# Patient Record
Sex: FEMALE | Race: WHITE | Employment: FULL TIME | URBAN - NONMETROPOLITAN AREA
[De-identification: names, ages, dates, MRNs, and addresses within clinical notes are randomized per-mention and may not be internally consistent; named-entity substitution may affect disease eponyms.]

---

## 2018-04-02 ENCOUNTER — HOSPITAL ENCOUNTER (EMERGENCY)
Age: 73
Discharge: HOME | End: 2018-04-02
Payer: MEDICARE

## 2018-04-02 VITALS
DIASTOLIC BLOOD PRESSURE: 68 MMHG | SYSTOLIC BLOOD PRESSURE: 123 MMHG | HEIGHT: 64 IN | HEART RATE: 97 BPM | WEIGHT: 117 LBS | TEMPERATURE: 98.4 F | OXYGEN SATURATION: 98 % | RESPIRATION RATE: 18 BRPM | BODY MASS INDEX: 19.97 KG/M2

## 2018-04-02 DIAGNOSIS — J01.00 ACUTE NON-RECURRENT MAXILLARY SINUSITIS: Primary | ICD-10-CM

## 2018-04-02 PROCEDURE — 99213 OFFICE O/P EST LOW 20 MIN: CPT | Performed by: NURSE PRACTITIONER

## 2018-04-02 PROCEDURE — 99213 OFFICE O/P EST LOW 20 MIN: CPT

## 2018-04-02 RX ORDER — DOXYCYCLINE HYCLATE 100 MG
100 TABLET ORAL 2 TIMES DAILY
Qty: 20 TABLET | Refills: 0 | Status: SHIPPED | OUTPATIENT
Start: 2018-04-02 | End: 2018-04-12

## 2018-04-02 ASSESSMENT — ENCOUNTER SYMPTOMS
SINUS PAIN: 1
SHORTNESS OF BREATH: 0
SINUS PRESSURE: 1
WHEEZING: 0
COUGH: 1
CONSTIPATION: 0
NAUSEA: 0
SORE THROAT: 0
DIARRHEA: 0
ABDOMINAL PAIN: 0

## 2018-04-04 ENCOUNTER — HOSPITAL ENCOUNTER (OUTPATIENT)
Dept: WOMENS IMAGING | Age: 73
Discharge: HOME | End: 2018-04-04
Payer: MEDICARE

## 2018-04-04 DIAGNOSIS — Z12.31 VISIT FOR SCREENING MAMMOGRAM: ICD-10-CM

## 2018-04-04 PROCEDURE — 3209999900 MAM COMPARISON OF OUTSIDE IMAGES

## 2018-04-04 PROCEDURE — 77063 BREAST TOMOSYNTHESIS BI: CPT

## 2018-10-12 ENCOUNTER — OFFICE VISIT (OUTPATIENT)
Dept: FAMILY MEDICINE CLINIC | Age: 73
End: 2018-10-12
Payer: MEDICARE

## 2018-10-12 VITALS
HEART RATE: 63 BPM | TEMPERATURE: 97.8 F | HEIGHT: 64 IN | RESPIRATION RATE: 12 BRPM | DIASTOLIC BLOOD PRESSURE: 64 MMHG | SYSTOLIC BLOOD PRESSURE: 138 MMHG | OXYGEN SATURATION: 98 % | BODY MASS INDEX: 20.28 KG/M2 | WEIGHT: 118.8 LBS

## 2018-10-12 DIAGNOSIS — Z85.3 HX: BREAST CANCER: ICD-10-CM

## 2018-10-12 DIAGNOSIS — Z11.59 ENCOUNTER FOR HEPATITIS C SCREENING TEST FOR LOW RISK PATIENT: ICD-10-CM

## 2018-10-12 DIAGNOSIS — Z23 NEED FOR PROPHYLACTIC VACCINATION AND INOCULATION AGAINST VARICELLA: ICD-10-CM

## 2018-10-12 DIAGNOSIS — Z78.0 POST-MENOPAUSAL: Primary | ICD-10-CM

## 2018-10-12 DIAGNOSIS — Z23 NEED FOR PROPHYLACTIC VACCINATION AGAINST DIPHTHERIA-TETANUS-PERTUSSIS (DTP): ICD-10-CM

## 2018-10-12 DIAGNOSIS — E78.2 MIXED HYPERLIPIDEMIA: ICD-10-CM

## 2018-10-12 DIAGNOSIS — Z13.29 THYROID DISORDER SCREEN: ICD-10-CM

## 2018-10-12 DIAGNOSIS — E55.9 VITAMIN D DEFICIENCY: ICD-10-CM

## 2018-10-12 PROCEDURE — 1036F TOBACCO NON-USER: CPT | Performed by: NURSE PRACTITIONER

## 2018-10-12 PROCEDURE — 3017F COLORECTAL CA SCREEN DOC REV: CPT | Performed by: NURSE PRACTITIONER

## 2018-10-12 PROCEDURE — 1123F ACP DISCUSS/DSCN MKR DOCD: CPT | Performed by: NURSE PRACTITIONER

## 2018-10-12 PROCEDURE — G8427 DOCREV CUR MEDS BY ELIG CLIN: HCPCS | Performed by: NURSE PRACTITIONER

## 2018-10-12 PROCEDURE — 1101F PT FALLS ASSESS-DOCD LE1/YR: CPT | Performed by: NURSE PRACTITIONER

## 2018-10-12 PROCEDURE — G8400 PT W/DXA NO RESULTS DOC: HCPCS | Performed by: NURSE PRACTITIONER

## 2018-10-12 PROCEDURE — G8420 CALC BMI NORM PARAMETERS: HCPCS | Performed by: NURSE PRACTITIONER

## 2018-10-12 PROCEDURE — G8482 FLU IMMUNIZE ORDER/ADMIN: HCPCS | Performed by: NURSE PRACTITIONER

## 2018-10-12 PROCEDURE — 99204 OFFICE O/P NEW MOD 45 MIN: CPT | Performed by: NURSE PRACTITIONER

## 2018-10-12 PROCEDURE — 4040F PNEUMOC VAC/ADMIN/RCVD: CPT | Performed by: NURSE PRACTITIONER

## 2018-10-12 PROCEDURE — 1090F PRES/ABSN URINE INCON ASSESS: CPT | Performed by: NURSE PRACTITIONER

## 2018-10-12 RX ORDER — VITAMIN B COMPLEX
1 CAPSULE ORAL DAILY
COMMUNITY
End: 2019-08-26

## 2018-10-12 RX ORDER — LANOLIN ALCOHOL/MO/W.PET/CERES
5 CREAM (GRAM) TOPICAL NIGHTLY PRN
COMMUNITY

## 2018-10-12 RX ORDER — DIMENHYDRINATE 50 MG
1000 TABLET ORAL 2 TIMES DAILY
COMMUNITY
End: 2019-08-26 | Stop reason: ALTCHOICE

## 2018-10-12 RX ORDER — ANASTROZOLE 1 MG/1
1 TABLET ORAL DAILY
COMMUNITY
End: 2019-05-07 | Stop reason: ALTCHOICE

## 2018-10-12 RX ORDER — OLIVE LEAF EXTRACT 250 MG
1 CAPSULE ORAL 2 TIMES DAILY
COMMUNITY

## 2018-10-12 RX ORDER — LANOLIN ALCOHOL/MO/W.PET/CERES
50 CREAM (GRAM) TOPICAL DAILY
COMMUNITY

## 2018-10-12 RX ORDER — ASCORBIC ACID 1000 MG
1 TABLET ORAL DAILY
COMMUNITY

## 2018-10-12 RX ORDER — VITAMIN E 268 MG
400 CAPSULE ORAL DAILY
COMMUNITY

## 2018-10-12 RX ORDER — AMOXICILLIN 500 MG
3 CAPSULE ORAL
COMMUNITY

## 2018-10-12 RX ORDER — LANOLIN ALCOHOL/MO/W.PET/CERES
1 CREAM (GRAM) TOPICAL 2 TIMES DAILY
COMMUNITY

## 2018-10-12 RX ORDER — ACETAMINOPHEN 160 MG
1 TABLET,DISINTEGRATING ORAL DAILY
COMMUNITY
End: 2019-05-21 | Stop reason: DRUGHIGH

## 2018-10-12 RX ORDER — FOLIC ACID 0.8 MG
1 TABLET ORAL 3 TIMES DAILY
COMMUNITY
End: 2019-11-26 | Stop reason: ALTCHOICE

## 2018-10-12 RX ORDER — AMPICILLIN TRIHYDRATE 250 MG
500 CAPSULE ORAL 3 TIMES DAILY
COMMUNITY

## 2018-10-12 RX ORDER — BIOTIN 10 MG
2 TABLET ORAL DAILY
COMMUNITY

## 2018-10-12 RX ORDER — BIOTIN 2500 MCG
1 CAPSULE ORAL EVERY OTHER DAY
COMMUNITY

## 2018-10-12 ASSESSMENT — ENCOUNTER SYMPTOMS
ANAL BLEEDING: 0
DIARRHEA: 0
ABDOMINAL DISTENTION: 0
SHORTNESS OF BREATH: 0
ABDOMINAL PAIN: 0
SORE THROAT: 0
EYE REDNESS: 0
CONSTIPATION: 0
EYE DISCHARGE: 0
RHINORRHEA: 0
COLOR CHANGE: 0
BLOOD IN STOOL: 0
NAUSEA: 0
COUGH: 0

## 2018-10-12 ASSESSMENT — PATIENT HEALTH QUESTIONNAIRE - PHQ9
1. LITTLE INTEREST OR PLEASURE IN DOING THINGS: 0
2. FEELING DOWN, DEPRESSED OR HOPELESS: 0
SUM OF ALL RESPONSES TO PHQ9 QUESTIONS 1 & 2: 0
SUM OF ALL RESPONSES TO PHQ QUESTIONS 1-9: 0
SUM OF ALL RESPONSES TO PHQ QUESTIONS 1-9: 0

## 2018-10-12 NOTE — PROGRESS NOTES
signs are normal. She appears well-developed and well-nourished. She does not appear ill. No distress. HENT:   Head: Normocephalic and atraumatic. Right Ear: Hearing, tympanic membrane, external ear and ear canal normal. No drainage, swelling or tenderness. No decreased hearing is noted. Left Ear: Hearing, tympanic membrane, external ear and ear canal normal. No drainage, swelling or tenderness. No decreased hearing is noted. Nose: Nose normal. No mucosal edema. Mouth/Throat: Uvula is midline, oropharynx is clear and moist and mucous membranes are normal.   Eyes: Pupils are equal, round, and reactive to light. Conjunctivae and EOM are normal. Right eye exhibits no discharge. Left eye exhibits no discharge. No scleral icterus. Neck: Normal range of motion. No thyromegaly present. Cardiovascular: Normal rate, regular rhythm and normal heart sounds. No murmur heard. Pulmonary/Chest: Effort normal and breath sounds normal. No accessory muscle usage. No respiratory distress. She has no decreased breath sounds. She has no wheezes. She has no rhonchi. She has no rales. Abdominal: Soft. Bowel sounds are normal. She exhibits no distension. There is no hepatosplenomegaly. There is no tenderness. There is no CVA tenderness. Musculoskeletal: She exhibits no edema. Lymphadenopathy:        Head (right side): No submental, no submandibular, no tonsillar, no preauricular and no posterior auricular adenopathy present. Head (left side): No submental, no submandibular, no tonsillar, no preauricular and no posterior auricular adenopathy present. She has no cervical adenopathy. Right: No supraclavicular adenopathy present. Left: No supraclavicular adenopathy present. Neurological: She is alert and oriented to person, place, and time. No cranial nerve deficit. Skin: Skin is warm and dry. No rash noted. She is not diaphoretic. No erythema.    Psychiatric: She has a normal mood and affect. Her behavior is normal. Judgment and thought content normal. Cognition and memory are normal.   Nursing note and vitals reviewed. Lab Results   Component Value Date    WBC 4.1 (L) 02/02/2015    HGB 12.6 02/02/2015    HCT 38.9 02/02/2015     02/02/2015    AST 21 04/08/2016     04/08/2016    K 4.4 04/08/2016    CL 99 04/08/2016    CREATININE 0.7 04/08/2016    BUN 24 (H) 04/08/2016    CO2 24 04/08/2016    TSH 2.450 02/02/2015    LABGLOM 83 (A) 04/08/2016    CALCIUM 10.0 04/08/2016       Assessment:       Diagnosis Orders   1. Post-menopausal  CBC Auto Differential    Hepatic Function Panel    Lipid Panel    Magnesium    T4    TSH with Reflex    Vitamin D 25 Hydroxy    Hepatitis C Antibody    Comprehensive Metabolic Panel   2. Mixed hyperlipidemia  CBC Auto Differential    Hepatic Function Panel    Lipid Panel    Magnesium    T4    TSH with Reflex    Vitamin D 25 Hydroxy    Hepatitis C Antibody    Comprehensive Metabolic Panel   3. HX: breast cancer  CBC Auto Differential    Hepatic Function Panel    Lipid Panel    Magnesium    T4    TSH with Reflex    Vitamin D 25 Hydroxy    Hepatitis C Antibody    Comprehensive Metabolic Panel   4. Need for prophylactic vaccination against diphtheria-tetanus-pertussis (DTP)  Tdap (ADACEL) 5-2-15.5 LF-MCG/0.5 injection   5. Need for prophylactic vaccination and inoculation against varicella  zoster recombinant adjuvanted vaccine (SHINGRIX) 50 MCG SUSR injection   6. Encounter for hepatitis C screening test for low risk patient  Hepatitis C Antibody   7. Vitamin D deficiency  CBC Auto Differential    Hepatic Function Panel    Lipid Panel    Magnesium    T4    TSH with Reflex    Vitamin D 25 Hydroxy    Hepatitis C Antibody    Comprehensive Metabolic Panel   8.  Thyroid disorder screen  CBC Auto Differential    Hepatic Function Panel    Lipid Panel    Magnesium    T4    TSH with Reflex    Vitamin D 25 Hydroxy    Hepatitis C Antibody    Comprehensive Metabolic

## 2018-11-01 LAB
ABSOLUTE BASO #: 0 K/UL (ref 0–0.1)
ABSOLUTE EOS #: 0.1 K/UL (ref 0.1–0.4)
ABSOLUTE LYMPH #: 1.5 K/UL (ref 0.8–5.2)
ABSOLUTE MONO #: 0.4 K/UL (ref 0.1–0.9)
ABSOLUTE NEUT #: 2.5 K/UL (ref 1.3–9.1)
ALBUMIN SERPL-MCNC: 4.5 G/DL (ref 3.5–5.2)
ALK PHOSPHATASE: 82 U/L (ref 30–146)
ALT SERPL-CCNC: 16 U/L (ref 5–40)
ANION GAP SERPL CALCULATED.3IONS-SCNC: 15 MEQ/L (ref 10–19)
AST SERPL-CCNC: 22 U/L (ref 9–40)
BASOPHILS RELATIVE PERCENT: 0.9 %
BILIRUB SERPL-MCNC: 0.7 MG/DL
BILIRUBIN DIRECT: 0.2 MG/DL
BUN BLDV-MCNC: 17 MG/DL (ref 8–23)
CALCIUM SERPL-MCNC: 9.1 MG/DL (ref 8.5–10.5)
CHLORIDE BLD-SCNC: 98 MEQ/L (ref 95–107)
CHOLESTEROL/HDL RATIO: 3.2
CHOLESTEROL: 311 MG/DL
CO2: 25 MEQ/L (ref 19–31)
CREAT SERPL-MCNC: 0.9 MG/DL (ref 0.6–1.3)
EGFR AFRICAN AMERICAN: 74 ML/MIN/1.73 M2
EGFR IF NONAFRICAN AMERICAN: 63.9 ML/MIN/1.73 M2
EOSINOPHILS RELATIVE PERCENT: 1.4 %
GLUCOSE: 95 MG/DL (ref 70–99)
HCT VFR BLD CALC: 41.1 % (ref 36–48)
HDLC SERPL-MCNC: 96.5 MG/DL
HEMOGLOBIN: 13.6 G/DL (ref 12–16)
HEPATITIS C ANTIBODY: NEGATIVE
LDL CHOLESTEROL CALCULATED: 200 MG/DL
LDL/HDL RATIO: 2.1
LYMPHOCYTE %: 32.7 %
MAGNESIUM: 2.2 MG/DL (ref 1.6–2.6)
MCH RBC QN AUTO: 29.5 PG (ref 27–34)
MCHC RBC AUTO-ENTMCNC: 33.1 G/DL (ref 31–36)
MCV RBC AUTO: 89.2 FL (ref 80–100)
MONOCYTES # BLD: 8.6 %
NEUTROPHILS RELATIVE PERCENT: 56.2 %
PDW BLD-RTO: 13.1 % (ref 10.8–14.8)
PLATELETS: 292 K/UL (ref 150–450)
POTASSIUM SERPL-SCNC: 4.5 MEQ/L (ref 3.5–5.4)
RBC: 4.61 M/UL (ref 4–5.5)
SODIUM BLD-SCNC: 138 MEQ/L (ref 135–146)
T4 TOTAL: 6.4 UG/DL (ref 4.5–12)
TOTAL PROTEIN: 6.8 G/DL (ref 6.1–8.3)
TRIGL SERPL-MCNC: 73 MG/DL
TSH SERPL DL<=0.05 MIU/L-ACNC: 1.99 UIU/ML (ref 0.4–4.1)
VLDLC SERPL CALC-MCNC: 15 MG/DL
WBC: 4.4 K/UL (ref 3.7–10.8)

## 2018-11-02 ENCOUNTER — TELEPHONE (OUTPATIENT)
Dept: FAMILY MEDICINE CLINIC | Age: 73
End: 2018-11-02

## 2018-11-02 LAB — VITAMIN D 25-HYDROXY: 45 NG/ML

## 2018-11-05 ENCOUNTER — OFFICE VISIT (OUTPATIENT)
Dept: FAMILY MEDICINE CLINIC | Age: 73
End: 2018-11-05
Payer: MEDICARE

## 2018-11-05 VITALS
DIASTOLIC BLOOD PRESSURE: 60 MMHG | OXYGEN SATURATION: 99 % | WEIGHT: 116.6 LBS | BODY MASS INDEX: 19.91 KG/M2 | HEIGHT: 64 IN | SYSTOLIC BLOOD PRESSURE: 126 MMHG | TEMPERATURE: 98.3 F | RESPIRATION RATE: 12 BRPM | HEART RATE: 78 BPM

## 2018-11-05 DIAGNOSIS — Z78.0 POST-MENOPAUSAL: ICD-10-CM

## 2018-11-05 DIAGNOSIS — Z23 NEED FOR PROPHYLACTIC VACCINATION AGAINST STREPTOCOCCUS PNEUMONIAE (PNEUMOCOCCUS): ICD-10-CM

## 2018-11-05 PROCEDURE — 4040F PNEUMOC VAC/ADMIN/RCVD: CPT | Performed by: FAMILY MEDICINE

## 2018-11-05 PROCEDURE — G8400 PT W/DXA NO RESULTS DOC: HCPCS | Performed by: FAMILY MEDICINE

## 2018-11-05 PROCEDURE — 1101F PT FALLS ASSESS-DOCD LE1/YR: CPT | Performed by: FAMILY MEDICINE

## 2018-11-05 PROCEDURE — 3017F COLORECTAL CA SCREEN DOC REV: CPT | Performed by: FAMILY MEDICINE

## 2018-11-05 PROCEDURE — 99214 OFFICE O/P EST MOD 30 MIN: CPT | Performed by: FAMILY MEDICINE

## 2018-11-05 PROCEDURE — 1090F PRES/ABSN URINE INCON ASSESS: CPT | Performed by: FAMILY MEDICINE

## 2018-11-05 PROCEDURE — 1123F ACP DISCUSS/DSCN MKR DOCD: CPT | Performed by: FAMILY MEDICINE

## 2018-11-05 PROCEDURE — G8427 DOCREV CUR MEDS BY ELIG CLIN: HCPCS | Performed by: FAMILY MEDICINE

## 2018-11-05 PROCEDURE — G8482 FLU IMMUNIZE ORDER/ADMIN: HCPCS | Performed by: FAMILY MEDICINE

## 2018-11-05 PROCEDURE — G8420 CALC BMI NORM PARAMETERS: HCPCS | Performed by: FAMILY MEDICINE

## 2018-11-05 PROCEDURE — G0009 ADMIN PNEUMOCOCCAL VACCINE: HCPCS | Performed by: FAMILY MEDICINE

## 2018-11-05 PROCEDURE — 1036F TOBACCO NON-USER: CPT | Performed by: FAMILY MEDICINE

## 2018-11-05 PROCEDURE — 90670 PCV13 VACCINE IM: CPT | Performed by: FAMILY MEDICINE

## 2018-11-05 ASSESSMENT — ENCOUNTER SYMPTOMS
SHORTNESS OF BREATH: 0
ABDOMINAL PAIN: 0
NAUSEA: 0
VOMITING: 0
BLOOD IN STOOL: 0
COUGH: 0
EYE PAIN: 0
TROUBLE SWALLOWING: 0
CONSTIPATION: 0
DIARRHEA: 0

## 2018-11-05 NOTE — PATIENT INSTRUCTIONS
example, DTaP), should not get PCV13. Anyone with a severe allergy to any component of PCV13 should not get the vaccine. Tell your doctor if the person being vaccinated has any severe allergies. If the person scheduled for vaccination is not feeling well, your healthcare provider might decide to reschedule the shot on another day. Risks of a vaccine reaction  With any medicine, including vaccines, there is a chance of reactions. These are usually mild and go away on their own, but serious reactions are also possible. Problems reported following PCV13 varied by age and dose in the series. The most common problems reported among children were:  · About half became drowsy after the shot, had a temporary loss of appetite, or had redness or tenderness where the shot was given. · About 1 out of 3 had swelling where the shot was given. · About 1 out of 3 had a mild fever, and about 1 in 20 had a fever over 102.2°F.  · Up to about 8 out of 10 became fussy or irritable. Adults have reported pain, redness, and swelling where the shot was given; also mild fever, fatigue, headache, chills, or muscle pain. Kiki Martinez children who get PCV13 along with inactivated flu vaccine at the same time may be at increased risk for seizures caused by fever. Ask your doctor for more information. Problems that could happen after any vaccine:  · People sometimes faint after a medical procedure, including vaccination. Sitting or lying down for about 15 minutes can help prevent fainting and the injuries caused by a fall. Tell your doctor if you feel dizzy or have vision changes or ringing in the ears. · Some older children and adults get severe pain in the shoulder and have difficulty moving the arm where a shot was given. This happens very rarely. · Any medication can cause a severe allergic reaction.  Such reactions from a vaccine are very rare, estimated at about 1 in a million doses, and would happen within a few minutes to a few hours

## 2018-11-05 NOTE — PROGRESS NOTES
Take 1 capsule by mouth daily      Garlic 791 MG CAPS Take 1 capsule by mouth 2 times daily      Calcium Carbonate-Vit D-Min (CALCIUM 1200 PO) Take 0.5 tablets by mouth 2 times daily      Omega-3 Fatty Acids (FISH OIL) 1200 MG CAPS Take 3,200 mg by mouth 2 times daily       vitamin E 400 UNIT capsule Take 400 Units by mouth daily      b complex vitamins capsule Take 1 capsule by mouth daily      Cyanocobalamin (VITAMIN B-12) 3000 MCG SUBL Place 2 tablets under the tongue daily       Flaxseed, Linseed, (FLAX SEED OIL) 1000 MG CAPS Take 1,000 mg by mouth 2 times daily      Biotin 2500 MCG CAPS Take 1 tablet by mouth daily      NONFORMULARY Take 1 capsule by mouth daily as needed Orega Resp      NONFORMULARY Take 1 capsule by mouth 2 times daily Respiration Blend SP-3      Olive Leaf Extract 250 MG CAPS Take 1 capsule by mouth 2 times daily      anastrozole (ARIMIDEX) 1 MG tablet Take 1 mg by mouth daily      UNKNOWN TO PATIENT       Cetirizine HCl (ZYRTEC ALLERGY) 10 MG CAPS Take  by mouth.  Loratadine (CLARITIN) 10 MG CAPS Take  by mouth.  zoster recombinant adjuvanted vaccine (SHINGRIX) 50 MCG SUSR injection 50 MCG IM then repeat 2-6 months. 0.5 mL 1    vitamin B-6 (PYRIDOXINE) 50 MG tablet Take 50 mg by mouth 2 times daily       No current facility-administered medications for this visit. Allergies   Allergen Reactions    Gluten Meal Other (See Comments)     Gluten,fruit, and sugar causes blisters of mouth    Orange Oil Other (See Comments)     Any acidic fruit    Pcn [Penicillins] Other (See Comments)     Unknown  Childhood reaction.        Health Maintenance   Topic Date Due    DTaP/Tdap/Td vaccine (1 - Tdap) 02/05/1965    Shingles Vaccine (1 of 2 - 2 Dose Series) 02/05/1996    Colon cancer screen colonoscopy  02/05/1996    DEXA (modify frequency per FRAX score)  02/05/2011    Pneumococcal low/med risk (1 of 2 - PCV13) 02/05/2011    Breast cancer screen  04/04/2019    Lipid

## 2018-11-07 ENCOUNTER — TELEPHONE (OUTPATIENT)
Dept: FAMILY MEDICINE CLINIC | Age: 73
End: 2018-11-07

## 2018-11-08 DIAGNOSIS — Z78.0 POST-MENOPAUSAL: ICD-10-CM

## 2019-02-26 ENCOUNTER — TELEPHONE (OUTPATIENT)
Dept: FAMILY MEDICINE CLINIC | Age: 74
End: 2019-02-26

## 2019-05-02 NOTE — PROGRESS NOTES
Health Maintenance Due   Topic Date Due    DTaP/Tdap/Td vaccine (1 - Tdap) pended    Shingles Vaccine (1 of 2) pended    Colon cancer screen colonoscopy  Has order    Breast cancer screen  pended

## 2019-05-07 ENCOUNTER — OFFICE VISIT (OUTPATIENT)
Dept: FAMILY MEDICINE CLINIC | Age: 74
End: 2019-05-07
Payer: MEDICARE

## 2019-05-07 VITALS
HEART RATE: 81 BPM | BODY MASS INDEX: 19.99 KG/M2 | SYSTOLIC BLOOD PRESSURE: 112 MMHG | DIASTOLIC BLOOD PRESSURE: 65 MMHG | WEIGHT: 120 LBS | OXYGEN SATURATION: 96 % | TEMPERATURE: 97.5 F | RESPIRATION RATE: 12 BRPM | HEIGHT: 65 IN

## 2019-05-07 DIAGNOSIS — E78.2 MIXED HYPERLIPIDEMIA: ICD-10-CM

## 2019-05-07 DIAGNOSIS — Z23 NEED FOR PROPHYLACTIC VACCINATION AGAINST DIPHTHERIA-TETANUS-PERTUSSIS (DTP): ICD-10-CM

## 2019-05-07 DIAGNOSIS — Z12.31 ENCOUNTER FOR SCREENING MAMMOGRAM FOR BREAST CANCER: ICD-10-CM

## 2019-05-07 DIAGNOSIS — Z23 NEED FOR PROPHYLACTIC VACCINATION AND INOCULATION AGAINST VARICELLA: ICD-10-CM

## 2019-05-07 DIAGNOSIS — R31.9 HEMATURIA, UNSPECIFIED TYPE: Primary | ICD-10-CM

## 2019-05-07 DIAGNOSIS — E55.9 VITAMIN D DEFICIENCY: ICD-10-CM

## 2019-05-07 LAB
BILIRUBIN URINE: NEGATIVE
BLOOD URINE, POC: ABNORMAL
CHARACTER, URINE: ABNORMAL
COLOR, URINE: YELLOW
GLUCOSE URINE: NEGATIVE MG/DL
KETONES, URINE: NEGATIVE
LEUKOCYTE CLUMPS, URINE: ABNORMAL
NITRITE, URINE: NEGATIVE
PH, URINE: 5.5 (ref 5–9)
PROTEIN, URINE: NEGATIVE MG/DL
SPECIFIC GRAVITY, URINE: 1.02 (ref 1–1.03)
UROBILINOGEN, URINE: 0.2 EU/DL (ref 0–1)

## 2019-05-07 PROCEDURE — G8420 CALC BMI NORM PARAMETERS: HCPCS | Performed by: NURSE PRACTITIONER

## 2019-05-07 PROCEDURE — G8400 PT W/DXA NO RESULTS DOC: HCPCS | Performed by: NURSE PRACTITIONER

## 2019-05-07 PROCEDURE — 3017F COLORECTAL CA SCREEN DOC REV: CPT | Performed by: NURSE PRACTITIONER

## 2019-05-07 PROCEDURE — 1123F ACP DISCUSS/DSCN MKR DOCD: CPT | Performed by: NURSE PRACTITIONER

## 2019-05-07 PROCEDURE — 1036F TOBACCO NON-USER: CPT | Performed by: NURSE PRACTITIONER

## 2019-05-07 PROCEDURE — 99214 OFFICE O/P EST MOD 30 MIN: CPT | Performed by: NURSE PRACTITIONER

## 2019-05-07 PROCEDURE — G8427 DOCREV CUR MEDS BY ELIG CLIN: HCPCS | Performed by: NURSE PRACTITIONER

## 2019-05-07 PROCEDURE — 4040F PNEUMOC VAC/ADMIN/RCVD: CPT | Performed by: NURSE PRACTITIONER

## 2019-05-07 PROCEDURE — 1090F PRES/ABSN URINE INCON ASSESS: CPT | Performed by: NURSE PRACTITIONER

## 2019-05-07 ASSESSMENT — ENCOUNTER SYMPTOMS
SORE THROAT: 0
EYE DISCHARGE: 0
COLOR CHANGE: 0
EYE REDNESS: 0
ABDOMINAL PAIN: 0
ABDOMINAL DISTENTION: 0
NAUSEA: 0
DIARRHEA: 0
SHORTNESS OF BREATH: 0
ANAL BLEEDING: 0
BLOOD IN STOOL: 0
COUGH: 0
RHINORRHEA: 0
CONSTIPATION: 0

## 2019-05-07 ASSESSMENT — PATIENT HEALTH QUESTIONNAIRE - PHQ9
2. FEELING DOWN, DEPRESSED OR HOPELESS: 0
SUM OF ALL RESPONSES TO PHQ QUESTIONS 1-9: 0
SUM OF ALL RESPONSES TO PHQ QUESTIONS 1-9: 0
1. LITTLE INTEREST OR PLEASURE IN DOING THINGS: 0
SUM OF ALL RESPONSES TO PHQ9 QUESTIONS 1 & 2: 0

## 2019-05-07 NOTE — PATIENT INSTRUCTIONS
Thank you   1. Thank you for trusting us with your healthcare needs. You may receive a survey regarding today's visit. It would help us out if you would take a few moments to provide your feedback. We value your input. 2. Please bring in ALL medications BOTTLES, including inhalers, herbal supplements, over the counter, prescribed & non-prescribed medicine. The office would like actual medication bottles and a list.   3. Please note our OFFICE POLICIES:   a. Prior to getting your labs drawn, please check with your insurance company for benefits and eligibility of lab services. Often, insurance companies cover certain tests for preventative visits only. It is patient's responsibility to see what is covered. b. We are unable to change a diagnosis after the test has been performed. c. Lab orders will not be re-printed. Please hold onto your original lab orders and take them to your lab to be completed. d. If you no show your scheduled appointment three times, you will be dismissed from this practice. e. Temo Wendy must be completed 24 hours prior to your schedule appointment. 4. If the list below has been completed, PLEASE FAX RECORDS TO OUR OFFICE @ [phone].  Once the records have been received we will update your records at our office:  Health Maintenance Due   Topic Date Due    DTaP/Tdap/Td vaccine (1 - Tdap) 02/05/1965    Shingles Vaccine (1 of 2) 02/05/1996    Colon cancer screen colonoscopy  02/05/1996    Breast cancer screen  04/04/2019       Will culture urine and if you need an Antibiotic we will send it in  Drink plenty of water - half of your bodyweight in ounces daily (100 pound person would drink 50 oz)  Avoid caffeine - tea, soda, chocolate  Do not hold your bladder  Wipe from front to back after unination  Urinate after intercourse  Will send the urine for culture    Will get some repeat blood work     Will have you come back in 2 weeks to give a urine sample to retest blood in the cage. This is called flank pain. ? You have a fever, chills, or body aches. ? It hurts to urinate. ? You have groin or belly pain.     · You have more blood in your urine.    Watch closely for changes in your health, and be sure to contact your doctor if:    · You have new urination problems.     · You do not get better as expected. Where can you learn more? Go to https://"[employer]peDestieb.Homeschooling Through the Ages. org and sign in to your [employer] account. Enter A564 in the [employer] box to learn more about \"Blood in the Urine: Care Instructions. \"     If you do not have an account, please click on the \"Sign Up Now\" link. Current as of: March 20, 2018  Content Version: 11.9  © 4572-2722 [employer]. Care instructions adapted under license by [facility] ([facility]). If you have questions about a medical condition or this instruction, always ask your healthcare professional. [place] [address] any warranty or liability for your use of this information. Patient Education        Painful Urination (Dysuria): Care Instructions  Your Care Instructions  Burning pain with urination (dysuria) is a common symptom of a urinary tract infection or other urinary problems. The bladder may become inflamed. This can cause pain when the bladder fills and empties. You may also feel pain if the tube that carries urine from the bladder to the outside of the body (urethra) gets irritated or infected. Sexually transmitted infections (STIs) also may cause pain when you urinate. Sometimes the pain can be caused by things other than an infection. The urethra can be irritated by soaps, perfumes, or foreign objects in the urethra. Kidney stones can cause pain when they pass through the urethra. The cause may be hard to find. You may need tests. Treatment for painful urination depends on the cause. Follow-up care is a key part of your treatment and safety.  Be sure to make and go to all appointments, and call your doctor if you are having problems. It's also a good idea to know your test results and keep a list of the medicines you take. How can you care for yourself at home? · Drink extra water for the next day or two. This will help make the urine less concentrated. (If you have kidney, heart, or liver disease and have to limit fluids, talk with your doctor before you increase the amount of fluids you drink.)  · Avoid drinks that are carbonated or have caffeine. They can irritate the bladder. · Urinate often. Try to empty your bladder each time. For women:  · Urinate right after you have sex. · After going to the bathroom, wipe from front to back. · Avoid douches, bubble baths, and feminine hygiene sprays. And avoid other feminine hygiene products that have deodorants. When should you call for help? Call your doctor now or seek immediate medical care if:    · You have new symptoms, such as fever, nausea, or vomiting.     · You have new or worse symptoms of a urinary problem. For example:  ? You have blood or pus in your urine. ? You have chills or body aches. ? It hurts worse to urinate. ? You have groin or belly pain. ? You have pain in your back just below your rib cage (the flank area).    Watch closely for changes in your health, and be sure to contact your doctor if you have any problems. Where can you learn more? Go to https://[employer]iAps.Roobiq. org and sign in to your [employer] account. Enter U130 in the Reputation.com box to learn more about \"Painful Urination (Dysuria): Care Instructions. \"     If you do not have an account, please click on the \"Sign Up Now\" link. Current as of: March 20, 2018  Content Version: 11.9  © 4848-8011 [employer]. Care instructions adapted under license by [facility] ([facility]).  If you have questions about a medical condition or this instruction, always ask your healthcare professional. Peyton Franklin disclaims any warranty or liability for your use of this information.

## 2019-05-07 NOTE — PROGRESS NOTES
[address] [facility]. [address] [place]  [address]  Dept: [phone]  Dept Fax: [phone]  Loc: [phone]    Visit Date: 5/7/2019    Jose Gimenez is a 69 y.o. female who presents today for:  Chief Complaint   Patient presents with    6 Month Follow-Up    Dysuria     HPI:     Noticed a change in her urine color - it was darker than usual - urine is normally clear. Denies pain or urgency, has not had fevers, n/v/d, abdominal pain, or any other concerns. She usually uses a special soap to cleanse her vaginal area but she was out so she used regular soap - noticed the change in urine color    Has a history of blood in her urine - was sent to a specialist and he told her she is \"prone\" to that - this was years ago. She thinks she had scopes in the past - when she was younger she would always get UTI - pus and blood in the urine - 30 years later is when she saw the specialist (unsure who it was). For her cholesterol she is taking Garlic, taking fish oil BID, eating oatmeal, and vitamin D.      Went to her cancer doctor at the end of the year - thinks she had mammogram the end of last year    Has papers for the colonoscopy - needs to call and schedule    HPI  Health Maintenance   Topic Date Due    DTaP/Tdap/Td vaccine (1 - Tdap) 02/05/1965    Shingles Vaccine (1 of 2) 02/05/1996    Colon cancer screen colonoscopy  02/05/1996    Breast cancer screen  04/04/2019    Pneumococcal 65+ years Vaccine (2 of 2 - PPSV23) 11/05/2019    Lipid screen  10/31/2023    DEXA (modify frequency per FRAX score)  Completed    Flu vaccine  Completed    Hepatitis C screen  Completed       Past Medical History:   Diagnosis Date    Cancer [facility])       Past Surgical History:   Procedure Laterality Date    BREAST SURGERY      FINGER TRIGGER RELEASE Right 2017     Family History   Problem Relation Age of Onset    Diabetes Mother     Cancer Mother colon    High Blood Pressure Mother     No Known Problems Father     Breast Cancer Sister     High Blood Pressure Brother     No Known Problems Brother     No Known Problems Brother      Social History     Tobacco Use    Smoking status: Never Smoker    Smokeless tobacco: Never Used   Substance Use Topics    Alcohol use: No      Current Outpatient Medications   Medication Sig Dispense Refill    Tdap (ADACEL) 5-2-15.5 LF-MCG/0.5 injection Inject 0.5 mLs into the muscle once for 1 dose 0.5 mL 0    zoster recombinant adjuvanted vaccine (SHINGRIX) 50 MCG/0.5ML SUSR injection Inject 0.5 mLs into the muscle once for 1 dose 50 MCG IM then repeat 2-6 months.  1 each 1    Cinnamon 500 MG CAPS Take 1,500 mg by mouth 2 times daily      Magnesium 500 MG CAPS Take 1 capsule by mouth daily      Ginger, Zingiber officinalis, (GINGER ROOT) 500 MG CAPS Take 1 capsule by mouth daily      Turmeric Curcumin 500 MG CAPS Take 1 capsule by mouth daily      melatonin 3 MG TABS tablet Take 5 mg by mouth nightly as needed      NONFORMULARY Take 1 tablet by mouth 2 times daily Cholestrol, artichoke, mga strength beta sitosterol      Cholecalciferol (VITAMIN D3) 2000 units CAPS Take 1 capsule by mouth daily      Garlic 087 MG CAPS Take 1 capsule by mouth 2 times daily      Calcium Carbonate-Vit D-Min (CALCIUM 1200 PO) Take 0.5 tablets by mouth 2 times daily      Omega-3 Fatty Acids (FISH OIL) 1200 MG CAPS Take 3,200 mg by mouth 2 times daily       vitamin E 400 UNIT capsule Take 400 Units by mouth daily      b complex vitamins capsule Take 1 capsule by mouth daily      vitamin B-6 (PYRIDOXINE) 50 MG tablet Take 50 mg by mouth 2 times daily      Cyanocobalamin (VITAMIN B-12) 3000 MCG SUBL Place 2 tablets under the tongue daily       Flaxseed, Linseed, (FLAX SEED OIL) 1000 MG CAPS Take 1,000 mg by mouth 2 times daily      Biotin 2500 MCG CAPS Take 1 tablet by mouth daily      NONFORMULARY Take 1 capsule by mouth well-nourished. She does not appear ill. No distress. HENT:   Head: Normocephalic and atraumatic. Right Ear: Hearing and external ear normal. No decreased hearing is noted. Left Ear: Hearing and external ear normal. No decreased hearing is noted. Nose: Nose normal. No nasal deformity. Eyes: Conjunctivae are normal. Right eye exhibits no discharge. Left eye exhibits no discharge. Neck: Normal range of motion. Neck supple. Pulmonary/Chest: Effort normal. No respiratory distress. Abdominal: She exhibits no distension. There is no guarding. Musculoskeletal: Normal range of motion. She exhibits no tenderness or deformity. Neurological: She is alert. Gait normal.   Skin: No rash (On exposed areas) noted. She is not diaphoretic. No erythema. No pallor. Psychiatric: She has a normal mood and affect. Her speech is normal and behavior is normal. Judgment and thought content normal. Cognition and memory are normal.       Lab Results   Component Value Date    WBC 4.4 10/31/2018    HGB 13.6 10/31/2018    HCT 41.1 10/31/2018     10/31/2018    CHOL 311 (H) 10/31/2018    TRIG 73 10/31/2018    HDL 96.5 10/31/2018    LDLCALC 200 (H) 10/31/2018    AST 22 10/31/2018     10/31/2018    K 4.5 10/31/2018    CL 98 10/31/2018    CREATININE 0.9 10/31/2018    BUN 17 10/31/2018    CO2 25 10/31/2018    TSH 1.99 10/31/2018    LABGLOM 83 (A) 04/08/2016    MG 2.2 10/31/2018    CALCIUM 9.1 10/31/2018    VITD25 45 10/31/2018       Assessment:       Diagnosis Orders   1. Hematuria, unspecified type  CBC Auto Differential    Hepatic Function Panel    Lipid Panel    Magnesium    T4    TSH with Reflex    Vitamin D 25 Hydroxy    Comprehensive Metabolic Panel    Urine Culture   2. Encounter for screening mammogram for breast cancer     3. Need for prophylactic vaccination against diphtheria-tetanus-pertussis (DTP)  Tdap (ADACEL) 5-2-15.5 LF-MCG/0.5 injection   4.  Need for prophylactic vaccination and inoculation against varicella  zoster recombinant adjuvanted vaccine [facility]) 50 MCG/0.5ML SUSR injection   5. Mixed hyperlipidemia  CBC Auto Differential    Hepatic Function Panel    Lipid Panel    Magnesium    T4    TSH with Reflex    Vitamin D 25 Hydroxy    Comprehensive Metabolic Panel   6. Vitamin D deficiency  Vitamin D 25 Hydroxy       Plan: Will culture urine and if you need an Antibiotic we will send it in  Drink plenty of water - half of your bodyweight in ounces daily (100 pound person would drink 50 oz)  Avoid caffeine - tea, soda, chocolate  Do not hold your bladder  Wipe from front to back after unination  Urinate after intercourse  Will send the urine for culture    Will get some repeat blood work     Will have you come back in 2 weeks to give a urine sample to retest blood in the urine - if still positive may refer to Urology. Return in about 6 months (around 11/7/2019), or if symptoms worsen or fail to improve, for 2 weeks for nurse visit for urine sample. Orders Placed:  Orders Placed This Encounter   Procedures    Urine Culture    CBC Auto Differential    Hepatic Function Panel    Lipid Panel    Magnesium    T4    TSH with Reflex    Vitamin D 25 Hydroxy    Comprehensive Metabolic Panel    POCT Urinalysis No Micro (Auto)     Medications Prescribed:  Orders Placed This Encounter   Medications    Tdap (ADACEL) 5-2-15.5 LF-MCG/0.5 injection     Sig: Inject 0.5 mLs into the muscle once for 1 dose     Dispense:  0.5 mL     Refill:  0    zoster recombinant adjuvanted vaccine (SHINGRIX) 50 MCG/0.5ML SUSR injection     Sig: Inject 0.5 mLs into the muscle once for 1 dose 50 MCG IM then repeat 2-6 months.      Dispense:  1 each     Refill:  1       Future Appointments   Date Time Provider Nuzhat Butcher   5/21/2019  8:00 AM SCHEDULE[facility] SRPX  [facility] [place]   11/5/2019  8:00 AM Louisa Padilla, APRN - CNP [facility] [place]        Patient given educational materials - see patient instructions. Discussed use, benefit, and side effects of prescribedmedications. All patient questions answered. Pt voiced understanding. Reviewed health maintenance. Instructed to continue current medications, diet and exercise. Patient agreed with treatment plan. Follow up as directed.     Electronically signed by MATTHEW Berger CNP on 5/7/2019 at 5:11 PM

## 2019-05-08 LAB — URINE CULTURE, ROUTINE: NORMAL

## 2019-05-09 ENCOUNTER — TELEPHONE (OUTPATIENT)
Dept: FAMILY MEDICINE CLINIC | Age: 74
End: 2019-05-09

## 2019-05-09 NOTE — TELEPHONE ENCOUNTER
----- Message from MATTHEW Saldana CNP sent at 5/8/2019  5:12 PM EDT -----  Urine shows mixed growth

## 2019-05-16 LAB
ABSOLUTE BASO #: 0.1 K/UL (ref 0–0.1)
ABSOLUTE EOS #: 0.1 K/UL (ref 0.1–0.4)
ABSOLUTE LYMPH #: 1.4 K/UL (ref 0.8–5.2)
ABSOLUTE MONO #: 0.5 K/UL (ref 0.1–0.9)
ABSOLUTE NEUT #: 2.8 K/UL (ref 1.3–9.1)
ALBUMIN SERPL-MCNC: 4.2 G/DL (ref 3.2–5.3)
ALK PHOSPHATASE: 71 U/L (ref 39–130)
ALT SERPL-CCNC: 15 U/L (ref 0–31)
ANION GAP SERPL CALCULATED.3IONS-SCNC: 12 MMOL/L (ref 4–12)
AST SERPL-CCNC: 21 U/L (ref 0–41)
BASOPHILS RELATIVE PERCENT: 1 %
BILIRUB SERPL-MCNC: 0.5 MG/DL (ref 0.3–1.2)
BILIRUBIN DIRECT: 0.1 MG/DL (ref 0–0.4)
BUN BLDV-MCNC: 18 MG/DL (ref 5–27)
CALCIUM SERPL-MCNC: 9.4 MG/DL (ref 8.5–10.5)
CHLORIDE BLD-SCNC: 101 MMOL/L (ref 98–109)
CHOLESTEROL/HDL RATIO: 3.2 (ref 1–5)
CHOLESTEROL: 279 MG/DL (ref 150–200)
CO2: 28 MMOL/L (ref 22–32)
CREAT SERPL-MCNC: 0.92 MG/DL (ref 0.4–1)
EGFR AFRICAN AMERICAN: >60 ML/MIN/1.73SQ.M
EGFR IF NONAFRICAN AMERICAN: 60 ML/MIN/1.73SQ.M
EOSINOPHILS RELATIVE PERCENT: 2.9 %
GLUCOSE: 76 MG/DL (ref 65–99)
HCT VFR BLD CALC: 40 % (ref 36–48)
HDLC SERPL-MCNC: 87 MG/DL
HEMOGLOBIN: 13.8 G/DL (ref 12–16)
LDL CHOLESTEROL CALCULATED: 176 MG/DL
LDL/HDL RATIO: 2
LYMPHOCYTE %: 29.2 %
MAGNESIUM: 2.2 MG/DL (ref 1.8–2.6)
MCH RBC QN AUTO: 31.2 PG (ref 27–34)
MCHC RBC AUTO-ENTMCNC: 34.5 G/DL (ref 31–36)
MCV RBC AUTO: 90.3 FL (ref 80–100)
MONOCYTES # BLD: 9.5 %
NEUTROPHILS RELATIVE PERCENT: 57.2 %
PDW BLD-RTO: 13.2 % (ref 10.8–14.8)
PLATELETS: 286 K/UL (ref 150–450)
POTASSIUM SERPL-SCNC: 4.3 MMOL/L (ref 3.5–5)
RBC: 4.43 M/UL (ref 4–5.5)
SODIUM BLD-SCNC: 141 MMOL/L (ref 134–146)
T4 TOTAL: 6.6 UG/DL (ref 6.1–12.2)
TOTAL PROTEIN: 6.6 G/DL (ref 6–8)
TRIGL SERPL-MCNC: 81 MG/DL (ref 27–150)
TSH SERPL DL<=0.05 MIU/L-ACNC: 1.32 UIU/ML (ref 0.49–4.67)
VLDLC SERPL CALC-MCNC: 16 MG/DL (ref 0–30)
WBC: 4.9 K/UL (ref 3.7–10.8)

## 2019-05-18 LAB — VITAMIN D 25-HYDROXY: 106 NG/ML

## 2019-05-20 ENCOUNTER — TELEPHONE (OUTPATIENT)
Dept: FAMILY MEDICINE CLINIC | Age: 74
End: 2019-05-20

## 2019-05-20 NOTE — TELEPHONE ENCOUNTER
CALLED Phone: [phone] (OTHER PHONE), SPOKE WITH RICKY, she is taking 5,000 UNITS, DAILY. YAEL STATED she can start taking vitamin d every other day. Patient states no future questions or concerns at this time. Patient states no future questions or concerns at this time.

## 2019-05-20 NOTE — TELEPHONE ENCOUNTER
----- Message from MATTHEW Kern CNP sent at 5/20/2019  2:14 PM EDT -----  How much vitamin D is she taking? Level is a bit high so we will need to change dose  Cholesterol is still high but better than 6 months ago! I know she does not want to start cholesterol medication so please have her continue to take her fish oil and be very strict with her diet.

## 2019-05-21 ENCOUNTER — NURSE ONLY (OUTPATIENT)
Dept: FAMILY MEDICINE CLINIC | Age: 74
End: 2019-05-21
Payer: MEDICARE

## 2019-05-21 VITALS
BODY MASS INDEX: 20.1 KG/M2 | DIASTOLIC BLOOD PRESSURE: 65 MMHG | WEIGHT: 119.2 LBS | OXYGEN SATURATION: 98 % | SYSTOLIC BLOOD PRESSURE: 119 MMHG | HEART RATE: 72 BPM | TEMPERATURE: 97.9 F

## 2019-05-21 DIAGNOSIS — E78.2 MIXED HYPERLIPIDEMIA: ICD-10-CM

## 2019-05-21 DIAGNOSIS — J06.9 VIRAL URI WITH COUGH: ICD-10-CM

## 2019-05-21 DIAGNOSIS — R31.9 HEMATURIA, UNSPECIFIED TYPE: ICD-10-CM

## 2019-05-21 DIAGNOSIS — Z12.31 ENCOUNTER FOR SCREENING MAMMOGRAM FOR BREAST CANCER: ICD-10-CM

## 2019-05-21 DIAGNOSIS — E55.9 VITAMIN D DEFICIENCY: ICD-10-CM

## 2019-05-21 DIAGNOSIS — R82.71 ASYMPTOMATIC BACTERIURIA: Primary | ICD-10-CM

## 2019-05-21 LAB
BILIRUBIN URINE: NEGATIVE
BLOOD URINE, POC: ABNORMAL
CHARACTER, URINE: CLEAR
COLOR, URINE: YELLOW
GLUCOSE URINE: NEGATIVE MG/DL
KETONES, URINE: NEGATIVE
LEUKOCYTE CLUMPS, URINE: ABNORMAL
NITRITE, URINE: NEGATIVE
PH, URINE: 7.5 (ref 5–9)
PROTEIN, URINE: NEGATIVE MG/DL
SPECIFIC GRAVITY, URINE: 1.01 (ref 1–1.03)
UROBILINOGEN, URINE: 0.2 EU/DL (ref 0–1)

## 2019-05-21 PROCEDURE — 99214 OFFICE O/P EST MOD 30 MIN: CPT | Performed by: NURSE PRACTITIONER

## 2019-05-21 ASSESSMENT — ENCOUNTER SYMPTOMS
RHINORRHEA: 1
CONSTIPATION: 0
VOMITING: 0
EYE DISCHARGE: 0
SORE THROAT: 0
EYE REDNESS: 0
SHORTNESS OF BREATH: 0
TROUBLE SWALLOWING: 0
BACK PAIN: 0
CHEST TIGHTNESS: 0
COUGH: 1
DIARRHEA: 0
WHEEZING: 0
ABDOMINAL PAIN: 0
SINUS PRESSURE: 0
NAUSEA: 0
SINUS PAIN: 0

## 2019-05-21 NOTE — PATIENT INSTRUCTIONS
Patient Education     Continue current medications. Continue current diet. Switch Vitamin D3 5,000 units to every other day. Increase water, 64 oz/day. Recheck labs in 3 months. Follow up as scheduled, sooner if needed. Cholesterol: Choosing a Heart-Healthy Life (01:57)  Your health professional recommends that you watch this short online health video. Learn about making healthy changes that can help lower your risk for heart attack and stroke. How to watch the video    Scan the QR code   OR Visit the website    [email or web address] [email or web address]   Current as of: July 22, 2018  Content Version: 12.0  © 1222-1098 [employer]orporated. Care instructions adapted under license by [facility] ([facility]). If you have questions about a medical condition or this instruction, always ask your healthcare professional. [address] any warranty or liability for your use of this information.

## 2019-05-21 NOTE — PROGRESS NOTES
[address] [facility] [address] [facility]  Dept: [phone]  Dept Fax: [phone]  Loc: [address] [facility]       Chief Complaint   Patient presents with    Discuss Labs    Urinary Tract Infection       Nurses Notes reviewed and I agree except as noted in the HPI. HISTORY OF PRESENT ILLNESS   Jose Gimenez is a 69 y.o. female who presents for f/u UTI and to discuss labs. Repeat UA - Small leukocytes/blood. No UTI s/s. Hyperlipidemia - Trying to focus on a low fat/gluten free diet. Avoiding sugar \"except for honey. \"  Eating 1/2 avacado, oatmeal, and sugar free dark chocolate piece every day. She is also taking Fish Oil as directed. Vitamin D deficiency - Taking 5,000 units daily. Recent level -, advised to take 5,000 units every other day. URI s/s onset 2 weeks ago with cough and congestion. No fever, wheezing, or SOB. Doing well with homeopathic medication. No addititional complaints. REVIEW OF SYSTEMS     Review of Systems   Constitutional: Negative for chills, diaphoresis, fatigue and fever. HENT: Positive for congestion, postnasal drip and rhinorrhea. Negative for ear pain, sinus pressure, sinus pain, sore throat and trouble swallowing. Eyes: Negative for discharge and redness. Respiratory: Positive for cough. Negative for chest tightness, shortness of breath and wheezing. Cardiovascular: Negative for chest pain. Gastrointestinal: Negative for abdominal pain, constipation, diarrhea, nausea and vomiting. Genitourinary: Negative for decreased urine volume, difficulty urinating, dysuria, flank pain, frequency, genital sores, hematuria, menstrual problem, pelvic pain, urgency, vaginal bleeding, vaginal discharge and vaginal pain. Musculoskeletal: Negative for back pain, neck pain and neck stiffness. Skin: Negative for rash.    Neurological: Negative for dizziness, light-headedness and headaches. Hematological: Negative for adenopathy. Psychiatric/Behavioral: Negative for sleep disturbance. PAST MEDICAL HISTORY         Diagnosis Date    Cancer [facility])        SURGICAL HISTORY     Patient  has a past surgical history that includes Breast surgery and Finger trigger release (Right, 2017).     CURRENT MEDICATIONS       Outpatient Medications Prior to Visit   Medication Sig Dispense Refill    vitamin D (CHOLECALCIFEROL) 5000 units CAPS capsule Take 5,000 Units by mouth every other day      Cinnamon 500 MG CAPS Take 1,500 mg by mouth 2 times daily      Magnesium 500 MG CAPS Take 1 capsule by mouth daily      Ginger, Zingiber officinalis, (GINGER ROOT) 500 MG CAPS Take 1 capsule by mouth daily      Turmeric Curcumin 500 MG CAPS Take 1 capsule by mouth daily      melatonin 3 MG TABS tablet Take 5 mg by mouth nightly as needed      NONFORMULARY Take 1 tablet by mouth 2 times daily Cholestrol, artichoke, mga strength beta sitosterol      Garlic 551 MG CAPS Take 1 capsule by mouth 2 times daily      Calcium Carbonate-Vit D-Min (CALCIUM 1200 PO) Take 0.5 tablets by mouth 2 times daily      Omega-3 Fatty Acids (FISH OIL) 1200 MG CAPS Take 3,200 mg by mouth 2 times daily       vitamin E 400 UNIT capsule Take 400 Units by mouth daily      b complex vitamins capsule Take 1 capsule by mouth daily      vitamin B-6 (PYRIDOXINE) 50 MG tablet Take 50 mg by mouth 2 times daily      Cyanocobalamin (VITAMIN B-12) 3000 MCG SUBL Place 2 tablets under the tongue daily       Flaxseed, Linseed, (FLAX SEED OIL) 1000 MG CAPS Take 1,000 mg by mouth 2 times daily      Biotin 2500 MCG CAPS Take 1 tablet by mouth daily      NONFORMULARY Take 1 capsule by mouth daily as needed [place] Resp      NONFORMULARY Take 1 capsule by mouth 2 times daily Respiration Blend SP-3      Olive Leaf Extract 250 MG CAPS Take 1 capsule by mouth 2 times daily      Cholecalciferol (VITAMIN D3) 2000 units CAPS Take 1 capsule by mouth daily      Cetirizine HCl (ZYRTEC ALLERGY) 10 MG CAPS Take  by mouth.  Loratadine (CLARITIN) 10 MG CAPS Take  by mouth. No facility-administered medications prior to visit. ALLERGIES     Patient is is allergic to gluten meal; orange oil; and pcn [penicillins]. FAMILY HISTORY     Patient's family history includes Breast Cancer in her sister; Cancer in her mother; Diabetes in her mother; High Blood Pressure in her brother and mother; No Known Problems in her brother, brother, and father. SOCIAL HISTORY     Patient  reports that she has never smoked. She has never used smokeless tobacco. She reports that she does not drink alcohol or use drugs. PHYSICAL EXAM     VITALS  BP: 119/65, Temp: 97.9 °F (36.6 °C), Pulse: 72,  , SpO2: 98 %  Physical Exam   Constitutional: She is oriented to person, place, and time. Vital signs are normal. She appears well-developed and well-nourished. She is cooperative. Non-toxic appearance. She does not have a sickly appearance. She does not appear ill. No distress. HENT:   Head: Normocephalic and atraumatic. Right Ear: Hearing, tympanic membrane, external ear and ear canal normal.   Left Ear: Hearing, tympanic membrane, external ear and ear canal normal.   Nose: Nose normal. No mucosal edema or rhinorrhea. Right sinus exhibits no maxillary sinus tenderness and no frontal sinus tenderness. Left sinus exhibits no maxillary sinus tenderness and no frontal sinus tenderness. Mouth/Throat: Uvula is midline, oropharynx is clear and moist and mucous membranes are normal. No trismus in the jaw. No uvula swelling. No oropharyngeal exudate, posterior oropharyngeal edema, posterior oropharyngeal erythema or tonsillar abscesses. Eyes: Pupils are equal, round, and reactive to light. Conjunctivae, EOM and lids are normal. Right conjunctiva is not injected. Right conjunctiva has no hemorrhage. Left conjunctiva is not injected.  Left conjunctiva has no hemorrhage. No scleral icterus. Neck: Trachea normal and normal range of motion. Neck supple. No thyromegaly present. Cardiovascular: Normal rate, regular rhythm and normal heart sounds. No extrasystoles are present. PMI is not displaced. Exam reveals no gallop and no friction rub. No murmur heard. Pulses:       Radial pulses are 2+ on the right side, and 2+ on the left side. Pulmonary/Chest: Effort normal and breath sounds normal. No respiratory distress. Abdominal: There is no CVA tenderness. Musculoskeletal:        Right lower leg: She exhibits no swelling and no edema. Left lower leg: She exhibits no swelling and no edema. Lymphadenopathy:        Head (right side): No submental, no submandibular, no tonsillar, no preauricular, no posterior auricular and no occipital adenopathy present. Head (left side): No submental, no submandibular, no tonsillar, no preauricular, no posterior auricular and no occipital adenopathy present. She has no cervical adenopathy. Right: No supraclavicular adenopathy present. Left: No supraclavicular adenopathy present. Neurological: She is alert and oriented to person, place, and time. She is not disoriented. No cranial nerve deficit or sensory deficit. Coordination and gait normal. GCS eye subscore is 4. GCS verbal subscore is 5. GCS motor subscore is 6. CN II-XII grossly intact   Skin: Skin is warm, dry and intact. Capillary refill takes less than 2 seconds. No rash noted. She is not diaphoretic. No pallor. Skin warm and dry to touch, no rashes noted on exposed surfaces. Psychiatric: She has a normal mood and affect. Her speech is normal and behavior is normal. Judgment and thought content normal. Cognition and memory are normal.   Nursing note and vitals reviewed.       DIAGNOSTIC RESULTS   Labs:  Results for orders placed or performed in visit on 05/21/19   POCT Urinalysis No Micro (Auto)   Result Value Ref Range    Glucose, Ur Negative NEGATIVE mg/dl    Bilirubin Urine Negative     Ketones, Urine Negative NEGATIVE    Specific Gravity, Urine 1.015 1.002 - 1.03    Blood, UA POC Small (A) NEGATIVE    pH, Urine 7.50 5.0 - 9.0    Protein, Urine Negative NEGATIVE mg/dl    Urobilinogen, Urine 0.20 0.0 - 1.0 eu/dl    Nitrite, Urine Negative NEGATIVE    Leukocyte Clumps, Urine Small (A) NEGATIVE    Color, Urine Yellow YELLOW-STR    Character, Urine Clear [id number]       IMAGING:  ADRIAN Digital Screen Bilateral [[id number]]    (Results Pending)       No images are attached to the encounter or orders placed in the encounter. CLINICAL COURSE COURSE:     Vitals:    05/21/19 0803   BP: 119/65   Site: Right Upper Arm   Position: Sitting   Cuff Size: Medium Adult   Pulse: 72   Temp: 97.9 °F (36.6 °C)   TempSrc: Oral   SpO2: 98%   Weight: 119 lb 3.2 oz (54.1 kg)         PROCEDURES:  None  FINAL IMPRESSION      1. Asymptomatic bacteriuria    2. Encounter for screening mammogram for breast cancer    3. Hematuria, unspecified type    4. Viral URI with cough    5. Vitamin D deficiency    6. Mixed hyperlipidemia         DISPOSITION/PLAN     Vitamin D Deficiency - Listed at 2,000 units on Profile. Patient was taking 5,000 units daily. She was advised by Josey Arreguin to take every other day. Recheck in 3 months. Mixed hyperlipidemia - Continue current diet and fish oil. Repeat labs ordered by Josey Arreguin. Viral URI w/ cough - No adventitious lung sounds. Continue current treatment. Hematuria/Asymptomatic bacteriuria - Notes PMH of similar symptoms. States she was told that she is \"just one of those people. \"  No UTI s/s. Repeat testing prn. PATIENT REFERRED TO:    Follow up with Josey Arreguin as scheduled, sooner if needed.     DISCHARGE MEDICATIONS:  New Prescriptions    No medications on file         Electronically signed by MATTHEW Correa CNP on 5/21/2019 at 9:02 AM

## 2019-05-21 NOTE — PROGRESS NOTES
Health Maintenance Due   Topic Date Due    DTaP/Tdap/Td vaccine (1 - Tdap) 02/05/1965  Expense     Shingles Vaccine (1 of 2) 02/05/1996  Expense     Colon cancer screen colonoscopy  02/05/1996  referred     Breast cancer screen  04/04/2019  Signed

## 2019-05-22 ENCOUNTER — TELEPHONE (OUTPATIENT)
Dept: FAMILY MEDICINE CLINIC | Age: 74
End: 2019-05-22

## 2019-05-22 LAB
ORGANISM: ABNORMAL
URINE CULTURE, ROUTINE: ABNORMAL

## 2019-05-22 NOTE — TELEPHONE ENCOUNTER
----- Message from MATTHEW Ackerman CNP sent at 5/22/2019 10:39 AM EDT -----  Urine culture with growth of contaminants. Follow up as needed for UTI symptoms.

## 2019-06-14 ENCOUNTER — TELEPHONE (OUTPATIENT)
Dept: FAMILY MEDICINE CLINIC | Age: 74
End: 2019-06-14

## 2019-06-18 ENCOUNTER — OFFICE VISIT (OUTPATIENT)
Dept: FAMILY MEDICINE CLINIC | Age: 74
End: 2019-06-18
Payer: MEDICARE

## 2019-06-18 VITALS
HEIGHT: 65 IN | TEMPERATURE: 98.3 F | WEIGHT: 120 LBS | BODY MASS INDEX: 19.99 KG/M2 | RESPIRATION RATE: 12 BRPM | DIASTOLIC BLOOD PRESSURE: 73 MMHG | OXYGEN SATURATION: 100 % | HEART RATE: 84 BPM | SYSTOLIC BLOOD PRESSURE: 131 MMHG

## 2019-06-18 DIAGNOSIS — L98.9 SKIN LESION: Primary | ICD-10-CM

## 2019-06-18 DIAGNOSIS — R53.83 OTHER FATIGUE: ICD-10-CM

## 2019-06-18 DIAGNOSIS — R23.2 HOT FLASHES: ICD-10-CM

## 2019-06-18 PROCEDURE — G8400 PT W/DXA NO RESULTS DOC: HCPCS | Performed by: NURSE PRACTITIONER

## 2019-06-18 PROCEDURE — 3017F COLORECTAL CA SCREEN DOC REV: CPT | Performed by: NURSE PRACTITIONER

## 2019-06-18 PROCEDURE — 1090F PRES/ABSN URINE INCON ASSESS: CPT | Performed by: NURSE PRACTITIONER

## 2019-06-18 PROCEDURE — 1123F ACP DISCUSS/DSCN MKR DOCD: CPT | Performed by: NURSE PRACTITIONER

## 2019-06-18 PROCEDURE — G8427 DOCREV CUR MEDS BY ELIG CLIN: HCPCS | Performed by: NURSE PRACTITIONER

## 2019-06-18 PROCEDURE — 99213 OFFICE O/P EST LOW 20 MIN: CPT | Performed by: NURSE PRACTITIONER

## 2019-06-18 PROCEDURE — 4040F PNEUMOC VAC/ADMIN/RCVD: CPT | Performed by: NURSE PRACTITIONER

## 2019-06-18 PROCEDURE — 1036F TOBACCO NON-USER: CPT | Performed by: NURSE PRACTITIONER

## 2019-06-18 PROCEDURE — G8420 CALC BMI NORM PARAMETERS: HCPCS | Performed by: NURSE PRACTITIONER

## 2019-06-18 ASSESSMENT — ENCOUNTER SYMPTOMS
CONSTIPATION: 0
SORE THROAT: 0
ABDOMINAL DISTENTION: 0
ABDOMINAL PAIN: 0
NAUSEA: 0
COLOR CHANGE: 0
SHORTNESS OF BREATH: 0
EYE DISCHARGE: 0
DIARRHEA: 0
ANAL BLEEDING: 0
RHINORRHEA: 0
BLOOD IN STOOL: 0
COUGH: 0
EYE REDNESS: 0

## 2019-06-18 NOTE — PROGRESS NOTES
[address] [facility]  [phone] (phone)  [phone] (fax)    Visit Date: 6/18/2019    Malgorzata Snowden is a 69 y.o. female who presents today for:  Chief Complaint   Patient presents with    Herpes Zoster     possible      HPI:     Had a red spot on her left upper arm x 2 weeks - c/o itching denies blistering or pain - has not spread - no known exposure or injury    Having hot flashes at night - was wondering about something for this that is not hormonal as she is being treated for hx breast cancer - Her Oncologist is Dr. Tegan Medina - has her on a \"cancer pill\" - Anastrozole 1 mg      HPI  Health Maintenance   Topic Date Due    DTaP/Tdap/Td vaccine (1 - Tdap) 02/05/1965    Shingles Vaccine (1 of 2) 02/05/1996    Colon cancer screen colonoscopy  02/05/1996    Annual Wellness Visit (AWV)  02/05/2009    Pneumococcal 65+ years Vaccine (2 of 2 - PPSV23) 11/05/2019    Breast cancer screen  06/03/2020    Lipid screen  05/16/2024    DEXA (modify frequency per FRAX score)  Completed    Flu vaccine  Completed    Hepatitis C screen  Completed       Past Medical History:   Diagnosis Date    Cancer [facility])       Past Surgical History:   Procedure Laterality Date    BREAST SURGERY      FINGER TRIGGER RELEASE Right 2017     Family History   Problem Relation Age of Onset    Diabetes Mother     Cancer Mother         colon    High Blood Pressure Mother     No Known Problems Father     Breast Cancer Sister     High Blood Pressure Brother     No Known Problems Brother     No Known Problems Brother      Social History     Tobacco Use    Smoking status: Never Smoker    Smokeless tobacco: Never Used   Substance Use Topics    Alcohol use: No      Current Outpatient Medications   Medication Sig Dispense Refill    triamcinolone (KENALOG) 0.1 % ointment Apply topically 2 times daily for 7 days 1 Tube 1    vitamin D (CHOLECALCIFEROL) 5000 units CAPS capsule Take 5,000 Units by mouth every other day congestion, ear pain, postnasal drip, rhinorrhea and sore throat. Eyes: Negative for discharge and redness. Respiratory: Negative for cough and shortness of breath. Cardiovascular: Negative for chest pain and leg swelling. Gastrointestinal: Negative for abdominal distention, abdominal pain, anal bleeding, blood in stool, constipation, diarrhea and nausea. Skin: Positive for rash (left upper arm). Negative for color change. Neurological: Negative for facial asymmetry, speech difficulty and weakness. Hematological: Does not bruise/bleed easily. Psychiatric/Behavioral: Negative for agitation and confusion. Objective:     Vitals:    06/18/19 1603   BP: 131/73   Site: Right Upper Arm   Position: Sitting   Cuff Size: Medium Adult   Pulse: 84   Resp: 12   Temp: 98.3 °F (36.8 °C)   TempSrc: Oral   SpO2: 100%   Weight: 120 lb (54.4 kg)   Height: 5' 4.57\" (1.64 m)       Body mass index is 20.24 kg/m². Wt Readings from Last 3 Encounters:   06/18/19 120 lb (54.4 kg)   05/21/19 119 lb 3.2 oz (54.1 kg)   05/07/19 120 lb (54.4 kg)     BP Readings from Last 3 Encounters:   06/18/19 131/73   05/21/19 119/65   05/07/19 112/65       Physical Exam   Constitutional: Vital signs are normal. She appears well-developed and well-nourished. She does not appear ill. No distress. HENT:   Head: Normocephalic and atraumatic. Right Ear: Hearing and external ear normal. No decreased hearing is noted. Left Ear: Hearing and external ear normal. No decreased hearing is noted. Nose: Nose normal. No nasal deformity. Eyes: Conjunctivae are normal. Right eye exhibits no discharge. Left eye exhibits no discharge. Neck: Normal range of motion. Neck supple. Pulmonary/Chest: Effort normal. No respiratory distress. Abdominal: She exhibits no distension. There is no guarding. Musculoskeletal: Normal range of motion. She exhibits no tenderness or deformity. Neurological: She is alert.  Gait normal.   Skin: Rash (left upper arm - red spot - see photo) noted. She is not diaphoretic. No erythema. No pallor. Psychiatric: She has a normal mood and affect. Her speech is normal and behavior is normal. Judgment and thought content normal. Cognition and memory are normal.         Lab Results   Component Value Date    WBC 4.9 05/16/2019    HGB 13.8 05/16/2019    HCT 40.0 05/16/2019     05/16/2019    CHOL 279 (H) 05/16/2019    TRIG 81 05/16/2019    HDL 87 05/16/2019    LDLCALC 176 (H) 05/16/2019    AST 21 05/16/2019     05/16/2019    K 4.3 05/16/2019     05/16/2019    CREATININE 0.92 05/16/2019    BUN 18 05/16/2019    CO2 28 05/16/2019    TSH 1.32 05/16/2019    LABGLOM 83 (A) 04/08/2016    MG 2.2 05/16/2019    CALCIUM 9.4 05/16/2019    VITD25 106 (H) 05/16/2019       Assessment:       Diagnosis Orders   1. Skin lesion  triamcinolone (KENALOG) 0.1 % ointment   2. Hot flashes  DHEA    DHEA-Sulfate   3. Other fatigue  Vitamin B12 & Folate       Plan: Will check the DHEA and B-12 levels  Will send in the ointment for the arm  Let us know if the area is not better or worse in about a week and we will biopsy    Return in about 3 months (around 9/18/2019), or if symptoms worsen or fail to improve. Orders Placed:  Orders Placed This Encounter   Procedures    DHEA    DHEA-Sulfate    Vitamin B12 & Folate     Medications Prescribed:  Orders Placed This Encounter   Medications    triamcinolone (KENALOG) 0.1 % ointment     Sig: Apply topically 2 times daily for 7 days     Dispense:  1 Tube     Refill:  1       Future Appointments   Date Time Provider Nuzhat Butcher   6/25/2019  2:20 PM [facility]   11/5/2019  8:00 AM MATTHEW Gomez - CNP [facility]O [facility] [address]        Patient given educational materials - see patient instructions. Discussed use, benefit, and side effects of prescribedmedications. All patient questions answered. Pt voiced understanding.  Reviewed health maintenance. Instructed to continue current medications, diet and exercise. Patient agreed with treatment plan. Follow up as directed.     Electronically signed by MATTHEW Lackey CNP on 6/18/2019 at 6:11 PM

## 2019-06-18 NOTE — PATIENT INSTRUCTIONS
Will check the DHEA and B-12 levels  Will send in the ointment for the arm  Let us know if the area is not better or worse in about a week and we will biopsy      Patient Education        Fatigue: Care Instructions  Your Care Instructions    Fatigue is a feeling of tiredness, exhaustion, or lack of energy. You may feel fatigue because of too much or not enough activity. It can also come from stress, lack of sleep, boredom, and poor diet. Many medical problems, such as viral infections, can cause fatigue. Emotional problems, especially depression, are often the cause of fatigue. Fatigue is most often a symptom of another problem. Treatment for fatigue depends on the cause. For example, if you have fatigue because you have a certain health problem, treating this problem also treats your fatigue. If depression or anxiety is the cause, treatment may help. Follow-up care is a key part of your treatment and safety. Be sure to make and go to all appointments, and call your doctor if you are having problems. It's also a good idea to know your test results and keep a list of the medicines you take. How can you care for yourself at home? · Get regular exercise. But don't overdo it. Go back and forth between rest and exercise. · Get plenty of rest.  · Eat a healthy diet. Do not skip meals, especially breakfast.  · Reduce your use of caffeine, tobacco, and alcohol. Caffeine is most often found in coffee, tea, cola drinks, and chocolate. · Limit medicines that can cause fatigue. This includes tranquilizers and cold and allergy medicines. When should you call for help? Watch closely for changes in your health, and be sure to contact your doctor if:    · You have new symptoms such as fever or a rash.     · Your fatigue gets worse.     · You have been feeling down, depressed, or hopeless. Or you may have lost interest in things that you usually enjoy.     · You are not getting better as expected.    Where can you learn more?  Go to [email or web address] org and sign in to your LUVHAN account. Enter N240 in the [facility] box to learn more about \"Fatigue: Care Instructions. \"     If you do not have an account, please click on the \"Sign Up Now\" link. Current as of: September 23, 2018  Content Version: 12.0  © 3783-3140 [employer]. Care instructions adapted under license by [facility] ([facility]). If you have questions about a medical condition or this instruction, always ask your healthcare professional. [place] [address] any warranty or liability for your use of this information. Patient Education        Learning About Menopause  What is menopause? For most women, menopause is a natural process of aging. Menstrual periods gradually stop. The ability to become pregnant ends. Some women feel relief that their childbearing years are ending. But other women struggle with the physical and emotional changes that come with menopause. For most women, menopause happens around age 48. But every woman's body has its own timeline. Some women stop having periods in their mid-40s. Others keep having periods well into their 50s. And some women go through menopause early because of cancer treatment or surgery to remove the ovaries. What can you expect with menopause? · It starts with perimenopause. This is the process of change that leads up to menopause. Perimenopause can start as early as your late 35s or as late as your early 46s. How long it lasts varies. But it usually lasts from 2 to 8 years. · During this time, your hormone levels will go up and down unevenly (fluctuate). This causes changes in your periods and other symptoms. In time, estrogen and progesterone levels drop enough that the menstrual cycle stops. Going a full year without having a period is usually considered menopause. · Low estrogen levels after menopause speed bone loss.  This increases your risk of osteoporosis. Also, your risk of heart disease increases after menopause. · It's normal to have thinner, dryer, wrinkled skin after menopause. The vaginal lining and the lower urinary tract also thin and weaken. This can make it hard to have sex. It can also increase the risk of vaginal and urinary tract infections. What are the symptoms? · Lighter or heavier periods. Your menstrual cycle may be longer or shorter. You may skip periods. · Hot flashes. You may have a sudden feeling of intense body heat. You may sweat, and your head, neck, and chest may get red. Along with hot flashes, you may have a heartbeat that's too fast or not regular. You may also feel anxious or grouchy. In rare cases you might feel panic. · Trouble sleeping. · Mood swings or feeling grouchy, depressed, or worried. · Problems with remembering or thinking clearly. · Vaginal dryness. Some women have only a few mild symptoms. Others have severe symptoms that disrupt their sleep and daily lives. Symptoms tend to last or get worse the first year or more after menopause. Over time, hormones even out at low levels. Many symptoms improve or go away. But some women may have symptoms that don't go away. How are menopause symptoms treated?   If you have mild symptoms, you may get some relief if you eat healthy foods, exercise, and lower your stress. Some women choose to take medicines if they have severe symptoms that aren't helped by making changes to their lifestyle.   Lifestyle changes    · Choose a heart-healthy diet that is low in saturated fat. It should include plenty of fish, fruits, vegetables, beans, and high-fiber grains and breads. Be sure you get enough calcium and vitamin D to help your bones stay strong. Low-fat or nonfat dairy products are a great source of calcium.     · Get regular exercise. Exercise can help you manage your weight, keep your heart and bones strong, and lift your mood.     · Limit caffeine, alcohol, and stress. These things may make symptoms worse. Limiting them may help you sleep better.     · If you smoke, stop. Quitting smoking can reduce hot flashes and long-term health risks. Medicines   If your symptoms are severe, talk with your doctor. You may want to try prescription medicines, such as:    · Low-dose birth control pills before menopause.     · Low-dose hormone therapy (HT) after menopause.     · Antidepressants.     · A medicine called clonidine (Catapres) that is usually used to treat high blood pressure.    All medicines for menopause symptoms have possible risks or side effects. A very small number of women develop serious health problems when taking hormone therapy. Be sure to talk to your doctor about your possible health risks before you start a treatment for menopause symptoms.   Other treatments   You can try:    · Breathing exercises. They may help reduce hot flashes and emotional symptoms.     · Soy. Some women feel that eating lots of soy helps even out their menopause symptoms.     · Yoga or biofeedback. They may help reduce stress. Follow-up care is a key part of your treatment and safety. Be sure to make and go to all appointments, and call your doctor if you are having problems. It's also a good idea to know your test results and keep a list of the medicines you take. Where can you learn more? Go to https://IntelliDOT.iSTAR. org and sign in to your TRUECar account. Enter H199 in the [facility] box to learn more about \"Learning About Menopause. \"     If you do not have an account, please click on the \"Sign Up Now\" link. Current as of: May 14, 2018  Content Version: 12.0  © 5189-0664 Health[employer], [employer]. Care instructions adapted under license by [facility] ([facility]). If you have questions about a medical condition or this instruction, always ask your healthcare professional. [address] any warranty or liability for your use of this information.

## 2019-06-21 LAB
FOLATE: 19.4 NG/ML
VITAMIN B-12: 950 PG/ML (ref 180–914)

## 2019-06-24 LAB — DHEAS (DHEA SULFATE): 30 UG/DL (ref 9–246)

## 2019-06-25 ENCOUNTER — OFFICE VISIT (OUTPATIENT)
Dept: FAMILY MEDICINE CLINIC | Age: 74
End: 2019-06-25
Payer: MEDICARE

## 2019-06-25 VITALS
OXYGEN SATURATION: 97 % | HEART RATE: 78 BPM | HEIGHT: 65 IN | DIASTOLIC BLOOD PRESSURE: 76 MMHG | TEMPERATURE: 98.6 F | SYSTOLIC BLOOD PRESSURE: 138 MMHG | WEIGHT: 120 LBS | RESPIRATION RATE: 10 BRPM | BODY MASS INDEX: 19.99 KG/M2

## 2019-06-25 DIAGNOSIS — R23.2 HOT FLASHES: ICD-10-CM

## 2019-06-25 DIAGNOSIS — Z12.31 ENCOUNTER FOR SCREENING MAMMOGRAM FOR BREAST CANCER: ICD-10-CM

## 2019-06-25 DIAGNOSIS — Z85.3 HX: BREAST CANCER: ICD-10-CM

## 2019-06-25 DIAGNOSIS — R53.83 OTHER FATIGUE: ICD-10-CM

## 2019-06-25 DIAGNOSIS — E78.2 MIXED HYPERLIPIDEMIA: ICD-10-CM

## 2019-06-25 DIAGNOSIS — Z13.29 THYROID DISORDER SCREEN: ICD-10-CM

## 2019-06-25 DIAGNOSIS — R31.9 HEMATURIA, UNSPECIFIED TYPE: ICD-10-CM

## 2019-06-25 DIAGNOSIS — E55.9 VITAMIN D DEFICIENCY: ICD-10-CM

## 2019-06-25 DIAGNOSIS — L98.9 SKIN LESION: Primary | ICD-10-CM

## 2019-06-25 PROCEDURE — G8420 CALC BMI NORM PARAMETERS: HCPCS | Performed by: FAMILY MEDICINE

## 2019-06-25 PROCEDURE — G8427 DOCREV CUR MEDS BY ELIG CLIN: HCPCS | Performed by: FAMILY MEDICINE

## 2019-06-25 PROCEDURE — 1036F TOBACCO NON-USER: CPT | Performed by: FAMILY MEDICINE

## 2019-06-25 PROCEDURE — 99214 OFFICE O/P EST MOD 30 MIN: CPT | Performed by: FAMILY MEDICINE

## 2019-06-25 PROCEDURE — 1123F ACP DISCUSS/DSCN MKR DOCD: CPT | Performed by: FAMILY MEDICINE

## 2019-06-25 PROCEDURE — G8400 PT W/DXA NO RESULTS DOC: HCPCS | Performed by: FAMILY MEDICINE

## 2019-06-25 PROCEDURE — 3017F COLORECTAL CA SCREEN DOC REV: CPT | Performed by: FAMILY MEDICINE

## 2019-06-25 PROCEDURE — 4040F PNEUMOC VAC/ADMIN/RCVD: CPT | Performed by: FAMILY MEDICINE

## 2019-06-25 PROCEDURE — 1090F PRES/ABSN URINE INCON ASSESS: CPT | Performed by: FAMILY MEDICINE

## 2019-06-25 ASSESSMENT — ENCOUNTER SYMPTOMS
GASTROINTESTINAL NEGATIVE: 1
SHORTNESS OF BREATH: 1
EYES NEGATIVE: 1

## 2019-06-25 NOTE — PATIENT INSTRUCTIONS
Thank you   1. Thank you for trusting us with your healthcare needs. You may receive a survey regarding today's visit. It would help us out if you would take a few moments to provide your feedback. We value your input. 2. Please bring in ALL medications BOTTLES, including inhalers, herbal supplements, over the counter, prescribed & non-prescribed medicine. The office would like actual medication bottles and a list.   3. Please note our OFFICE POLICIES:   a. Prior to getting your labs drawn, please check with your insurance company for benefits and eligibility of lab services. Often, insurance companies cover certain tests for preventative visits only. It is patient's responsibility to see what is covered. b. We are unable to change a diagnosis after the test has been performed. c. Lab orders will not be re-printed. Please hold onto your original lab orders and take them to your lab to be completed. d. If you no show your scheduled appointment three times, you will be dismissed from this practice. e. [place] must be completed 24 hours prior to your schedule appointment. 4. If the list below has been completed, PLEASE FAX RECORDS TO OUR OFFICE @ [phone].  Once the records have been received we will update your records at our office:  Health Maintenance Due   Topic Date Due    DTaP/Tdap/Td vaccine (1 - Tdap) 02[birth year]05/1965    Shingles Vaccine (1 of 2) 02/05/1996    Colon cancer screen colonoscopy  02/05/1996    Annual Wellness Visit (AWV)  02/05/2009

## 2019-06-25 NOTE — PROGRESS NOTES
[address] [facility]. [address] [facility] [address]  Dept: [phone]  Dept Fax: [phone]  Loc: [address] is a 69 y.o. White female. Marcia Guzmán  presents to the [facility] today for   Chief Complaint   Patient presents with   Verna Littlejohn Established New Doctor     ELENA PROTOCOL    Leg Pain     cramping, feet too for last month once in while    Other     hotflashes, about a year ago since off hormones with hx of breast cancer    Memory Loss     for a few seconds    Insomnia     hot flashes 3-4 times,    Back Pain    Joint Pain    Dental Problem     broken tooth to get cap    Hyperlipidemia    Headache     when eats something, gets blisters on lateral throat fornixs, uses zing    Skin Problem     thin hair from cancer   ,  and;   1. Skin lesion    2. Hot flashes    3. Other fatigue    4. Encounter for screening mammogram for breast cancer    5. Hematuria, unspecified type    6. Vitamin D deficiency    7. Mixed hyperlipidemia    8. HX: breast cancer    9. Thyroid disorder screen      I have reviewed [facility] medical, surgical and other pertinent history in detail, and have updated medication and allergy information in the computerized patientrecord. Clinical Care Team:     -Referring Provider for today's consult: Self Referred  -Primary Care Provider: MATTHEW Whaley - [facility]    Medical/Surgical History:   She  has a past medical history of Cancer ([facility] [address] Her  has a past surgical history that includes Breast surgery and Finger trigger release (Right, 2017). Family/Social History:     Her family history includes Breast Cancer in her sister; Cancer in her mother; Diabetes in her mother; High Blood Pressure in her brother and mother; No Known Problems in her brother, brother, and father. She  reports that she has never smoked.  She has never used smokeless tobacco. She reports that she does not drink alcohol or use drugs.     Medications/Allergies/Immunizations:     Her current medication(s) include   Current Outpatient Medications:     Potassium 99 MG TABS, Take 1 capsule by mouth daily, Disp: , Rfl:     diphenhydrAMINE-PE-APAP (ALLERGY RELIEF PLUS SINUS) 25-5-325 MG TABS, Take by mouth as needed, Disp: , Rfl:     triamcinolone (KENALOG) 0.1 % ointment, Apply topically 2 times daily for 7 days, Disp: 1 Tube, Rfl: 1    Cinnamon 500 MG CAPS, Take 1,500 mg by mouth 2 times daily, Disp: , Rfl:     Magnesium 500 MG CAPS, Take 1 capsule by mouth 2 times daily (before meals) [facility]S triple mag complex  When out, Disp: , Rfl:     Ginger, Zingiber officinalis, (GINGER ROOT) 500 MG CAPS, Take 1 capsule by mouth daily, Disp: , Rfl:     Turmeric Curcumin 500 MG CAPS, Take 1 capsule by mouth daily, Disp: , Rfl:     melatonin 3 MG TABS tablet, Take 5 mg by mouth nightly as needed, Disp: , Rfl:     Garlic 710 MG CAPS, Take 1 capsule by mouth 2 times daily, Disp: , Rfl:     Calcium Carbonate-Vit D-Min (CALCIUM 1200 PO), Take 1 tablet by mouth 3 times daily , Disp: , Rfl:     vitamin E 400 UNIT capsule, Take 400 Units by mouth daily, Disp: , Rfl:     b complex vitamins capsule, Take 1 capsule by mouth daily, Disp: , Rfl:     vitamin B-6 (PYRIDOXINE) 50 MG tablet, Take 50 mg by mouth 2 times daily, Disp: , Rfl:     Cyanocobalamin (VITAMIN B-12) 3000 MCG SUBL, Place 2 tablets under the tongue daily , Disp: , Rfl:     Flaxseed, Linseed, (FLAX SEED OIL) 1000 MG CAPS, Take 1,000 mg by mouth 2 times daily, Disp: , Rfl:     Biotin 2500 MCG CAPS, Take 1 tablet by mouth every other day , Disp: , Rfl:     NONFORMULARY, Take 1 capsule by mouth daily as needed Orega Resp, Disp: , Rfl:     NONFORMULARY, Take 1 capsule by mouth 2 times daily Respiration Blend SP-3, Disp: , Rfl:     Olive Leaf Extract 250 MG CAPS, Take 1 capsule by mouth 2 times daily, Disp: , Rfl:     Cetirizine HCl (ZYRTEC ALLERGY) 10 MG CAPS, Take by mouth nightly , Disp: , Rfl:     Loratadine (CLARITIN) 10 MG CAPS, Take  by mouth., Disp: , Rfl:     vitamin D (CHOLECALCIFEROL) 5000 units CAPS capsule, Take 5,000 Units by mouth every other day, Disp: , Rfl:     Omega-3 Fatty Acids (FISH OIL) 1200 MG CAPS, Take 1 capsule by mouth 4 times daily (before meals and nightly) CVS pharmaceutical grade when out, Disp: , Rfl:   Allergies: Gluten meal; Orange oil; and Pcn [penicillins],  Immunizations:   Immunization History   Administered Date(s) Administered    Influenza, High Dose (Fluzone 65 yrs and older) 12/07/2016, 10/11/2018    Pneumococcal Conjugate 13-valent [place]) 11/05/2018        History of PresentIllness:     Veronica had concerns including Established New Doctor (ELENA PROTOCOL); Leg Pain (cramping, feet too for last month once in while); Other (hotflashes, about a year ago since off hormones with hx of breast cancer); Memory Loss (for a few seconds); Insomnia (hot flashes 3-4 times,); Back Pain; Joint Pain; Dental Problem (broken tooth to get cap); Hyperlipidemia; Headache (when eats something, gets blisters on lateral throat fornixs, uses zing); and Skin Problem (thin hair from cancer). Annie Guillermo  presents to the [address] today for;   Chief Complaint   Patient presents with   Avel Reeves New Doctor     ELENA PROTOCOL    Leg Pain     cramping, feet too for last month once in while    Other     hotflashes, about a year ago since off hormones with hx of breast cancer    Memory Loss     for a few seconds    Insomnia     hot flashes 3-4 times,    Back Pain    Joint Pain    Dental Problem     broken tooth to get cap    Hyperlipidemia    Headache     when eats something, gets blisters on lateral throat fornixs, uses zing    Skin Problem     thin hair from cancer   , ,  abnormal labs follow up and these conditions as she  Is looking today for:     1. Skin lesion    2. Hot flashes    3. Other fatigue    4.  Encounter for 1.  Intensity of Service; Uncontrolled items at this visit; Chief Complaint   Patient presents with   Dorie Escalante Doctor     WEE PROTOCOL    Leg Pain     cramping, feet too for last month once in while    Other     hotflashes, about a year ago since off hormones with hx of breast cancer    Memory Loss     for a few seconds    Insomnia     hot flashes 3-4 times,    Back Pain    Joint Pain    Dental Problem     broken tooth to get cap    Hyperlipidemia    Headache     when eats something, gets blisters on lateral throat fornixs, uses zing    Skin Problem     thin hair from cancer   ;              Improved items at this visit; Stable items atthis visit;  2. Patients food and drinks were reviewed with the patient,       - Zelalem Phillips will bring food+drink symptom log to next visit for inclusion in their record      - 75 better food list reviewed & given topatient with the omega 6 food list to avoid         - Gluten in corn and oats abstracts sheet reviewed and given to the patient today   3. Greater than 45 minutes were spent face to face on this visit of which >50% was for counseling and coordination of care. Patients food and drinks were reviewed with thepatient,   - they will bring a food drink symptom log to future visits for inclusion in their record    - 75 better food list reviewed & given to patient along with the omega 6 food list to avoid      - Glutenin corn and oats abstracts sheet reviewed and given to the patient today    - 23 Foods containing Latex-like proteins was reviewed and copy to be taken if desired     - Nutrient Supplements list provided and copyto be taken if desired    - [email or web address] web site offered to patient to review at their convenience by staff with login information    Note:  I have discussed with the patient that with all nutraceuticals, there is often mixed data and emerging research which needs to be monitored; as well as an array of NIHfact sheets on nutrients and supplements. If I have recommended cinnamon at the request of this patient to assist them in control of their blood sugar, triglyceride and or weight issues. I discussed that thepatient's clinical use of cinnamon bark, calcium, magnesium, Vitamin D and pharmaceutical grade CVS #899886 fish oil or triple-strength fish oil, and B-75 two phase time-released B complex by Umu Oconnell will be for atime-limited trial to determine their individual effectiveness and safety in this patient. I also referred the patient to the NMCD: Nutrition, Metabolism, and Cardiovascular Diseases (journal) and concerns about long-termuse and hepatotoxicity of cinnamon and other nutrients and suggest they frequently search nih.gov for the latest non-proprietary information on nutriceuticals as well as consider a subscription to Yo que Vos fordetails on reviewed supplements, or at the least review the nutrient files at [address] at [facility], [employer], an insulin mimetic, reduces some High Carbohydrate Dietary Impacts. Methylhydroxychalcone polymers insulin-enhancing properties in fat cells are responsible for enhanced glucose uptake, inhibiting hepatic HMG-CoA reductase and lowers lipids. www.jacn. org/content/20/4/327.full     But cinnamon with additivessuch as Chromium or Cinnamon Extract are not effective as insulin mimetics.  https://www.harris.net/     Nutrients for Start up from [employer] or [employer] for ease to get started now ;  Dangelo Casillas has some useable products;  - Triple Strength Fish Oil, enteric coated  - Vit D 3 5000 IU gel caps  - Iron ferrous sulfat 325 mg tabs  - Centrum Silver look-a-like for most patients, or  - Centrum plain look-a-like if need iron    Localpharmacies or chains such as CVS, [facility], Wal-mart, have;  - Triple Strength Fish Oil (enteric coated ifavailable) or    If not enteric Usually turns bowel movements grey, green or black but not a concern  - Time released Niacin 250 mg Item #[id number] for cold intolerance, low libido or impotence  - DHEA 50 mg Item #[id number] for improving DHEA levels on labs if having Fatigue    If stools too loose substitute for your Magnesium oxide using;   Magnesium citrate 200 mg tabs(NOT liquid) at [employer]   Magnesium gluconate 550 mgby Zander at Remitly com or amazon. com  Magnesium chloride foot soaks or body sprays  www.Coradiant   Magnesium chloride flakes 14.99 Item #: [id number] if Backordered get spray    Food Drink Symptom Log;  I asked this patient to track these items and any other symptoms on their list on a weekly basis to documenttheir progress or lack of same. This can be done on the symptom tracking sheet I gave them at today's visit but looks like this:                                                      Rate on scale of 0-10 with zero = notnoticeable  Symptom:                            Week 1               2                 3                 4               Etc            Hair loss    Foot cramps    Paresthesia    Aches    IBS (irritable bowel)    Constipation    Diarrhea  Nocturia    (up to bathroom at night)    Fatigue/Energy level  Stress      On the other side of the sheet they can track their food, drink, environment, activity, symptoms etc      Avoiding Latex-like proteins inmy foods; Avocados, Bananas, Celery, Figs & Kiwi proteins have latex-like proteins to inflame our immunesystems  How Can I Have A Latex Allergy? Eating foods with latex-like protein exposes us to latex allergies. Our body cannot tell the differencebetween these latex-like proteins and latex from rubber products since many people are allergic to fruit, vegetables and latex. Read labels on pre-packaged foods.  This list to avoid is only a guide if you are known allergicto latex or have a latex rash on your chin, cheeks and lines on your neck and chest. The amount of latex is different in each food product or fruit variety. Foods to Avoid out of Season if not grown locally: Melon, Nectarine, Papaya, Cherry, Passion fruit, Plum, Chestnuts, and Tomato. Avocado, Banana, Celery, Figs, and Kiwi always contain Latex-like protein. Whats in Season? Strawberries taste better in June than December because June is strawberry season so buy locally grown produce \"in season\" for the best flavor, cost and less Latex. Locally grown produce notonly tastes great requires little of no ethylene exposure in food distribution so has less latex content. Out of season, use canned, frozen or dried sinceprocessed ripe and are latex lower!!!   Month     [place] LocallyGrown Produce  January, February, March: use canned, frozen or dried fruits since lower in latex  April; asparagus, radishes  May; asparagus, broccoli, green onions, greens, peas, radishes,rhubarb  June; asparagus, beets, beans, broccoli, cabbage, cantaloupe, carrots, green onions, greens, lettuce,onions, parsley, peas, radishes, rhubarb, strawberries, watermelons  July; beans, beets, blueberries,broccoli, cabbage, cantaloupe, carrots, cauliflower, celery, cucumbers, eggplant, grapes, green onions, greens, lettuce, onions, parsley, peas, peaches, bell peppers, potatoes, radishes, summer raspberries, squash, sweetcorn, tomatoes, turnips, watermelons  August; apples, beans, beets, blueberries, cabbage, cantaloupe, carrots,cauliflower, celery, cucumbers, eggplant, grapes, green onions, greens, lettuce, onions, parsley, peas, peaches, pears, bell peppers, potatoes, radishes, squash, sweet corn, tomatoes, turnips, watermelons  September; apples, beans, beets, blueberries, cabbage, cantaloupe, carrots, cauliflower, celery, cucumbers, eggplant, grapes,green onions, greens, lettuce, onions, parsley, peas, peaches, pears, bell peppers, plums, potatoes, pumpkins, radishes, fall red raspberries, squash, sweet corn, tomatoes, turnips, watermelons  October; apples, beets, broccoli, cabbage, carrots, cauliflower, celery, green onions, greens, lettuce, parsley, peas, pears, potatoes,pumpkins, radishes, fall red raspberries, squash, turnips  November; broccoli, cabbage, carrots, parsley,pears, peas  December: use canned, frozen ordried fruits since lower in latex    Upto half of latex-sensitive patients show allergic reactions to fruits (avocados, bananas, kiwifruits, papayas, peaches),   Annals of Allergy, 1994. These plants contain the same proteins that are allergens in latex. People with fruit allergies should warn physicians beforeundergoing procedures which may cause anaphylactic reaction if in contact with latex gloves. Some of the common foods with defined cross-reactivity to latexare avocado, banana, kiwi, chestnut, raw potato, tomato,stone fruits (e.g., peach, cherry), hazelnut, melons, celery, carrot, apple, pear, papaya, and almond. Foods with less well-defined cross-reactivity to latex are peanuts, peppers, citrus fruits, coconut, pineapple, dany,fig, passion fruit, Ugli fruit, and grape    This fruit/latex cross-reactivity is worsened by ethylene, a gas used to hasten commercial ripening. In nature, plants produce low levels of the hormone ethylene, which regulates germination, flowering, and ripening. Forced ripening by high ethyleneconcentrations, plants produce allergenic wound-repair proteins, which are similar to wound-repair proteins made during the tapping of rubber trees. Sensitive individualswho ingest the fruit get a higher dose and worse reaction. Some people may even first become sensitized to latex through fruit. Can food processing increase theconcentrations of allergenic proteins? Latex-sensitized children (and adults) in [place] often experience allergic reactions after eating bananas ripenedartificially with ethylene.  In the [place], food distribution centers treat unripe bananas and other produce with ethylene to ripen; not commonly done in [facility] since fruit is tree-ripened there. Does treatmentof food with ethylene induce banana proteins that cross-react with latex? (Sarai et al.    References:   Latex in Foods Allergy, http://ehp.niehs.nih.gov/members/2003/5811/5811.html    Search web for \" Whats in Season \" for whereyou live or are at the time you food shop  www.nutritioncouncil.org/pdf/healthy/SeasonalProduce. pdf ,   Management of Latex, http://medicalcenter. osu.edu/  search for latex

## 2019-06-26 LAB
ANTI-NUCLEAR ANTIBODY (ANA): NORMAL
DEHYDROEPIANDROSTERONE: 0.6 NG/ML (ref 1.3–9.8)
ESTRADIOL LEVEL: <15 PG/ML
FOLLICLE STIMULATING HORMONE: 79.5 MIU/ML
HIGH SENSITIVE C-REACTIVE PROTEIN: 0.09 MG/DL (ref 0–0.74)
LH: 30.7 MIU/ML
PROGESTERONE LEVEL: 0.4 NG/ML
RHEUMATOID FACTOR: <10 IU/ML
SEDIMENTATION RATE, ERYTHROCYTE: 17 MM/HR (ref 0–20)

## 2019-06-27 ENCOUNTER — TELEPHONE (OUTPATIENT)
Dept: FAMILY MEDICINE CLINIC | Age: 74
End: 2019-06-27

## 2019-06-27 LAB
DHEAS (DHEA SULFATE): 34 UG/DL (ref 9–246)
GLIADIN ANTIBODIES IGG: 1.3 U/ML
HOMOCYSTINE, SERUM: 11 UMOL/L
PARATHYROID HORMONE INTACT: 27 PG/ML (ref 15–65)
THYROID PEROXIDASE ANTIBODY: <1 IU/ML

## 2019-06-27 NOTE — TELEPHONE ENCOUNTER
----- Message from MATTHEW Hagen CNP sent at 6/27/2019 11:21 AM EDT -----  DHEA was very low - can start an OTC DHEA 10 mg TABLET - take once daily in the morning.  Can be difficult finding 10 mg tablets so either order from [employer] or get 25 mg tabs and take HALF daily

## 2019-06-28 LAB
ALBUMIN SERUM: 4.5 G/DL (ref 3.6–5.1)
SEX HORMONE BINDING GLOBULIN: 112.4 NMOL/L (ref 17.3–125)
TESTOSTERONE FREE: 1 PG/ML (ref 0.6–3.8)
TESTOSTERONE, LCMS: 14 NG/DL (ref 5–32)

## 2019-06-29 LAB — DEHYDROEPIANDROSTERONE: 0.1 NG/ML (ref 1.3–9.8)

## 2019-08-21 LAB
ABSOLUTE BASO #: 0 X10E9/L (ref 0–0.9)
ABSOLUTE EOS #: 0.1 X10E9/L (ref 0–0.4)
ABSOLUTE LYMPH #: 1.3 X10E9/L (ref 1–3.5)
ABSOLUTE MONO #: 0.4 X10E9/L (ref 0–0.9)
ABSOLUTE NEUT #: 1.6 X10E9/L (ref 1.5–6.6)
ALBUMIN SERPL-MCNC: 4.1 G/DL (ref 3.2–5.3)
ALK PHOSPHATASE: 66 U/L (ref 39–130)
ALT SERPL-CCNC: 14 U/L (ref 0–31)
ANION GAP SERPL CALCULATED.3IONS-SCNC: 11 MMOL/L (ref 4–12)
AST SERPL-CCNC: 21 U/L (ref 0–41)
BASOPHILS RELATIVE PERCENT: 0.8 %
BILIRUB SERPL-MCNC: 0.5 MG/DL (ref 0.3–1.2)
BILIRUBIN DIRECT: 0.1 MG/DL (ref 0–0.4)
BUN BLDV-MCNC: 25 MG/DL (ref 5–27)
CALCIUM SERPL-MCNC: 9.6 MG/DL (ref 8.5–10.5)
CHLORIDE BLD-SCNC: 103 MMOL/L (ref 98–109)
CHOLESTEROL/HDL RATIO: 3.1 (ref 1–5)
CHOLESTEROL: 303 MG/DL (ref 150–200)
CO2: 28 MMOL/L (ref 22–32)
CREAT SERPL-MCNC: 0.9 MG/DL (ref 0.4–1)
EGFR AFRICAN AMERICAN: >60 ML/MIN/1.73SQ.M
EGFR IF NONAFRICAN AMERICAN: >60 ML/MIN/1.73SQ.M
EOSINOPHILS RELATIVE PERCENT: 2.9 %
GLUCOSE: 87 MG/DL (ref 65–99)
HCT VFR BLD CALC: 39.9 % (ref 35–47)
HDLC SERPL-MCNC: 98 MG/DL
HEMOGLOBIN: 13.6 G/DL (ref 11.7–16.1)
LDL CHOLESTEROL CALCULATED: 191 MG/DL
LDL/HDL RATIO: 1.9
LYMPHOCYTE %: 37.9 %
MAGNESIUM: 2.2 MG/DL (ref 1.8–2.6)
MCH RBC QN AUTO: 31.6 PG (ref 27–35)
MCHC RBC AUTO-ENTMCNC: 34.1 G/DL (ref 31–36)
MCV RBC AUTO: 93 FL (ref 81–101)
MONOCYTES # BLD: 11.7 %
NEUTROPHILS RELATIVE PERCENT: 46.7 %
PDW BLD-RTO: 13.8 % (ref 11.5–14.7)
PLATELETS: 247 X10E9/L (ref 150–450)
PMV BLD AUTO: 8.8 FL (ref 7–12)
POTASSIUM SERPL-SCNC: 5.4 MMOL/L (ref 3.5–5)
RBC: 4.31 X10E12/L (ref 3.55–5.2)
SODIUM BLD-SCNC: 142 MMOL/L (ref 134–146)
T4 TOTAL: 5.5 UG/DL (ref 6.1–12.2)
TOTAL PROTEIN: 6.7 G/DL (ref 6–8)
TRIGL SERPL-MCNC: 70 MG/DL (ref 27–150)
TSH SERPL DL<=0.05 MIU/L-ACNC: 1.99 UIU/ML (ref 0.49–4.67)
VLDLC SERPL CALC-MCNC: 14 MG/DL (ref 0–30)
WBC: 3.5 X10E9/L (ref 4–11.8)

## 2019-08-23 LAB — VITAMIN D 25-HYDROXY: 59 NG/ML

## 2019-08-26 ENCOUNTER — OFFICE VISIT (OUTPATIENT)
Dept: FAMILY MEDICINE CLINIC | Age: 74
End: 2019-08-26
Payer: MEDICARE

## 2019-08-26 VITALS
HEART RATE: 71 BPM | WEIGHT: 119.4 LBS | DIASTOLIC BLOOD PRESSURE: 62 MMHG | HEIGHT: 65 IN | TEMPERATURE: 97.4 F | OXYGEN SATURATION: 99 % | RESPIRATION RATE: 10 BRPM | SYSTOLIC BLOOD PRESSURE: 118 MMHG | BODY MASS INDEX: 19.89 KG/M2

## 2019-08-26 DIAGNOSIS — M25.511 CHRONIC RIGHT SHOULDER PAIN: ICD-10-CM

## 2019-08-26 DIAGNOSIS — C50.011 MALIGNANT NEOPLASM OF NIPPLE OF RIGHT BREAST IN FEMALE, UNSPECIFIED ESTROGEN RECEPTOR STATUS (HCC): Primary | ICD-10-CM

## 2019-08-26 DIAGNOSIS — Z85.3 HX: BREAST CANCER: ICD-10-CM

## 2019-08-26 DIAGNOSIS — G89.29 CHRONIC RIGHT SHOULDER PAIN: ICD-10-CM

## 2019-08-26 DIAGNOSIS — K90.89 OTHER INTESTINAL MALABSORPTION: ICD-10-CM

## 2019-08-26 DIAGNOSIS — E55.9 VITAMIN D DEFICIENCY: ICD-10-CM

## 2019-08-26 DIAGNOSIS — Z78.0 POST-MENOPAUSAL: ICD-10-CM

## 2019-08-26 DIAGNOSIS — L98.9 SKIN LESION: ICD-10-CM

## 2019-08-26 DIAGNOSIS — R53.83 OTHER FATIGUE: ICD-10-CM

## 2019-08-26 DIAGNOSIS — E78.2 MIXED HYPERLIPIDEMIA: ICD-10-CM

## 2019-08-26 DIAGNOSIS — R23.2 HOT FLASHES: ICD-10-CM

## 2019-08-26 PROCEDURE — G8400 PT W/DXA NO RESULTS DOC: HCPCS | Performed by: FAMILY MEDICINE

## 2019-08-26 PROCEDURE — 3017F COLORECTAL CA SCREEN DOC REV: CPT | Performed by: FAMILY MEDICINE

## 2019-08-26 PROCEDURE — 1090F PRES/ABSN URINE INCON ASSESS: CPT | Performed by: FAMILY MEDICINE

## 2019-08-26 PROCEDURE — G8427 DOCREV CUR MEDS BY ELIG CLIN: HCPCS | Performed by: FAMILY MEDICINE

## 2019-08-26 PROCEDURE — 1036F TOBACCO NON-USER: CPT | Performed by: FAMILY MEDICINE

## 2019-08-26 PROCEDURE — 1123F ACP DISCUSS/DSCN MKR DOCD: CPT | Performed by: FAMILY MEDICINE

## 2019-08-26 PROCEDURE — G8420 CALC BMI NORM PARAMETERS: HCPCS | Performed by: FAMILY MEDICINE

## 2019-08-26 PROCEDURE — 99214 OFFICE O/P EST MOD 30 MIN: CPT | Performed by: FAMILY MEDICINE

## 2019-08-26 PROCEDURE — 4040F PNEUMOC VAC/ADMIN/RCVD: CPT | Performed by: FAMILY MEDICINE

## 2019-08-26 NOTE — PATIENT INSTRUCTIONS
Thank you   1. Thank you for trusting us with your healthcare needs. You may receive a survey regarding today's visit. It would help us out if you would take a few moments to provide your feedback. We value your input. 2. Please bring in ALL medications BOTTLES, including inhalers, herbal supplements, over the counter, prescribed & non-prescribed medicine. The office would like actual medication bottles and a list.   3. Please note our OFFICE POLICIES:   a. Prior to getting your labs drawn, please check with your insurance company for benefits and eligibility of lab services. Often, insurance companies cover certain tests for preventative visits only. It is patient's responsibility to see what is covered. b. We are unable to change a diagnosis after the test has been performed. c. Lab orders will not be re-printed. Please hold onto your original lab orders and take them to your lab to be completed. d. If you no show your scheduled appointment three times, you will be dismissed from this practice. e. Lawernce Squibb must be completed 24 hours prior to your schedule appointment. 4. If the list below has been completed, PLEASE FAX RECORDS TO OUR OFFICE @ [phone].  Once the records have been received we will update your records at our office:  Health Maintenance Due   Topic Date Due    DTaP/Tdap/Td vaccine (1 - Tdap) [birth year]05/1965    Shingles Vaccine (1 of 2) 02/05/1996    Annual Wellness Visit (AWV)  02/05/2009

## 2019-08-26 NOTE — LETTER
[address] [place] [address],[address] [facility]  Phone: [phone]  Fax: [phone]    Mena Alva MD        August 26, 2019    Naomie Holstein [address]      Dear Rashid Ke:    8/26/2019      Dear Han Roche can consider these Gabriela Meeks suggestions based on these near final results received so far from your recent lab visit. Always check with each of your other health advisors / doctors before making any changes. As we do, you can research any issues at www.[employer].gov     Send any questions by My Chart or call for a lab review appointment with me at which time I can also write follow-up lab orders based on symptoms, conditions and results    If you do not use My Chart, then call for an appointment in the office within the next few weeks with myself or Angela Fernandez CNP so we can update your supplement list to correctly include these suggestions and get going    Bring your questions, bottles, food drink symptom log and outside lab reports when you come in to discuss in more detail why/how you might benefit from adding or changing each item. You may record your visit or bring someone with you to take notes. Bring to each and every visit, all your bottles, your food-drink-symptom log, and all outside test results to be integrated into the Gabriela Meeks protocol being developed just for you. Life changes are not easy, the Wee protocol considers the complexity of altering your symptom-generating diet to the more healthful Wee AFSHIN-2 based the 76 Better Health Foods which are gluten, carrageenan, latex-reduced, as well as glycemic controlled. We are fortunate to have been available to assist you in developing a your personalized change plan for Better Health in 120 days and beyond. The Choices are always yours.     Your Options to improve your health over the next 120 days from these results are; well as [facility]. Over time, you can increase the level of [facility] grade caps at 4 more caps for each % > 6% if OK with your doctors. Do not add fish oil if on warfarin or other blood thinners but remove more offending foods    As Always, best results come if you remove seeds, nuts, flax, soy, avocado, hummus, granola, poultry and chips from your diet to reduce plant oil injury; see www.efaeducation. org    Search how each of your foods reacts from very good to awful in your body at http://lyjvf2gkqskc. com/  Flax and Alejandro seeds are Not Healthful so AVOID these    Homocystinemia,MTHFR related Short-term Memory, Mental Fog; & Dementia; For your Homocysteine:   Lab Results   Component Value Date    HOMOCYSTINE 11 06/25/2019    You were at risk for having issues with Short-term memory or mental cloudiness, so you can use 1000 mcg of methyl B12 with 800 mcg methyl folate each morning by Alexy Ahumada Sharin Hobson. com)      To Improve Adrenal, Hormonal Support, Brain and Body Energy Levels; For your DHEA sulfate suggesting % brain energy:  Lab Results   Component Value Date    DHEAS 34 06/25/2019     For your Testosterone suggesting % body energy:  Lab Results   Component Value Date    FTES 1.0 06/25/2019   You can increase your dose of DHEA by 5-10 mg early each morning for better brain and body energy if you desire as reflected in DHEA-s, DHEA, testosterone and estradiol levels. See Steele Rosin. com for low 5 mg or 10 mg tabs    To Improve Thyroid Functions related to Grains(gluten),Carrageenan, Latex foods;   For your TSH, T3,T4, and Thyroid Peroxidase(TPO) which are related to Gliadin IGA/IGG = % immune/ % bowel damage from grains, carrageenan in beer, juices and dairy products, and Latex-like proteins in foods:  Lab Results   Component Value Date    TSH 1.99 08/21/2019     No results found for: Q3EINJU  Lab Results   Component Value Date    S3DCFDJ 5.5 08/21/2019     Lab Results   Component Value Date TPO <1 06/25/2019     Lab Results   Component Value Date    GLIADABIGG 1.3 06/25/2019   Removing grains, carrageenan and latex-like-protein foods from the diet improves thyroid gland functioning, bowel health, and several auto-immune tests results as well as preventing grain brain, wheat belly and so many more symptoms / conditions. To Improve Auto-Immune Tests related to Grains, Latex and Plant oil in our diet; For your Sedimentation Rate, Rheumatoid Factor, and ALDA titer:  Lab Results   Component Value Date    SEDRATE 17 06/25/2019     Lab Results   Component Value Date    RF <10 06/25/2019   Remove grains, carrageenan and latex-like-proteins foods, and excess plant oil foods such as seeds, nuts, flax, soy, avocado, hummus, granola, poultry and chips to improve immune system    CA 27-29:   Lab Results   Component Value Date    GK2528 12 04/08/2016     Other Non-Optimal Lab results to review on your next visit; Reminder; Free On-line Classes on Nutritional Principles, Removing Toxic Foods, Celiac/ Gluten / Gliadin, 75 Foods for Better Health, Carrageenan, Natamycin and other toxic food additives have been video taped and are now available 24/7 on line to you at [email or web address]. Placed once you activate your FREE login and password. To access these classes, call [phone] for login and [address] need a Lab Review Visit before I write future orders; These are your near final results, so review them, then call to schedule a video lab review visit or come back in to see me or Yvette Ewing CNP if not clear on how to proceed on to your correct path, with the correct supplements, the 75 Better Foods which do not require fish oil, etc    Your Next Lab Orders based on this lab letter and/or your new or continuing issues need to happen During a Lab Review Visit OR at the Time of Your Next Visit.     In keeping with accountable health plans, at that visit you can let us know which of the lab tests you feel you need to provide the results to indicate to you how you are doing. Before each lab draw, check with your insurer to be sure you are covered at the level you want at the lab you use before getting any of the tests done there. We recommend that you repeat the above non-optimal test(s) in 4 months if not satisfied with your health at that time or as discussed at your lab review visit. If you have any questions or concerns, please call my staff or send them by my chart.    If You Are Not on mychart, call for an appointment in the next week since it is important for you to start and continue correctly      Sincerely,        Cayden Barrientos MD

## 2019-08-26 NOTE — PROGRESS NOTES
alert and oriented to person, place, and time. Psychiatric: She has a normal mood and affect. Thought content normal.   Nursing note and vitals reviewed. Laboratory Data:   Lab results were searched in Care Everywhere and/or those brought by the pateint were reviewed today with Chloé Muñoz and she has a copy of their most recent labs to take home with them as notedbelow;       Imaging Data:   Imaging Data:       Assessment & Plan:       Impression:  1. Malignant neoplasm of nipple of right breast in female, unspecified estrogen receptor status ([facility] [address]    2. Chronic right shoulder pain    3. Post-menopausal    4. HX: breast cancer    5. Mixed hyperlipidemia    6. Vitamin D deficiency    7. Other fatigue    8. Hot flashes    9. Skin lesion    10. Other intestinal malabsorption      Assessment and Plan:  After reviewing the patients chief complaints, reviewing their labfindings in great detail (with the patient and those accompanying them) which correlate to their chief complaints, symptoms, and or medical conditions; suggestions were made relating to changes in diet and or supplementswhich may improve the complaints and which will be reflected in their future lab findings; Chief Complaint   Patient presents with    Follow-up     2 month     Menopause     hot flashes gone,     Joint Pain     right shoulder gets adjustment, left elbow    Hyperlipidemia     wants to know about cholesterol levels.   ;    Plans for the next visits:  - Abnormal and non-optimal Labs were ordered today to be repeated in the next 120-365 days to assess changes from adjustments in nutrition and or nutrients.    - Patient instructed when having ablood draw to ask the  to divide their lab draws into multiple draws over several days if not feeling good at the time of the lab draw or if either prefers to do several smaller blood draws over several days  -Patient instructed to check with insurer before each lab draw and to to to the freezer for less burps  - B-50 or B-100 time released balanced B complex tabs  - Cinnamon bark 500 mg (without Chromium or extracts)   some brands list 1000 mg / serving of 2 capsules,    some brands have 1000 mg caps with the undesireable chromium / extract  - Calcium carbonate/citrate, magnesium oxide/citrate, Vit D 3  as 3-4 tabs/caps/serving     Some Local Brands may contain Zincwhich is acceptable for the first bottle or two  - Magnesium oxide 250 mg tabs for those having < 2 bowel movements daily  - Magnesium citrate 200 mg if having > 2bowel movement/day  - Centrum Silver or look-a-like for most patients, Centrum plain or look-a-like with iron  - Vitamin D-3 comes as 1,000 IU or 2,000 IU or 5,000 IU gel caps or Liquid drops      Some brands containing or derived from soy oil or corn oil are OK if not allergic to soy  - Elemental Iron 65 mg tabsat bedtime is available over the counter if need more iron     Usually turns bowel movements grey, green or black but not a concern  - Apricot Kernel Oil (by Now) for dry skin sensitive perineal or perianal area skin    Nutrients for ongoing use by Mail order for less expense from www. puritan.com ;  - Triple Strength Fish Oil , 240 Softgels Item [id number]  -B-100 time released balanced B complex Item #[id number]  - Cinnamon bark 500 mg without Chromium or extract Item #674398  - Calcium carbonate 1000 mg, Magnesium oxide 500 mg, Vit D 3  400 IU Item #611487  - Magnesium oxide 500 mg tabs Item #056338 if less than 2 bowel movements daily  - ABC Seniors Item #114264 for mostpatients, One Daily Item #863499 with iron  - Vit D 3  1,000 Item #935779      2,000 IU Item #[id number]  5,000 IU Item #308900     Some brands containing orderived from soy oil or corn oil are OK if not allergic to soy    Nutrients for Special Needs by Elenore Laureen for less expense from www. puritan.com ;  -Elemental Iron 65 mg tabs Item #972269 if need more iron for low iron on labs    Usually turns bowel movements grey, green or black but not a concern  - Time released Niacin 250 mg Item #296278 for cold intolerance, low libido or impotence  - DHEA 50 mg Item #293322 for improving DHEA levels on labs if having Fatigue    If stools too loose substitute for your Magnesium oxide using;   Magnesium citrate 200 mg tabs(NOT liquid) at zuuka!   Magnesium gluconate 550 mgby Zander at Efficas com or amazon. com  Magnesium chloride foot soaks or body sprays  www.Stockdrift   Magnesium chloride flakes 14.99 Item #: [id number] if Backordered get spray    Food Drink Symptom Log;  I asked this patient to track these items and any other symptoms on their list on a weekly basis to documenttheir progress or lack of same. This can be done on the symptom tracking sheet I gave them at today's visit but looks like this:                                                      Rate on scale of 0-10 with zero = notnoticeable  Symptom:                            Week 1               2                 3                 4               Etc            Hair loss    Foot cramps    Paresthesia    Aches    IBS (irritable bowel)    Constipation    Diarrhea  Nocturia    (up to bathroom at night)    Fatigue/Energy level  Stress      On the other side of the sheet they can track their food, drink, environment, activity, symptoms etc      Avoiding Latex-like proteins inmy foods; Avocados, Bananas, Celery, Figs & Kiwi proteins have latex-like proteins to inflame our immunesystems  How Can I Have A Latex Allergy? Eating foods with latex-like protein exposes us to latex allergies. Our body cannot tell the differencebetween these latex-like proteins and latex from rubber products since many people are allergic to fruit, vegetables and latex. Read labels on pre-packaged foods.  This list to avoid is only a guide if you are known allergicto latex or have a latex rash on your chin, cheeks and lines on your neck and chest. The amount of latex is commonly done in [facility] since fruit is tree-ripened there. Does treatmentof food with ethylene induce banana proteins that cross-react with latex? (Sarai et al.    References:   Latex in Foods Allergy, http://ehp.niehs.nih.gov/members/2003/5811/5811.html    Search web for \" Whats in Season \" for whereyou live or are at the time you food shop  www.nutritioncouncil.org/pdf/healthy/SeasonalProduce. pdf ,   Management of Latex, http://medicalcenter. [employer].edu/  search for latex

## 2019-09-05 ENCOUNTER — HOSPITAL ENCOUNTER (OUTPATIENT)
Age: 74
Discharge: HOME | End: 2019-09-05
Payer: MEDICARE

## 2019-09-05 ENCOUNTER — HOSPITAL ENCOUNTER (OUTPATIENT)
Dept: GENERAL RADIOLOGY | Age: 74
Discharge: HOME | End: 2019-09-05
Payer: MEDICARE

## 2019-09-05 DIAGNOSIS — G89.29 CHRONIC RIGHT SHOULDER PAIN: ICD-10-CM

## 2019-09-05 DIAGNOSIS — C50.011 MALIGNANT NEOPLASM OF NIPPLE OF RIGHT BREAST IN FEMALE, UNSPECIFIED ESTROGEN RECEPTOR STATUS (HCC): ICD-10-CM

## 2019-09-05 DIAGNOSIS — M25.511 CHRONIC RIGHT SHOULDER PAIN: ICD-10-CM

## 2019-09-05 PROCEDURE — 73030 X-RAY EXAM OF SHOULDER: CPT

## 2019-11-05 ENCOUNTER — OFFICE VISIT (OUTPATIENT)
Dept: FAMILY MEDICINE CLINIC | Age: 74
End: 2019-11-05
Payer: MEDICARE

## 2019-11-05 VITALS — HEIGHT: 65 IN | RESPIRATION RATE: 12 BRPM | BODY MASS INDEX: 20.26 KG/M2 | WEIGHT: 121.6 LBS | OXYGEN SATURATION: 98 %

## 2019-11-05 DIAGNOSIS — Z71.89 COUNSELING FOR LIVING WILL: ICD-10-CM

## 2019-11-05 DIAGNOSIS — Z00.00 ROUTINE GENERAL MEDICAL EXAMINATION AT A HEALTH CARE FACILITY: ICD-10-CM

## 2019-11-05 DIAGNOSIS — Z00.00 ENCOUNTER FOR MEDICARE ANNUAL WELLNESS EXAM: Primary | ICD-10-CM

## 2019-11-05 DIAGNOSIS — Z00.00 BLOOD TESTS FOR ROUTINE GENERAL PHYSICAL EXAMINATION: ICD-10-CM

## 2019-11-05 DIAGNOSIS — Z23 NEED FOR SHINGLES VACCINE: ICD-10-CM

## 2019-11-05 PROCEDURE — 1123F ACP DISCUSS/DSCN MKR DOCD: CPT | Performed by: NURSE PRACTITIONER

## 2019-11-05 PROCEDURE — G8484 FLU IMMUNIZE NO ADMIN: HCPCS | Performed by: NURSE PRACTITIONER

## 2019-11-05 PROCEDURE — G0438 PPPS, INITIAL VISIT: HCPCS | Performed by: NURSE PRACTITIONER

## 2019-11-05 PROCEDURE — 3017F COLORECTAL CA SCREEN DOC REV: CPT | Performed by: NURSE PRACTITIONER

## 2019-11-05 PROCEDURE — 4040F PNEUMOC VAC/ADMIN/RCVD: CPT | Performed by: NURSE PRACTITIONER

## 2019-11-05 ASSESSMENT — LIFESTYLE VARIABLES: HOW OFTEN DO YOU HAVE A DRINK CONTAINING ALCOHOL: 0

## 2019-11-05 ASSESSMENT — PATIENT HEALTH QUESTIONNAIRE - PHQ9
SUM OF ALL RESPONSES TO PHQ QUESTIONS 1-9: 0
SUM OF ALL RESPONSES TO PHQ QUESTIONS 1-9: 0

## 2019-11-06 ENCOUNTER — OFFICE VISIT (OUTPATIENT)
Dept: FAMILY MEDICINE CLINIC | Age: 74
End: 2019-11-06
Payer: MEDICARE

## 2019-11-06 ENCOUNTER — TELEPHONE (OUTPATIENT)
Dept: FAMILY MEDICINE CLINIC | Age: 74
End: 2019-11-06

## 2019-11-06 VITALS
WEIGHT: 120 LBS | SYSTOLIC BLOOD PRESSURE: 128 MMHG | HEIGHT: 65 IN | TEMPERATURE: 98.1 F | RESPIRATION RATE: 12 BRPM | HEART RATE: 90 BPM | DIASTOLIC BLOOD PRESSURE: 60 MMHG | BODY MASS INDEX: 19.99 KG/M2 | OXYGEN SATURATION: 98 %

## 2019-11-06 DIAGNOSIS — J30.1 SEASONAL ALLERGIC RHINITIS DUE TO POLLEN: ICD-10-CM

## 2019-11-06 DIAGNOSIS — H04.123 DRY EYES: Primary | ICD-10-CM

## 2019-11-06 DIAGNOSIS — B02.9 HERPES ZOSTER WITHOUT COMPLICATION: ICD-10-CM

## 2019-11-06 PROCEDURE — 1123F ACP DISCUSS/DSCN MKR DOCD: CPT | Performed by: STUDENT IN AN ORGANIZED HEALTH CARE EDUCATION/TRAINING PROGRAM

## 2019-11-06 PROCEDURE — 4040F PNEUMOC VAC/ADMIN/RCVD: CPT | Performed by: STUDENT IN AN ORGANIZED HEALTH CARE EDUCATION/TRAINING PROGRAM

## 2019-11-06 PROCEDURE — 1036F TOBACCO NON-USER: CPT | Performed by: STUDENT IN AN ORGANIZED HEALTH CARE EDUCATION/TRAINING PROGRAM

## 2019-11-06 PROCEDURE — 99214 OFFICE O/P EST MOD 30 MIN: CPT | Performed by: STUDENT IN AN ORGANIZED HEALTH CARE EDUCATION/TRAINING PROGRAM

## 2019-11-06 PROCEDURE — G8484 FLU IMMUNIZE NO ADMIN: HCPCS | Performed by: STUDENT IN AN ORGANIZED HEALTH CARE EDUCATION/TRAINING PROGRAM

## 2019-11-06 PROCEDURE — G8420 CALC BMI NORM PARAMETERS: HCPCS | Performed by: STUDENT IN AN ORGANIZED HEALTH CARE EDUCATION/TRAINING PROGRAM

## 2019-11-06 PROCEDURE — 1090F PRES/ABSN URINE INCON ASSESS: CPT | Performed by: STUDENT IN AN ORGANIZED HEALTH CARE EDUCATION/TRAINING PROGRAM

## 2019-11-06 PROCEDURE — G8400 PT W/DXA NO RESULTS DOC: HCPCS | Performed by: STUDENT IN AN ORGANIZED HEALTH CARE EDUCATION/TRAINING PROGRAM

## 2019-11-06 PROCEDURE — G8428 CUR MEDS NOT DOCUMENT: HCPCS | Performed by: STUDENT IN AN ORGANIZED HEALTH CARE EDUCATION/TRAINING PROGRAM

## 2019-11-06 PROCEDURE — 3017F COLORECTAL CA SCREEN DOC REV: CPT | Performed by: STUDENT IN AN ORGANIZED HEALTH CARE EDUCATION/TRAINING PROGRAM

## 2019-11-06 RX ORDER — VALACYCLOVIR HYDROCHLORIDE 1 G/1
1000 TABLET, FILM COATED ORAL 3 TIMES DAILY
Qty: 15 TABLET | Refills: 0 | Status: SHIPPED | OUTPATIENT
Start: 2019-11-06 | End: 2019-11-11

## 2019-11-06 RX ORDER — OLOPATADINE HYDROCHLORIDE 1 MG/ML
1 SOLUTION/ DROPS OPHTHALMIC 2 TIMES DAILY
Qty: 1 BOTTLE | Refills: 0 | Status: SHIPPED | OUTPATIENT
Start: 2019-11-06 | End: 2019-12-06

## 2019-11-06 ASSESSMENT — ENCOUNTER SYMPTOMS
VOMITING: 0
TROUBLE SWALLOWING: 0
ABDOMINAL PAIN: 0
EYE DISCHARGE: 0
SORE THROAT: 0
PHOTOPHOBIA: 0
CONSTIPATION: 0
EYE PAIN: 0
NAUSEA: 0
VOICE CHANGE: 1
SHORTNESS OF BREATH: 0
COUGH: 0
EYE ITCHING: 1
RHINORRHEA: 1

## 2019-11-07 ENCOUNTER — TELEPHONE (OUTPATIENT)
Dept: SPIRITUAL SERVICES | Facility: CLINIC | Age: 74
End: 2019-11-07

## 2019-11-14 LAB
ABSOLUTE BASO #: 0 X10E9/L (ref 0–0.9)
ABSOLUTE EOS #: 0.1 X10E9/L (ref 0–0.4)
ABSOLUTE LYMPH #: 1.2 X10E9/L (ref 1–3.5)
ABSOLUTE MONO #: 0.2 X10E9/L (ref 0–0.9)
ABSOLUTE NEUT #: 3.6 X10E9/L (ref 1.5–6.6)
ANTI-NUCLEAR ANTIBODY (ANA): NEGATIVE
BASOPHILS RELATIVE PERCENT: 1 %
CALCIUM SERPL-MCNC: 9.4 MG/DL (ref 8.5–10.5)
CHOLESTEROL/HDL RATIO: 3.3 (ref 1–5)
CHOLESTEROL: 314 MG/DL (ref 150–200)
EOSINOPHILS RELATIVE PERCENT: 1 %
ESTRADIOL LEVEL: <15 PG/ML
HCT VFR BLD CALC: 40.5 % (ref 35–47)
HDLC SERPL-MCNC: 94 MG/DL
HEMOGLOBIN: 13.8 G/DL (ref 11.7–16.1)
HIGH SENSITIVE C-REACTIVE PROTEIN: 0.1 MG/DL (ref 0–0.74)
LDL CHOLESTEROL CALCULATED: 193 MG/DL
LDL/HDL RATIO: 2.1
LH: 28.4 MIU/ML
LYMPHOCYTE %: 23.9 %
MAGNESIUM: 1.9 MG/DL (ref 1.8–2.6)
MCH RBC QN AUTO: 31 PG (ref 27–35)
MCHC RBC AUTO-ENTMCNC: 34 G/DL (ref 31–36)
MCV RBC AUTO: 91 FL (ref 81–101)
MONOCYTES # BLD: 4.4 %
NEUTROPHILS RELATIVE PERCENT: 69.7 %
PDW BLD-RTO: 13.6 % (ref 11.5–14.7)
PLATELETS: 249 X10E9/L (ref 150–450)
PMV BLD AUTO: 9.1 FL (ref 7–12)
PROGESTERONE LEVEL: 0.3 NG/ML
RBC: 4.45 X10E12/L (ref 3.55–5.2)
RHEUMATOID FACTOR: <10 IU/ML
SEDIMENTATION RATE, ERYTHROCYTE: 23 MM/H (ref 0–30)
T3 TOTAL: 110 NG/DL (ref 87–178)
T4 TOTAL: 7.2 UG/DL (ref 6.1–12.2)
TRIGL SERPL-MCNC: 136 MG/DL (ref 27–150)
TSH SERPL DL<=0.05 MIU/L-ACNC: 1.08 UIU/ML (ref 0.49–4.67)
VLDLC SERPL CALC-MCNC: 27 MG/DL (ref 0–30)
WBC: 5.2 X10E9/L (ref 4–11.8)

## 2019-11-15 LAB
DHEAS (DHEA SULFATE): 231 UG/DL (ref 9–246)
GLIADIN ANTIBODIES IGA: 1.6 U/ML
GLIADIN ANTIBODIES IGG: 1.2 U/ML
HOMOCYSTINE, SERUM: 9 UMOL/L
PARATHYROID HORMONE INTACT: 18 PG/ML (ref 15–65)
THYROID PEROXIDASE ANTIBODY: <1 IU/ML
VITAMIN D 25-HYDROXY: 53 NG/ML

## 2019-11-16 LAB
ALBUMIN SERUM: 4.4 G/DL (ref 3.6–5.1)
SEX HORMONE BINDING GLOBULIN: 110.4 NMOL/L (ref 17.3–125)
TESTOSTERONE FREE: 4.4 PG/ML (ref 0.6–3.8)
TESTOSTERONE, LCMS: 58 NG/DL (ref 5–32)

## 2019-11-18 LAB — DEHYDROEPIANDROSTERONE: 2.2 NG/ML (ref 1.3–9.8)

## 2019-11-20 ENCOUNTER — CLINICAL DOCUMENTATION (OUTPATIENT)
Dept: SPIRITUAL SERVICES | Facility: CLINIC | Age: 74
End: 2019-11-20

## 2019-11-21 ENCOUNTER — TELEPHONE (OUTPATIENT)
Dept: SPIRITUAL SERVICES | Facility: CLINIC | Age: 74
End: 2019-11-21

## 2019-11-26 ENCOUNTER — OFFICE VISIT (OUTPATIENT)
Dept: FAMILY MEDICINE CLINIC | Age: 74
End: 2019-11-26
Payer: MEDICARE

## 2019-11-26 VITALS
SYSTOLIC BLOOD PRESSURE: 130 MMHG | TEMPERATURE: 97.7 F | OXYGEN SATURATION: 98 % | RESPIRATION RATE: 10 BRPM | HEIGHT: 65 IN | BODY MASS INDEX: 20.53 KG/M2 | HEART RATE: 73 BPM | DIASTOLIC BLOOD PRESSURE: 72 MMHG | WEIGHT: 123.2 LBS

## 2019-11-26 DIAGNOSIS — E78.2 MIXED HYPERLIPIDEMIA: ICD-10-CM

## 2019-11-26 DIAGNOSIS — R73.09 LOW GLUCOSE LEVEL: ICD-10-CM

## 2019-11-26 DIAGNOSIS — G89.29 CHRONIC RIGHT SHOULDER PAIN: ICD-10-CM

## 2019-11-26 DIAGNOSIS — R53.83 OTHER FATIGUE: ICD-10-CM

## 2019-11-26 DIAGNOSIS — E55.9 VITAMIN D DEFICIENCY: ICD-10-CM

## 2019-11-26 DIAGNOSIS — J30.1 SEASONAL ALLERGIC RHINITIS DUE TO POLLEN: ICD-10-CM

## 2019-11-26 DIAGNOSIS — H04.123 DRY EYES: Primary | ICD-10-CM

## 2019-11-26 DIAGNOSIS — L98.9 SKIN LESION: ICD-10-CM

## 2019-11-26 DIAGNOSIS — K90.89 OTHER INTESTINAL MALABSORPTION: ICD-10-CM

## 2019-11-26 DIAGNOSIS — M25.511 CHRONIC RIGHT SHOULDER PAIN: ICD-10-CM

## 2019-11-26 DIAGNOSIS — Z13.29 THYROID DISORDER SCREEN: ICD-10-CM

## 2019-11-26 DIAGNOSIS — C50.011 MALIGNANT NEOPLASM OF NIPPLE OF RIGHT BREAST IN FEMALE, UNSPECIFIED ESTROGEN RECEPTOR STATUS (HCC): ICD-10-CM

## 2019-11-26 PROCEDURE — G8482 FLU IMMUNIZE ORDER/ADMIN: HCPCS | Performed by: FAMILY MEDICINE

## 2019-11-26 PROCEDURE — G8427 DOCREV CUR MEDS BY ELIG CLIN: HCPCS | Performed by: FAMILY MEDICINE

## 2019-11-26 PROCEDURE — G8420 CALC BMI NORM PARAMETERS: HCPCS | Performed by: FAMILY MEDICINE

## 2019-11-26 PROCEDURE — 1123F ACP DISCUSS/DSCN MKR DOCD: CPT | Performed by: FAMILY MEDICINE

## 2019-11-26 PROCEDURE — 3017F COLORECTAL CA SCREEN DOC REV: CPT | Performed by: FAMILY MEDICINE

## 2019-11-26 PROCEDURE — 1090F PRES/ABSN URINE INCON ASSESS: CPT | Performed by: FAMILY MEDICINE

## 2019-11-26 PROCEDURE — G8400 PT W/DXA NO RESULTS DOC: HCPCS | Performed by: FAMILY MEDICINE

## 2019-11-26 PROCEDURE — 4040F PNEUMOC VAC/ADMIN/RCVD: CPT | Performed by: FAMILY MEDICINE

## 2019-11-26 PROCEDURE — 1036F TOBACCO NON-USER: CPT | Performed by: FAMILY MEDICINE

## 2019-11-26 PROCEDURE — 99214 OFFICE O/P EST MOD 30 MIN: CPT | Performed by: FAMILY MEDICINE

## 2019-11-26 RX ORDER — LETROZOLE 2.5 MG/1
1 TABLET, FILM COATED ORAL DAILY
Refills: 3 | COMMUNITY
Start: 2019-09-27

## 2019-12-04 ENCOUNTER — CLINICAL DOCUMENTATION (OUTPATIENT)
Dept: SPIRITUAL SERVICES | Facility: CLINIC | Age: 74
End: 2019-12-04

## 2019-12-18 ENCOUNTER — TELEPHONE (OUTPATIENT)
Dept: FAMILY MEDICINE CLINIC | Age: 74
End: 2019-12-18

## 2020-02-04 ENCOUNTER — OFFICE VISIT (OUTPATIENT)
Dept: FAMILY MEDICINE CLINIC | Age: 75
End: 2020-02-04
Payer: MEDICARE

## 2020-02-04 VITALS
OXYGEN SATURATION: 97 % | DIASTOLIC BLOOD PRESSURE: 70 MMHG | TEMPERATURE: 97.8 F | HEIGHT: 65 IN | HEART RATE: 80 BPM | WEIGHT: 119 LBS | SYSTOLIC BLOOD PRESSURE: 136 MMHG | RESPIRATION RATE: 16 BRPM | BODY MASS INDEX: 19.83 KG/M2

## 2020-02-04 LAB
BILIRUBIN URINE: NEGATIVE
BLOOD URINE, POC: ABNORMAL
CHARACTER, URINE: CLEAR
COLOR, URINE: YELLOW
GLUCOSE URINE: NEGATIVE MG/DL
KETONES, URINE: NEGATIVE
LEUKOCYTE CLUMPS, URINE: ABNORMAL
NITRITE, URINE: NEGATIVE
PH, URINE: 5.5 (ref 5–9)
PROTEIN, URINE: NEGATIVE MG/DL
SPECIFIC GRAVITY, URINE: 1.01 (ref 1–1.03)
UROBILINOGEN, URINE: 0.2 EU/DL (ref 0–1)

## 2020-02-04 PROCEDURE — 1123F ACP DISCUSS/DSCN MKR DOCD: CPT | Performed by: NURSE PRACTITIONER

## 2020-02-04 PROCEDURE — 3017F COLORECTAL CA SCREEN DOC REV: CPT | Performed by: NURSE PRACTITIONER

## 2020-02-04 PROCEDURE — 1090F PRES/ABSN URINE INCON ASSESS: CPT | Performed by: NURSE PRACTITIONER

## 2020-02-04 PROCEDURE — G8482 FLU IMMUNIZE ORDER/ADMIN: HCPCS | Performed by: NURSE PRACTITIONER

## 2020-02-04 PROCEDURE — G8510 SCR DEP NEG, NO PLAN REQD: HCPCS | Performed by: NURSE PRACTITIONER

## 2020-02-04 PROCEDURE — 4040F PNEUMOC VAC/ADMIN/RCVD: CPT | Performed by: NURSE PRACTITIONER

## 2020-02-04 PROCEDURE — G8427 DOCREV CUR MEDS BY ELIG CLIN: HCPCS | Performed by: NURSE PRACTITIONER

## 2020-02-04 PROCEDURE — G8420 CALC BMI NORM PARAMETERS: HCPCS | Performed by: NURSE PRACTITIONER

## 2020-02-04 PROCEDURE — G8400 PT W/DXA NO RESULTS DOC: HCPCS | Performed by: NURSE PRACTITIONER

## 2020-02-04 PROCEDURE — 1036F TOBACCO NON-USER: CPT | Performed by: NURSE PRACTITIONER

## 2020-02-04 PROCEDURE — 99214 OFFICE O/P EST MOD 30 MIN: CPT | Performed by: NURSE PRACTITIONER

## 2020-02-04 ASSESSMENT — ENCOUNTER SYMPTOMS
ABDOMINAL DISTENTION: 0
ANAL BLEEDING: 0
RHINORRHEA: 0
CONSTIPATION: 0
COLOR CHANGE: 0
NAUSEA: 0
SORE THROAT: 0
ABDOMINAL PAIN: 0
DIARRHEA: 0
EYE DISCHARGE: 0
BLOOD IN STOOL: 0
EYE REDNESS: 0
COUGH: 0
SHORTNESS OF BREATH: 0

## 2020-02-04 ASSESSMENT — PATIENT HEALTH QUESTIONNAIRE - PHQ9
2. FEELING DOWN, DEPRESSED OR HOPELESS: 0
SUM OF ALL RESPONSES TO PHQ QUESTIONS 1-9: 0
1. LITTLE INTEREST OR PLEASURE IN DOING THINGS: 0
SUM OF ALL RESPONSES TO PHQ9 QUESTIONS 1 & 2: 0
SUM OF ALL RESPONSES TO PHQ QUESTIONS 1-9: 0

## 2020-02-04 NOTE — PROGRESS NOTES
[address] [facility]  [phone] (phone)  [phone] (fax)    Visit Date: 2/4/2020    Daniel Pedro is a 69 y.o. female who presents today for:  Chief Complaint   Patient presents with    3 Month Follow-Up    Urinary Frequency     comes and goes started about 6 months ago    Shoulder Pain     bilateral shoulder pain started about 4-5 months ago     HPI:     Urinary frequency off and on for the past 6 months - does not wake her up at night. She drinks green tea and water - she will have frequency and if she increases her water intake the frequency improves - drinking more during the day now - no irritation or pain. No blood in the urine. Has a history of hematuria - went to a specialist in the past years ago and was told she was fine. Bilateral shoulder pain - thinks it is arthritis - back to lifting light weights again - goes to chiropractor twice monthly. 6th and 7th vertebra are \"compressed\"    Had xray of right shoulder in September:  Narrative   PROCEDURE: XR SHOULDER RIGHT (MIN 2 VIEWS)       CLINICAL INFORMATION: Chronic right shoulder pain; history of breast cancer.       COMPARISON: No prior study.       TECHNIQUE: AP, Grashey, axillary and transscapular Y views performed.           FINDINGS:    POSTOPERATIVE CHANGES: None.       ALIGNMENT: Anatomic.       MINERALIZATION:    1. The bony structures are osteopenic.       FRACTURE: None.       ACROMIOCLAVICULAR ARTICULATION:    1. Mild hypertrophic degenerative changes along the acromial margin of the acromioclavicular articulation and a 0.3 cm chronic ossicle within the soft tissues along the anterior aspect of the acromioclavicular articulation.       GLENOHUMERAL ARTICULATION:    1.  Mild degenerative changes with small marginal osteophytes along the inferior aspect of the humeral head and glenoid.       ACROMIOHUMERAL INTERVAL: Unremarkable.       RIBS:  No rib abnormality is seen within the imaged portions of the chest.       OTHER: 1. Subchondral sclerosis and cystic change at the greater tuberosity which may be degenerative or related to impingement. 2. There is a stable small nodule at the right lung apex which is unchanged compared to a chest radiograph dated 5/20/2015.               Impression   1. There is no acute process. 2. Chronic findings as described in the body the report.          HPI  Health Maintenance   Topic Date Due    DTaP/Tdap/Td vaccine (1 - Tdap) 02/05/1956    Shingles Vaccine (1 of 2) 02/05/1996    Pneumococcal 65+ years Vaccine (2 of 2 - PPSV23) 11/05/2019    Breast cancer screen  06/03/2020    Annual Wellness Visit (AWV)  11/04/2020    Lipid screen  11/13/2024    Colon cancer screen colonoscopy  07/19/2029    DEXA (modify frequency per FRAX score)  Completed    Flu vaccine  Completed    Hepatitis C screen  Completed     Past Medical History:   Diagnosis Date    Cancer [facility])       Past Surgical History:   Procedure Laterality Date    BREAST SURGERY      FINGER TRIGGER RELEASE Right 2017     Family History   Problem Relation Age of Onset    Diabetes Mother     Cancer Mother         colon    High Blood Pressure Mother     No Known Problems Father     Breast Cancer Sister     High Blood Pressure Brother     No Known Problems Brother     No Known Problems Brother      Social History     Tobacco Use    Smoking status: Never Smoker    Smokeless tobacco: Never Used   Substance Use Topics    Alcohol use: No      Current Outpatient Medications   Medication Sig Dispense Refill    letrozole (FEMARA) 2.5 MG tablet Take 1 tablet by mouth daily  3    NONFORMULARY Take 2 capsules by mouth every evening Magtein 1 at supper and 1 at bedtime      B Complex-Folic Acid (K-638 BALANCED TR PO) Take 1 tablet by mouth 3 times daily (before meals) [address] [facility] [employer] Potassium 99 MG TABS Take 1 capsule by mouth every other day       diphenhydrAMINE-PE-APAP (ALLERGY RELIEF PLUS SINUS) 25-5-325 MG TABS Take by mouth as needed      vitamin D (CHOLECALCIFEROL) 5000 units CAPS capsule Take 1,000 Units by mouth every other day       Cinnamon 500 MG CAPS Take 500 mg by mouth 3 times daily       Ginger, Zingiber officinalis, (GINGER ROOT) 500 MG CAPS Take 1 capsule by mouth daily      Turmeric Curcumin 500 MG CAPS Take 1 capsule by mouth daily      melatonin 3 MG TABS tablet Take 5 mg by mouth nightly as needed      Garlic 150 MG CAPS Take 1 capsule by mouth 2 times daily      Calcium Carbonate-Vit D-Min (CALCIUM 1200 PO) Take 1 tablet by mouth 2 times daily (before meals)       Omega-3 Fatty Acids (FISH OIL) 1200 MG CAPS Take 2 capsules by mouth 3 times daily (before meals) [facility] grade when out      vitamin E 400 UNIT capsule Take 400 Units by mouth daily      Cyanocobalamin (VITAMIN B-12) 3000 MCG SUBL Place 2 tablets under the tongue daily       Biotin 2500 MCG CAPS Take 1 tablet by mouth every other day       NONFORMULARY Take 1 capsule by mouth daily as needed Orega Resp usually used in October      NONFORMULARY Take 1 capsule by mouth 2 times daily as needed Respiration Blend SP-3 usually used in October      Olive Leaf Extract 250 MG CAPS Take 1 capsule by mouth 2 times daily      Cetirizine HCl (ZYRTEC ALLERGY) 10 MG CAPS Take by mouth nightly       Loratadine (CLARITIN) 10 MG CAPS Take 10 mg by mouth as needed       vitamin B-6 (PYRIDOXINE) 50 MG tablet Take 50 mg by mouth daily        No current facility-administered medications for this visit. Allergies   Allergen Reactions    Gluten Meal Other (See Comments)     Gluten,fruit, and sugar causes blisters of mouth    Orange Oil Other (See Comments)     Any acidic fruit    Pcn [Penicillins] Other (See Comments)     Unknown  Childhood reaction. Subjective:    Review of Systems   Constitutional: Negative for chills, fatigue and fever. HENT: Negative for congestion, ear pain, postnasal drip, rhinorrhea and sore throat. Musculoskeletal:         General: No tenderness or deformity. Right shoulder: She exhibits decreased range of motion and pain. Left shoulder: She exhibits decreased range of motion and pain. Skin:     Coloration: Skin is not pale. Findings: No erythema or rash (On exposed areas). Neurological:      Mental Status: She is alert. Gait: Gait normal.   Psychiatric:         Speech: Speech normal.         Behavior: Behavior normal.         Thought Content: Thought content normal.         Judgment: Judgment normal.       Lab Results   Component Value Date    WBC 5.2 11/13/2019    HGB 13.8 11/13/2019    HCT 40.5 11/13/2019     11/13/2019    CHOL 314 (H) 11/13/2019    TRIG 136 11/13/2019    HDL 94 11/13/2019    LDLCALC 193 (H) 11/13/2019    AST 21 08/21/2019     08/21/2019    K 5.4 (H) 08/21/2019     08/21/2019    CREATININE 0.90 08/21/2019    BUN 25 08/21/2019    CO2 28 08/21/2019    TSH 1.08 11/13/2019    LABGLOM 83 (A) 04/08/2016    MG 1.9 11/13/2019    CALCIUM 9.4 11/13/2019    VITD25 53 11/13/2019     Assessment:       Diagnosis Orders   1. Chronic left shoulder pain  XR SHOULDER LEFT (MIN 2 VIEWS)    [facility]   2. Arthritis pain of shoulder  [facility]   3. Chronic right shoulder pain  [facility]   4. Urinary frequency  Urine Culture       Plan: Will order the xray of the left shoulder  Will refer to therapy for the shoulders  Will culture the urine - may send antibiotic if it grows anything  Back in 3 months - sooner as needed    Return in about 6 months (around 8/4/2020), or if symptoms worsen or fail to improve.     Orders Placed:  Orders Placed This Encounter   Procedures    Urine Culture    XR SHOULDER LEFT (MIN 2 VIEWS)    [facility]    POCT Urinalysis No Micro (Auto)     Medications Prescribed:  No orders of the defined types were placed in this

## 2020-02-04 NOTE — PATIENT INSTRUCTIONS
Plan:   Will order the xray of the left shoulder  Will refer to therapy for the shoulders  Will culture the urine - may send antibiotic if it grows anything  Back in 3 months - sooner as needed      Patient Education        Arthritis: Care Instructions  Your Care Instructions  Arthritis, also called osteoarthritis, is a breakdown of the cartilage that cushions your joints. When the cartilage wears down, your bones rub against each other. This causes pain and stiffness. Many people have some arthritis as they age. Arthritis most often affects the joints of the spine, hands, hips, knees, or feet. You can take simple measures to protect your joints, ease your pain, and help you stay active. Follow-up care is a key part of your treatment and safety. Be sure to make and go to all appointments, and call your doctor if you are having problems. It's also a good idea to know your test results and keep a list of the medicines you take. How can you care for yourself at home? · Stay at a healthy weight. Being overweight puts extra strain on your joints. · Talk to your doctor or physical therapist about exercises that will help ease joint pain. ? Stretch. You may enjoy gentle forms of yoga to help keep your joints and muscles flexible. ? Walk instead of jog. Other types of exercise that are less stressful on the joints include riding a bicycle, swimming, ronnie chi, or water exercise. ? Lift weights. Strong muscles help reduce stress on your joints. Stronger thigh muscles, for example, take some of the stress off of the knees and hips. Learn the right way to lift weights so you do not make joint pain worse. · Take your medicines exactly as prescribed. Call your doctor if you think you are having a problem with your medicine. · Take pain medicines exactly as directed. ? If the doctor gave you a prescription medicine for pain, take it as prescribed.   ? If you are not taking a prescription pain medicine, ask your doctor if you can take an over-the-counter medicine. · Use a cane, crutch, walker, or another device if you need help to get around. These can help rest your joints. You also can use other things to make life easier, such as a higher toilet seat and padded handles on kitchen utensils. · Do not sit in low chairs, which can make it hard to get up. · Put heat or cold on your sore joints as needed. Use whichever helps you most. You also can take turns with hot and cold packs. ? Apply heat 2 or 3 times a day for 20 to 30 minutes--using a heating pad, hot shower, or hot pack--to relieve pain and stiffness. ? Put ice or a cold pack on your sore joint for 10 to 20 minutes at a time. Put a thin cloth between the ice and your skin. When should you call for help? Call your doctor now or seek immediate medical care if:    · You have sudden swelling, warmth, or pain in any joint.     · You have joint pain and a fever or rash.     · You have such bad pain that you cannot use a joint.    Watch closely for changes in your health, and be sure to contact your doctor if:    · You have mild joint symptoms that continue even with more than 6 weeks of care at home.     · You have stomach pain or other problems with your medicine. Where can you learn more? Go to https://Interactive Mobile Advertising.2CRisk. org and sign in to your [employer] account. Enter N664 in the CoverHound box to learn more about \"Arthritis: Care Instructions. \"     If you do not have an account, please click on the \"Sign Up Now\" link. Current as of: April 1, 2019  Content Version: 12.3  © 2717-5211 Info. Care instructions adapted under license by [facility] ([facility]). If you have questions about a medical condition or this instruction, always ask your healthcare professional. [address] any warranty or liability for your use of this information.          Patient Education        Chronic Pain: Care Instructions  Your Care can make you tired and drain your energy. Talk with your doctor if you have trouble sleeping because of pain. · Think positive. Your thoughts can affect your pain level. Do things that you enjoy to distract yourself when you have pain instead of focusing on the pain. See a movie, read a book, listen to music, or spend time with a friend. · If you think you are depressed, talk to your doctor about treatment. · Keep a daily pain diary. Record how your moods, thoughts, sleep patterns, activities, and medicine affect your pain. You may find that your pain is worse during or after certain activities or when you are feeling a certain emotion. Having a record of your pain can help you and your doctor find the best ways to treat your pain. · Take pain medicines exactly as directed. ? If the doctor gave you a prescription medicine for pain, take it as prescribed. ? If you are not taking a prescription pain medicine, ask your doctor if you can take an over-the-counter medicine. Reducing constipation caused by pain medicine  · Include fruits, vegetables, beans, and whole grains in your diet each day. These foods are high in fiber. · Drink plenty of fluids, enough so that your urine is light yellow or clear like water. If you have kidney, heart, or liver disease and have to limit fluids, talk with your doctor before you increase the amount of fluids you drink. · If your doctor recommends it, get more exercise. Walking is a good choice. Bit by bit, increase the amount you walk every day. Try for at least 30 minutes on most days of the week. · Schedule time each day for a bowel movement. A daily routine may help. Take your time and do not strain when having a bowel movement. When should you call for help? Call your doctor now or seek immediate medical care if:    · Your pain gets worse or is out of control.     · You feel down or blue, or you do not enjoy things like you once did.  You may be depressed, which is common in people with chronic pain. Depression can be treated.     · You have vomiting or cramps for more than 2 hours.    Watch closely for changes in your health, and be sure to contact your doctor if:    · You cannot sleep because of pain.     · You are very worried or anxious about your pain.     · You have trouble taking your pain medicine.     · You have any concerns about your pain medicine.     · You have trouble with bowel movements, such as:  ? No bowel movement in 3 days. ? Blood in the anal area, in your stool, or on the toilet paper. ? Diarrhea for more than 24 hours. Where can you learn more? Go to https://Glacier BaypePassHat.O[employer] Media Solutions. org and sign in to your [employer] account. Enter N004 in the [employer] box to learn more about \"Chronic Pain: Care Instructions. \"     If you do not have an account, please click on the \"Sign Up Now\" link. Current as of: March 28, 2019  Content Version: 12.3  © 1240-4246 [employer]. Care instructions adapted under license by [facility] ([facility]). If you have questions about a medical condition or this instruction, always ask your healthcare professional. [place] [address] any warranty or liability for your use of this information. Patient Education        Musculoskeletal Pain: Care Instructions  Your Care Instructions    Different problems with the bones, muscles, nerves, ligaments, and tendons in the body can cause pain. One or more areas of your body may ache or burn. Or they may feel tired, stiff, or sore. The medical term for this type of pain is musculoskeletal pain. It can have many different causes. Sometimes the pain is caused by an injury such as a strain or sprain. Or you might have pain from using one part of your body in the same way over and over again. This is called overuse. In some cases, the cause of the pain is another health problem such as arthritis or fibromyalgia.   The doctor will examine you and ask you are good exercises that are gentle on the joints. · Reach and stay at a healthy weight. If you need to lose or maintain weight, regular exercise and a healthy diet will help. Extra weight can strain the joints, especially the knees and hips, and make the pain worse. Losing even a few pounds may help. · Take pain medicines exactly as directed. ? If the doctor gave you a prescription medicine for pain, take it as prescribed. ? If you are not taking a prescription pain medicine, ask your doctor if you can take an over-the-counter medicine. When should you call for help? Call your doctor now or seek immediate medical care if:    · The pain is so bad that you cannot use the joint.     · You have sudden back pain with weakness in your legs or loss of bowel or bladder control.     · Your stools are black and tarlike or have streaks of blood.     · You have severe pain and swelling in more than one joint.    Watch closely for changes in your health, and be sure to contact your doctor if:    · You have side effects from the medicines, like belly pain, ongoing heartburn, or nausea.     · Joint pain continues for more than 6 weeks, and home treatment is not helping. Where can you learn more? Go to https://Omnigy.CardMunch. org and sign in to your eyeQ account. Enter Y235 in the [employer] box to learn more about \"Osteoarthritis: Care Instructions. \"     If you do not have an account, please click on the \"Sign Up Now\" link. Current as of: April 1, 2019  Content Version: 12.3  © 7485-7086 Healthwise, Incorporated. Care instructions adapted under license by [facility] ([facility]). If you have questions about a medical condition or this instruction, always ask your healthcare professional. [place] [address] any warranty or liability for your use of this information.          Patient Education        Shoulder Stretches: Exercises  Introduction  Here are some examples of exercises for you to try. The exercises may be suggested for a condition or for rehabilitation. Start each exercise slowly. Ease off the exercises if you start to have pain. You will be told when to start these exercises and which ones will work best for you. How to do the exercises  Shoulder stretch   1.  a doorway and place one arm against the door frame. Your elbow should be a little higher than your shoulder. 2. Relax your shoulders as you lean forward, allowing your chest and shoulder muscles to stretch. You can also turn your body slightly away from your arm to stretch the muscles even more. 3. Hold for 15 to 30 seconds. 4. Repeat 2 to 4 times with each arm. Shoulder and chest stretch   1. Shoulder and chest stretch  2. While sitting, relax your upper body so you slump slightly in your chair. 3. As you breathe in, straighten your back and open your arms out to the sides. 4. Gently pull your shoulder blades back and downward. 5. Hold for 15 to 30 seconds as your breathe normally. 6. Repeat 2 to 4 times. Overhead stretch   1. Reach up over your head with both arms. 2. Hold for 15 to 30 seconds. 3. Repeat 2 to 4 times. Follow-up care is a key part of your treatment and safety. Be sure to make and go to all appointments, and call your doctor if you are having problems. It's also a good idea to know your test results and keep a list of the medicines you take. Where can you learn more? Go to https://VipshoppeshermanBrentwood Media Group.Eye-Q. org and sign in to your [employer] account. Enter S254 in the [employer] box to learn more about \"Shoulder Stretches: Exercises. \"     If you do not have an account, please click on the \"Sign Up Now\" link. Current as of: June 26, 2019  Content Version: 12.3  © 1873-9812 Healthwise, Incorporated. Care instructions adapted under license by [facility] ([facility]).  If you have questions about a medical condition or this instruction, always ask your healthcare professional.

## 2020-02-06 ENCOUNTER — TELEPHONE (OUTPATIENT)
Dept: FAMILY MEDICINE CLINIC | Age: 75
End: 2020-02-06

## 2020-02-06 LAB
ORGANISM: ABNORMAL
URINE CULTURE, ROUTINE: ABNORMAL

## 2020-02-06 NOTE — TELEPHONE ENCOUNTER
----- Message from MATTHEW Dasilva CNP sent at 2/6/2020  9:47 AM EST -----  Urine shows mixed growth - no specific bacteria isolated.

## 2020-02-10 ENCOUNTER — HOSPITAL ENCOUNTER (OUTPATIENT)
Age: 75
Discharge: HOME | End: 2020-02-10
Payer: MEDICARE

## 2020-02-10 ENCOUNTER — HOSPITAL ENCOUNTER (OUTPATIENT)
Dept: GENERAL RADIOLOGY | Age: 75
Discharge: HOME | End: 2020-02-10
Payer: MEDICARE

## 2020-02-10 PROCEDURE — 73030 X-RAY EXAM OF SHOULDER: CPT

## 2020-02-11 ENCOUNTER — TELEPHONE (OUTPATIENT)
Dept: FAMILY MEDICINE CLINIC | Age: 75
End: 2020-02-11

## 2020-02-11 ENCOUNTER — HOSPITAL ENCOUNTER (OUTPATIENT)
Dept: OCCUPATIONAL THERAPY | Age: 75
Setting detail: THERAPIES SERIES
Discharge: HOME | End: 2020-02-11
Payer: MEDICARE

## 2020-02-11 PROCEDURE — 97165 OT EVAL LOW COMPLEX 30 MIN: CPT

## 2020-02-11 PROCEDURE — 97110 THERAPEUTIC EXERCISES: CPT

## 2020-02-11 ASSESSMENT — PAIN DESCRIPTION - LOCATION: LOCATION: SHOULDER

## 2020-02-11 ASSESSMENT — PAIN SCALES - GENERAL: PAINLEVEL_OUTOF10: 3

## 2020-02-11 ASSESSMENT — PAIN DESCRIPTION - ORIENTATION: ORIENTATION: RIGHT;LEFT

## 2020-02-11 ASSESSMENT — PAIN DESCRIPTION - PAIN TYPE: TYPE: CHRONIC PAIN

## 2020-02-11 NOTE — TELEPHONE ENCOUNTER
----- Message from MATTHEW Munoz CNP sent at 2/11/2020 10:17 AM EST -----  Mild arthritis in the shoulder otherwise normal

## 2020-02-11 NOTE — PROGRESS NOTES
** PLEASE SIGN, DATE AND TIME CERTIFICATION BELOW AND RETURN TO [facility] OUTPATIENT REHABILITATION (FAX #: [phone]). ATTEST/CO-SIGN IF ACCESSING VIA [employer]. THANK YOU.**    I certify that I have examined the patient below and determined that Physical Medicine and Rehabilitation service is necessary and that I approve the established plan of care for up to 90 days or as specifically noted. Attestation, signature or co-signature of physician indicates approval of certification requirements.    ________________________ ____________ __________  Physician Signature   Date   Time        Time In: 1000  Time Out: 1050  Minutes: 50  Timed Code Treatment Minutes: 15 Minutes           UEFS score= 72, modifier= CI    Date: 2020  Patient Name: Solomon Camarillo        CSN: [id number]     : 1945  (72 y.o.)  Gender: female   Referring Practitioner: Quinn Montero CNP  Diagnosis: Chronic left shoulder pain, Chronic right shoulder pain, arthritis pain of shoulder  Treatment Diagnosis: bilateral shoulder pain  Additional Pertinent Hx: Pt. states she has been having pain in both shoulders for the past year. Pt. states she used to weight lift and has gotten back into this and feels that the shoulder pain is because her back muscles are weak. Pt. states she also does a lot of physical work. Pt. did have an xray of right shoulder 19 which showed arthritis and spurring in [address] joint, had left shoulder xray 20 which showed mild degenerative changes of AC joint. Pt. states right shoulder hurts worse than left. Pt. has a history of left side breast cancer with lumpectomy. Solomon Camarillo  has a past medical history of Cancer ([address] Solomon Camarillo  has a past surgical history that includes Breast surgery and Finger trigger release (Right, ).     See Medical History Questionnaire for information related home health aide

## 2020-02-17 ENCOUNTER — HOSPITAL ENCOUNTER (OUTPATIENT)
Dept: OCCUPATIONAL THERAPY | Age: 75
Setting detail: THERAPIES SERIES
End: 2020-02-17
Payer: MEDICARE

## 2020-02-19 ENCOUNTER — HOSPITAL ENCOUNTER (OUTPATIENT)
Dept: OCCUPATIONAL THERAPY | Age: 75
Setting detail: THERAPIES SERIES
Discharge: HOME | End: 2020-02-19
Payer: MEDICARE

## 2020-02-19 PROCEDURE — 97110 THERAPEUTIC EXERCISES: CPT

## 2020-02-19 NOTE — PROGRESS NOTES
Added supine scapular punch and alphabet to HEP with illustrations given. Discussed slowly adding exercises to HEP as technique and strength improves. Plan:  Plan Comment: Continue per established POC.                     EULOGIO [place] MEZA/L #[id number]

## 2020-02-24 ENCOUNTER — HOSPITAL ENCOUNTER (OUTPATIENT)
Dept: OCCUPATIONAL THERAPY | Age: 75
Setting detail: THERAPIES SERIES
Discharge: HOME | End: 2020-02-24
Payer: MEDICARE

## 2020-02-24 PROCEDURE — 97110 THERAPEUTIC EXERCISES: CPT

## 2020-02-26 ENCOUNTER — HOSPITAL ENCOUNTER (OUTPATIENT)
Dept: OCCUPATIONAL THERAPY | Age: 75
Setting detail: THERAPIES SERIES
Discharge: HOME | End: 2020-02-26
Payer: MEDICARE

## 2020-02-26 PROCEDURE — 97110 THERAPEUTIC EXERCISES: CPT

## 2020-02-26 NOTE — PROGRESS NOTES
[address] [place] [address]  OUTPATIENT OCCUPATIONAL THERAPY  Daily Note  [address]    Time In: 6305  Time Out: [address]  Minutes: 40  Timed Code Treatment Minutes: 40 Minutes                Date: 2020  Patient Name: Nick Morrow        CSN: [id number]   : 1945  (77 y.o.)  Gender: female   Referring Practitioner: Vasyl Montero CNP  Diagnosis: Chronic left shoulder pain, Chronic right shoulder pain, arthritis pain of shoulder          General:  OT Visit Information  Onset Date: 20  OT Insurance Information: Medicare  Total # of Visits to Date: 4  Certification Period Expiration Date: 20  Progress Note Counter: 4/10 for PN  Comments: No follow is scheduled for shoulders       Restrictions/Precautions:  Restrictions/Precautions: General Precautions              Subjective:  Subjective: Patient reports stretches are helping. Pain:  Patient Currently in Pain: No(No pain at rest today. )       Objective:     Upper Extremity Function  UE Stretching: wall slides, corner stretch, pulley at end for cooldown. UE Strengthing: reverse corner pushup 2 x 10, Brugger's on wall 2 x 10, supine on skinny bolster 2# shoulder flexion, scapular punches, fly. Prone I, T, Y and W.  biodex 60 speed 4 min bwd. Moist Heat  Number Minutes Moist Heat: MHP to bilateral shoulders while therapist stretched into ER and pec minor stretch to the right x10 minutes at start of session. Activity Tolerance: Additional Comments: Patient tolerated treatment well. Assessment:  Assessment: Patient is progressing towards goals. Patient Education:  Patient Education: introduced variations of exs. in the clinic            Plan:  Plan Comment: Continue per established POC.                     Prashanth Hurst OTR/L, [place] #[id number]

## 2020-03-02 ENCOUNTER — HOSPITAL ENCOUNTER (OUTPATIENT)
Dept: OCCUPATIONAL THERAPY | Age: 75
Setting detail: THERAPIES SERIES
Discharge: HOME | End: 2020-03-02
Payer: MEDICARE

## 2020-03-02 PROCEDURE — 97110 THERAPEUTIC EXERCISES: CPT

## 2020-03-04 ENCOUNTER — HOSPITAL ENCOUNTER (OUTPATIENT)
Dept: OCCUPATIONAL THERAPY | Age: 75
Setting detail: THERAPIES SERIES
Discharge: HOME | End: 2020-03-04
Payer: MEDICARE

## 2020-03-04 PROCEDURE — 97110 THERAPEUTIC EXERCISES: CPT

## 2020-03-04 ASSESSMENT — PAIN SCALES - GENERAL: PAINLEVEL_OUTOF10: 1

## 2020-03-09 ENCOUNTER — HOSPITAL ENCOUNTER (OUTPATIENT)
Dept: OCCUPATIONAL THERAPY | Age: 75
Setting detail: THERAPIES SERIES
Discharge: HOME | End: 2020-03-09
Payer: MEDICARE

## 2020-03-09 PROCEDURE — 97110 THERAPEUTIC EXERCISES: CPT

## 2020-03-09 NOTE — DISCHARGE SUMMARY
[facility] THERAPY  Discharge Note  [address]    Time In: 0845  Time Out: 0900  Minutes: 15  Timed Code Treatment Minutes: 15 Minutes           UEFS score = 77. Date: 3/9/2020  Patient Name: Saul Polo        CSN: [id number]   : 1945  (77 y.o.)  Gender: female   Referring Practitioner: Anabela Montero CNP  Diagnosis: Chronic left shoulder pain, Chronic right shoulder pain, arthritis pain of shoulder          General:  OT Visit Information  Onset Date: 20  OT Insurance Information: Medicare  Total # of Visits to Date: 7  Certification Period Expiration Date: 20  Progress Note Counter: 7/10 for PN  Comments: No follow is scheduled for shoulders       Restrictions/Precautions:  Restrictions/Precautions: General Precautions              Subjective:  Subjective: Jonathan Tanner reports she is able to lift trash bags now and mop without pain or difficulty. Pain:  Patient Currently in Pain: Yes(No pain at rest today. )       Objective:     Upper Extremity Function  UE AROM: goals addressed for PN. Patient demonstrating ROM WNL with mild tightness IR R shoulder. UE Stretching: pec stretching over bolster with improvements in R IR noted. UE Strengthing: Reviewed HEP - Reverse corner pushup 2 x 10 cues to avoid compensation L shoulder, Brugger's on wall x20, supine on skinny bolster 2# shoulder flexion, scapular punches, fly x20 reps each. Prone I, T, Y and W x15 reps each. biodex 60 speed 4 min bwd. Seated press up 5 reps with a 5 second hold. D2 flexion 10 reps with orange band. Activity Tolerance: Additional Comments: Patient tolerated treatment well. Assessment:  Assessment: Jonathan Tanner reports good improvements in ROM and strength. Is I with HEP to continue after discharge. See goals for current objective measures.     Patient Education:  OT Education: Plan of Care, Home

## 2020-06-05 ENCOUNTER — NURSE ONLY (OUTPATIENT)
Dept: LAB | Age: 75
End: 2020-06-05

## 2020-06-05 LAB
AVERAGE GLUCOSE: 102 MG/DL (ref 70–126)
BASOPHILS # BLD: 1.2 %
BASOPHILS ABSOLUTE: 0 THOU/MM3 (ref 0–0.1)
CALCIUM SERPL-MCNC: 9.6 MG/DL (ref 8.5–10.5)
CHOLESTEROL, TOTAL: 286 MG/DL (ref 100–199)
EOSINOPHIL # BLD: 1.2 %
EOSINOPHILS ABSOLUTE: 0 THOU/MM3 (ref 0–0.4)
ERYTHROCYTE [DISTWIDTH] IN BLOOD BY AUTOMATED COUNT: 13.5 % (ref 11.5–14.5)
ERYTHROCYTE [DISTWIDTH] IN BLOOD BY AUTOMATED COUNT: 46.6 FL (ref 35–45)
ESTRADIOL LEVEL: < 5 PG/ML
FOLLICLE STIMULATING HORMONE: 66.4 MIU/ML (ref 16–160)
HBA1C MFR BLD: 5.4 % (ref 4.4–6.4)
HCT VFR BLD CALC: 39.8 % (ref 37–47)
HDLC SERPL-MCNC: 102 MG/DL
HEMOGLOBIN: 12.8 GM/DL (ref 12–16)
IMMATURE GRANS (ABS): 0.01 THOU/MM3 (ref 0–0.07)
IMMATURE GRANULOCYTES: 0.2 %
LDL CHOLESTEROL CALCULATED: 166 MG/DL
LUTEINIZING HORMONE: 33.2 MIU/ML (ref 3.3–70.6)
LYMPHOCYTES # BLD: 36.7 %
LYMPHOCYTES ABSOLUTE: 1.5 THOU/MM3 (ref 1–4.8)
MAGNESIUM: 2.2 MG/DL (ref 1.6–2.4)
MCH RBC QN AUTO: 30.3 PG (ref 26–33)
MCHC RBC AUTO-ENTMCNC: 32.2 GM/DL (ref 32.2–35.5)
MCV RBC AUTO: 94.1 FL (ref 81–99)
MONOCYTES # BLD: 7.5 %
MONOCYTES ABSOLUTE: 0.3 THOU/MM3 (ref 0.4–1.3)
NUCLEATED RED BLOOD CELLS: 0 /100 WBC
PLATELET # BLD: 283 THOU/MM3 (ref 130–400)
PMV BLD AUTO: 10 FL (ref 9.4–12.4)
PROGESTERONE LEVEL: < 0.05 NG/ML
PTH INTACT: 27.2 PG/ML (ref 15–65)
RBC # BLD: 4.23 MILL/MM3 (ref 4.2–5.4)
SEDIMENTATION RATE, ERYTHROCYTE: 9 MM/HR (ref 0–20)
SEG NEUTROPHILS: 53.2 %
SEGMENTED NEUTROPHILS ABSOLUTE COUNT: 2.2 THOU/MM3 (ref 1.8–7.7)
T3 TOTAL: 92 NG/DL (ref 72–181)
T4 FREE: 1.13 NG/DL (ref 0.93–1.76)
TRIGL SERPL-MCNC: 92 MG/DL (ref 0–199)
TSH SERPL DL<=0.05 MIU/L-ACNC: 1.38 UIU/ML (ref 0.4–4.2)
VITAMIN D 25-HYDROXY: 49 NG/ML (ref 30–100)
WBC # BLD: 4.1 THOU/MM3 (ref 4.8–10.8)

## 2020-06-06 LAB
C-REACTIVE PROTEIN, HIGH SENSITIVITY: 0.8 MG/L
DHEAS (DHEA SULFATE): 85 UG/DL (ref 10–90)
HOMOCYSTEINE: 6.6 UMOL/L
T3 FREE: 2.99 PG/ML (ref 2.02–4.43)
THYROXINE (T4): 5.6 UG/DL (ref 4.5–12)

## 2020-06-08 LAB
DHEA UNCONJUGATED: 1.13 NG/ML (ref 0.63–4.7)
GLIADIN PEPTIDE IGG: < 0.4 U/ML
TESTOSTERONE, FREE W SHGB, FEMALES/CHILDREN: NORMAL
THYROID PEROXIDASE ANTIBODY: < 10 IU/ML (ref 0–35)

## 2020-06-09 ENCOUNTER — OFFICE VISIT (OUTPATIENT)
Dept: FAMILY MEDICINE CLINIC | Age: 75
End: 2020-06-09
Payer: MEDICARE

## 2020-06-09 VITALS
SYSTOLIC BLOOD PRESSURE: 122 MMHG | HEART RATE: 77 BPM | TEMPERATURE: 97 F | RESPIRATION RATE: 10 BRPM | HEIGHT: 64 IN | WEIGHT: 121.8 LBS | BODY MASS INDEX: 20.79 KG/M2 | OXYGEN SATURATION: 98 % | DIASTOLIC BLOOD PRESSURE: 60 MMHG

## 2020-06-09 PROBLEM — E55.9 VITAMIN D DEFICIENCY: Status: ACTIVE | Noted: 2020-06-09

## 2020-06-09 PROBLEM — E78.2 MIXED HYPERLIPIDEMIA: Status: ACTIVE | Noted: 2020-06-09

## 2020-06-09 PROBLEM — R53.83 OTHER FATIGUE: Status: ACTIVE | Noted: 2020-06-09

## 2020-06-09 PROBLEM — G89.29 CHRONIC PAIN: Status: ACTIVE | Noted: 2020-06-09

## 2020-06-09 PROCEDURE — 99215 OFFICE O/P EST HI 40 MIN: CPT | Performed by: NURSE PRACTITIONER

## 2020-06-09 PROCEDURE — 1123F ACP DISCUSS/DSCN MKR DOCD: CPT | Performed by: NURSE PRACTITIONER

## 2020-06-09 PROCEDURE — G8427 DOCREV CUR MEDS BY ELIG CLIN: HCPCS | Performed by: NURSE PRACTITIONER

## 2020-06-09 PROCEDURE — 1036F TOBACCO NON-USER: CPT | Performed by: NURSE PRACTITIONER

## 2020-06-09 PROCEDURE — 1090F PRES/ABSN URINE INCON ASSESS: CPT | Performed by: NURSE PRACTITIONER

## 2020-06-09 PROCEDURE — 4040F PNEUMOC VAC/ADMIN/RCVD: CPT | Performed by: NURSE PRACTITIONER

## 2020-06-09 PROCEDURE — G8400 PT W/DXA NO RESULTS DOC: HCPCS | Performed by: NURSE PRACTITIONER

## 2020-06-09 PROCEDURE — 3017F COLORECTAL CA SCREEN DOC REV: CPT | Performed by: NURSE PRACTITIONER

## 2020-06-09 PROCEDURE — G8420 CALC BMI NORM PARAMETERS: HCPCS | Performed by: NURSE PRACTITIONER

## 2020-06-09 NOTE — PROGRESS NOTES
(CLARITIN) 10 MG CAPS Take 10 mg by mouth as needed        No current facility-administered medications for this visit. Allergies   Allergen Reactions    Gluten Meal Other (See Comments)     Gluten,fruit, and sugar causes blisters of mouth    Orange Oil Other (See Comments)     Any acidic fruit    Pcn [Penicillins] Other (See Comments)     Unknown  Childhood reaction. Subjective:    Review of Systems    Objective:     Vitals:    06/09/20 0815   BP: 122/60   Site: Right Upper Arm   Position: Sitting   Cuff Size: Medium Adult   Pulse: 77   Resp: 10   Temp: 97 °F (36.1 °C)   TempSrc: Temporal   SpO2: 98%   Weight: 121 lb 12.8 oz (55.2 kg)   Height: 5' 4.49\" (1.638 m)       Body mass index is 20.59 kg/m². Wt Readings from Last 3 Encounters:   06/09/20 121 lb 12.8 oz (55.2 kg)   02/04/20 119 lb (54 kg)   11/26/19 123 lb 3.2 oz (55.9 kg)     BP Readings from Last 3 Encounters:   06/09/20 122/60   02/04/20 136/70   11/26/19 130/72       Physical Exam    Lab Results   Component Value Date    WBC 4.1 (L) 06/05/2020    HGB 12.8 06/05/2020    HCT 39.8 06/05/2020     06/05/2020    CHOL 286 (H) 06/05/2020    TRIG 92 06/05/2020     06/05/2020    LDLCALC 166 06/05/2020    AST 21 08/21/2019     08/21/2019    K 5.4 (H) 08/21/2019     08/21/2019    CREATININE 0.90 08/21/2019    BUN 25 08/21/2019    CO2 28 08/21/2019    TSH 1.380 06/05/2020    LABA1C 5.4 06/05/2020    LABGLOM 83 (A) 04/08/2016    MG 2.2 06/05/2020    CALCIUM 9.6 06/05/2020    VITD25 49 06/05/2020       Assessment:       Diagnosis Orders   1. Encounter for herb and vitamin supplement management     2.  Mixed hyperlipidemia  Vitamin D 25 Hydroxy    Magnesium    Basic Metabolic Panel, Without Calcium    CBC Auto Differential    DHEA    DHEA-Sulfate    PTH, Intact    Rheumatoid Factor    Sedimentation Rate    T3    T3, Free    T4    T4, Free    Thyroglobulin and anti-Thyroglobulin AB    TSH without Reflex    Homocysteine, Serum Lipid Panel    Hemoglobin A1C    Testosterone, Free W Shgb, Females/Children   3. Vitamin D deficiency  Vitamin D 25 Hydroxy    Magnesium    Basic Metabolic Panel, Without Calcium    CBC Auto Differential    DHEA    DHEA-Sulfate    PTH, Intact    Rheumatoid Factor    Sedimentation Rate    T3    T3, Free    T4    T4, Free    Thyroglobulin and anti-Thyroglobulin AB    TSH without Reflex    Homocysteine, Serum    Lipid Panel    Hemoglobin A1C    Testosterone, Free W Shgb, Females/Children   4. Other fatigue  Vitamin D 25 Hydroxy    Magnesium    Basic Metabolic Panel, Without Calcium    CBC Auto Differential    DHEA    DHEA-Sulfate    PTH, Intact    Rheumatoid Factor    Sedimentation Rate    T3    T3, Free    T4    T4, Free    Thyroglobulin and anti-Thyroglobulin AB    TSH without Reflex    Homocysteine, Serum    Lipid Panel    Hemoglobin A1C    Testosterone, Free W Shgb, Females/Children   5. Urinary frequency  Vitamin D 25 Hydroxy    Magnesium    Basic Metabolic Panel, Without Calcium    CBC Auto Differential    DHEA    DHEA-Sulfate    PTH, Intact    Rheumatoid Factor    Sedimentation Rate    T3    T3, Free    T4    T4, Free    Thyroglobulin and anti-Thyroglobulin AB    TSH without Reflex    Homocysteine, Serum    Lipid Panel    Hemoglobin A1C    Testosterone, Free W Shgb, Females/Children   6. Other intestinal malabsorption  Vitamin D 25 Hydroxy    Magnesium    Basic Metabolic Panel, Without Calcium    CBC Auto Differential    DHEA    DHEA-Sulfate    PTH, Intact    Rheumatoid Factor    Sedimentation Rate    T3    T3, Free    T4    T4, Free    Thyroglobulin and anti-Thyroglobulin AB    TSH without Reflex    Homocysteine, Serum    Lipid Panel    Hemoglobin A1C    Testosterone, Free W Shgb, Females/Children   7.  Low glucose level  Vitamin D 25 Hydroxy    Magnesium    Basic Metabolic Panel, Without Calcium    CBC Auto Differential    DHEA    DHEA-Sulfate    PTH, Intact    Rheumatoid Factor    Sedimentation Rate    T3 T3, Free    T4    T4, Free    Thyroglobulin and anti-Thyroglobulin AB    TSH without Reflex    Homocysteine, Serum    Lipid Panel    Hemoglobin A1C    Testosterone, Free W Shgb, Females/Children   8. Blood tests for routine general physical examination  Vitamin D 25 Hydroxy    Magnesium    Basic Metabolic Panel, Without Calcium    CBC Auto Differential    DHEA    DHEA-Sulfate    PTH, Intact    Rheumatoid Factor    Sedimentation Rate    T3    T3, Free    T4    T4, Free    Thyroglobulin and anti-Thyroglobulin AB    TSH without Reflex    Homocysteine, Serum    Lipid Panel    Hemoglobin A1C    Testosterone, Free W Shgb, Females/Children   9. Hot flashes  Vitamin D 25 Hydroxy    Magnesium    Basic Metabolic Panel, Without Calcium    CBC Auto Differential    DHEA    DHEA-Sulfate    PTH, Intact    Rheumatoid Factor    Sedimentation Rate    T3    T3, Free    T4    T4, Free    Thyroglobulin and anti-Thyroglobulin AB    TSH without Reflex    Homocysteine, Serum    Lipid Panel    Hemoglobin A1C    Testosterone, Free W Shgb, Females/Children       Plan:          · Please take the supplements as directed by Dr. Becca Reyna as reviewed today. · Please ensure you are informing your Primary Care Provider and any Specialist you see, and getting clearance, before starting any supplements. · Will work on removing grains, latex like proteins in foods, and excess plant oil foods such as seeds, nuts, flax, soy, bananas, avocado, kiwis, humus, granola, poultry, farm-fed fish, and carrageenan (food additive)  · Work on reducing/eliminating gluten from the diet   · To reduce plant oil injury see www.efaeducation. org    · Let us know if we can assist you in any way  · Utilize the online classes  · Please keep all follow-up appointments as scheduled      Please start:      · Vitamin D 1000 units - EVERY OTHER DAY  You can also try to increase your vitamin D level with your diet by eating:  Fatty fish - tuna, mackerel, and salmon.   Foods educational materials - see patient instructions. Discussed use, benefit, and side effects of prescribedmedications. All patient questions answered. Pt voiced understanding. Reviewed health maintenance. Instructed to continue current medications, diet and exercise. Patient agreed with treatment plan. Follow up as directed.     Electronically signed by MATTHEW Sorensen CNP on 6/9/2020 at 8:57 AM

## 2020-06-09 NOTE — PATIENT INSTRUCTIONS
· Please take the supplements as directed by Dr. Анна Concepcion as reviewed today. · Please ensure you are informing your Primary Care Provider and any Specialist you see, and getting clearance, before starting any supplements. · Will work on removing grains, latex like proteins in foods, and excess plant oil foods such as seeds, nuts, flax, soy, bananas, avocado, kiwis, humus, granola, poultry, farm-fed fish, and carrageenan (food additive)  · Work on reducing/eliminating gluten from the diet   · To reduce plant oil injury see www.efaeducation. org    · Let us know if we can assist you in any way  · Utilize the online classes  · Please keep all follow-up appointments as scheduled      Please start:      · Vitamin D 1000 units - EVERY OTHER DAY  You can also try to increase your vitamin D level with your diet by eating:  Fatty fish - tuna, mackerel, and salmon. Foods fortified with vitamin D - some dairy products, orange juice, soy milk, and cereals  Beef liver  Cheese  Egg yolks  · Calcium - 1 tablet - 2 times daily  · Magnesium - 1 tablet -  4 times daily   If you take Magnesium before meals it will stimulate the bowels  If you take Magnesium during or after meals it will not be as stimulating  · B-100  1 tablet -  daily with meals   time released  without vitamin C   [place] [employer]  No Super-B complex    · Fish Oil -  2 capsule - four times daily with meals, [facility] brand - Pharmaceutical grade 1,000 mg (item # [id number])  If you are eating fish for a meal you don't have to take a fish oil  Utilize [employer]. com to assist you in decreasing the amount of Omega-6 (plant oil) in your diet  Cook from scratch, processed/cooked food in store/restaurants has Omega 6   Methyl B12 1000 mcg  - 1 tablet each morning and afternoon             by Jaquelin Granger [facility] [place]. com)   DHEA - 20mg - every morning

## 2020-07-30 LAB
BASOPHILS ABSOLUTE: NORMAL
BASOPHILS RELATIVE PERCENT: NORMAL
EOSINOPHILS ABSOLUTE: NORMAL
EOSINOPHILS RELATIVE PERCENT: NORMAL
HCT VFR BLD CALC: 36.8 % (ref 36–46)
HEMOGLOBIN: 12 G/DL (ref 12–16)
LYMPHOCYTES ABSOLUTE: 32 /ΜL
LYMPHOCYTES RELATIVE PERCENT: 32 %
MCH RBC QN AUTO: 30 PG
MCHC RBC AUTO-ENTMCNC: 32.6 G/DL
MCV RBC AUTO: 93.4 FL
MONOCYTES ABSOLUTE: 5.4 /ΜL
MONOCYTES RELATIVE PERCENT: 0.3 %
NEUTROPHILS ABSOLUTE: 62.6 /ΜL
NEUTROPHILS RELATIVE PERCENT: 3.3 %
PDW BLD-RTO: 13.8 %
PLATELET # BLD: 291 K/ΜL
PMV BLD AUTO: 7.4 FL
RBC # BLD: 3.9 10^6/ΜL
WBC # BLD: 5.2 10^3/ML

## 2020-09-03 PROBLEM — R73.09 LOW GLUCOSE LEVEL: Status: ACTIVE | Noted: 2020-09-03

## 2020-09-03 PROBLEM — E16.2 LOW GLUCOSE LEVEL: Status: ACTIVE | Noted: 2020-09-03

## 2020-10-12 ENCOUNTER — HOSPITAL ENCOUNTER (EMERGENCY)
Age: 75
Discharge: HOME | End: 2020-10-12
Payer: MEDICARE

## 2020-10-12 VITALS
WEIGHT: 117 LBS | HEIGHT: 64 IN | DIASTOLIC BLOOD PRESSURE: 77 MMHG | BODY MASS INDEX: 19.97 KG/M2 | HEART RATE: 88 BPM | OXYGEN SATURATION: 97 % | SYSTOLIC BLOOD PRESSURE: 169 MMHG | RESPIRATION RATE: 18 BRPM | TEMPERATURE: 97 F

## 2020-10-12 PROCEDURE — 6360000002 HC RX W HCPCS: Performed by: NURSE PRACTITIONER

## 2020-10-12 PROCEDURE — 99212 OFFICE O/P EST SF 10 MIN: CPT

## 2020-10-12 PROCEDURE — 90715 TDAP VACCINE 7 YRS/> IM: CPT | Performed by: NURSE PRACTITIONER

## 2020-10-12 PROCEDURE — 99213 OFFICE O/P EST LOW 20 MIN: CPT | Performed by: NURSE PRACTITIONER

## 2020-10-12 PROCEDURE — 90471 IMMUNIZATION ADMIN: CPT | Performed by: NURSE PRACTITIONER

## 2020-10-12 RX ADMIN — TETANUS TOXOID, REDUCED DIPHTHERIA TOXOID AND ACELLULAR PERTUSSIS VACCINE, ADSORBED 0.5 ML: 5; 2.5; 8; 8; 2.5 SUSPENSION INTRAMUSCULAR at 14:17

## 2020-10-12 ASSESSMENT — PAIN DESCRIPTION - PAIN TYPE: TYPE: ACUTE PAIN

## 2020-10-12 ASSESSMENT — PAIN DESCRIPTION - DESCRIPTORS: DESCRIPTORS: ACHING

## 2020-10-12 ASSESSMENT — PAIN DESCRIPTION - PROGRESSION: CLINICAL_PROGRESSION: NOT CHANGED

## 2020-10-12 ASSESSMENT — PAIN DESCRIPTION - LOCATION: LOCATION: HAND

## 2020-10-12 ASSESSMENT — PAIN DESCRIPTION - FREQUENCY: FREQUENCY: INTERMITTENT

## 2020-10-12 ASSESSMENT — PAIN SCALES - GENERAL: PAINLEVEL_OUTOF10: 3

## 2020-10-12 ASSESSMENT — PAIN DESCRIPTION - ORIENTATION: ORIENTATION: LEFT

## 2020-10-12 NOTE — ED PROVIDER NOTES
MG TABS    Take by mouth as needed    GARLIC 500 MG CAPS    Take 1 capsule by mouth 2 times daily    GINGER, ZINGIBER OFFICINALIS, (GINGER ROOT) 500 MG CAPS    Take 1 capsule by mouth daily    LETROZOLE (FEMARA) 2.5 MG TABLET    Take 1 tablet by mouth daily    LORATADINE (CLARITIN) 10 MG CAPS    Take 10 mg by mouth as needed     MELATONIN 3 MG TABS TABLET    Take 5 mg by mouth nightly as needed    NONFORMULARY    Take 1 capsule by mouth daily as needed Orega Resp usually used in October    NONFORMULARY    Take 1 capsule by mouth 2 times daily as needed Respiration Blend SP-3 usually used in October    NONFORMULARY    Take 2 capsules by mouth every evening Magtein 1 at supper and 1 at bedtime    OLIVE LEAF EXTRACT 250 MG CAPS    Take 1 capsule by mouth 2 times daily    OMEGA-3 FATTY ACIDS (FISH OIL) 1200 MG CAPS    Take 2 capsules by mouth 3 times daily (before meals) [facility] pharmaceutical grade when out    POTASSIUM 99 MG TABS    Take 1 capsule by mouth every other day     TURMERIC CURCUMIN 500 MG CAPS    Take 1 capsule by mouth daily    VITAMIN B-6 (PYRIDOXINE) 50 MG TABLET    Take 50 mg by mouth daily     VITAMIN D (CHOLECALCIFEROL) 5000 UNITS CAPS CAPSULE    Take 1,000 Units by mouth every other day     VITAMIN E 400 UNIT CAPSULE    Take 400 Units by mouth daily       ALLERGIES     Patient is is allergic to gluten meal; orange oil; and pcn [penicillins].     Patients   Immunization History   Administered Date(s) Administered    Influenza Whole 12/05/2008    Influenza, High Dose (Fluzone 65 yrs and older) 12/07/2016, 10/24/2017, 10/11/2018, 08/17/2020    Influenza, MDCK Quadv, with preserv IM (Flucelvax 4 yrs and older) 10/17/2019    Pneumococcal Conjugate 13-valent (Benjaminleobardo Grimm) 11/05/2018    Tdap (Boostrix, Adacel) 10/12/2020       FAMILY HISTORY     Patient's family history includes Breast Cancer in her sister; Cancer in her mother; Diabetes in her mother; High Blood Pressure in her brother and mother; No Known Problems in her brother, brother, and father. SOCIAL HISTORY     Patient  reports that she has never smoked. She has never used smokeless tobacco. She reports that she does not drink alcohol or use drugs. PHYSICAL EXAM     ED TRIAGE VITALS  BP: (!) 165/81, Temp: 97 °F (36.1 °C), Pulse: 82, Resp: 18, SpO2: 98 %,Estimated body mass index is 20.08 kg/m² as calculated from the following:    Height as of this encounter: 5' 4\" (1.626 m). Weight as of this encounter: 117 lb (53.1 kg). ,No LMP recorded. Patient is postmenopausal.    Physical Exam  Constitutional:       General: She is not in acute distress. Appearance: Normal appearance. She is not ill-appearing, toxic-appearing or diaphoretic. Musculoskeletal: Normal range of motion. General: Tenderness (dorsal aspect of left hand) present. No swelling. Skin:     General: Skin is warm. Capillary Refill: Capillary refill takes less than 2 seconds. Findings: Erythema present. No bruising. Neurological:      General: No focal deficit present. Mental Status: She is alert and oriented to person, place, and time. Sensory: No sensory deficit. Psychiatric:         Mood and Affect: Mood normal.         Behavior: Behavior normal.         Thought Content: Thought content normal.         Judgment: Judgment normal.         DIAGNOSTIC RESULTS     Labs:No results found for this visit on 10/12/20. IMAGING:    No orders to display     URGENT CARE COURSE:     Vitals:    10/12/20 1359   BP: (!) 165/81   Pulse: 82   Resp: 18   Temp: 97 °F (36.1 °C)   TempSrc: Tympanic   SpO2: 98%   Weight: 117 lb (53.1 kg)   Height: 5' 4\" (1.626 m)       Medications   Tetanus-Diphth-Acell Pertussis (BOOSTRIX) injection 0.5 mL (0.5 mLs Intramuscular Given 10/12/20 1417)            PROCEDURES:  None    FINAL IMPRESSION      1.  Cat bite of hand, initial encounter          DISPOSITION/ PLAN   Patient was discharged home with instructions to utilize over-the-counter Neosporin over puncture wound from cat bite. She did receive a tetanus vaccine while at the urgent care. Patient is informed that if her swelling, redness, or tenderness does not seem to be improving within the next 4 to 5 days should be reevaluated by primary care provider or in urgent care setting, as oral antibiotics may be warranted.          PATIENT REFERRED TO:  MATTHEW Pelaez CNP  [address] [place] [address]      DISCHARGE MEDICATIONS:  New Prescriptions    No medications on file       Discontinued Medications    No medications on file       Current Discharge Medication List          MATTHEW Romero NP    (Please note that portions of this note were completed with a voice recognition program. Efforts were made to edit the dictations but occasionally words are mis-transcribed.)          MATTHEW Montalvo NP  10/12/20 25 [id number]

## 2020-10-12 NOTE — ED NOTES
Patient ambulates back to room 7 at [facility] with complaints of cat bite to the left hand. Patient states this happened last night and that she poured peroxide over it twice since its happened. Patient reports needing a tetanus shot.       Jacy Rosario RN  10/12/20 2442

## 2020-10-13 ENCOUNTER — TELEPHONE (OUTPATIENT)
Dept: FAMILY MEDICINE CLINIC | Age: 75
End: 2020-10-13

## 2020-10-13 NOTE — TELEPHONE ENCOUNTER
[facility]  [address], [address]  Phone: [phone]  Fax: [phone]    October 13, 2020    Davin Kerr  [address] [facility]    Dear Mic Giles,    This letter is regarding your Emergency Department (ED) visit at [address] [address] on 10/12/20. Cherelle Quintanilla wanted to make sure that you understand your discharge instructions and that you were able to fill any prescriptions that may have been ordered for you. Please contact the office at the above phone number if the ED advised you to follow up with Jaylon Hodges, or if you have any further questions or needs. Also did you know -   *Visiting the ED for a non-emergency could result in higher co-pays than you would normally be subject to paying? *You can call your doctor even after hours so they can direct you to the most appropriate care. [facility]) practices can often offer you an appointment on the same day that you call. *We have some [facility] offices that offer Walk-in appointments; check our website for availability in your community, [email or web address].      *Evisits are now available for patients for $36 through [employer] for certain conditions:  * Sinus, cold and or cough       * Diarrhea            * Headache  * Heartburn                                * Poison Grisel          * Back pain     * Urinary problems                         If you do not have [employer] and are interested, please contact the office and a staff member may assist you or go to [email or web address].     Sincerely,   MATTHEW Wilkes CNP and your [facility]

## 2020-11-03 ENCOUNTER — OFFICE VISIT (OUTPATIENT)
Dept: FAMILY MEDICINE CLINIC | Age: 75
End: 2020-11-03
Payer: MEDICARE

## 2020-11-03 VITALS
HEART RATE: 88 BPM | BODY MASS INDEX: 20.93 KG/M2 | TEMPERATURE: 96.4 F | DIASTOLIC BLOOD PRESSURE: 66 MMHG | RESPIRATION RATE: 16 BRPM | OXYGEN SATURATION: 98 % | HEIGHT: 64 IN | SYSTOLIC BLOOD PRESSURE: 128 MMHG | WEIGHT: 122.6 LBS

## 2020-11-03 PROCEDURE — 3017F COLORECTAL CA SCREEN DOC REV: CPT | Performed by: STUDENT IN AN ORGANIZED HEALTH CARE EDUCATION/TRAINING PROGRAM

## 2020-11-03 PROCEDURE — G0439 PPPS, SUBSEQ VISIT: HCPCS | Performed by: STUDENT IN AN ORGANIZED HEALTH CARE EDUCATION/TRAINING PROGRAM

## 2020-11-03 PROCEDURE — G0009 ADMIN PNEUMOCOCCAL VACCINE: HCPCS | Performed by: FAMILY MEDICINE

## 2020-11-03 PROCEDURE — G8482 FLU IMMUNIZE ORDER/ADMIN: HCPCS | Performed by: STUDENT IN AN ORGANIZED HEALTH CARE EDUCATION/TRAINING PROGRAM

## 2020-11-03 PROCEDURE — 1123F ACP DISCUSS/DSCN MKR DOCD: CPT | Performed by: STUDENT IN AN ORGANIZED HEALTH CARE EDUCATION/TRAINING PROGRAM

## 2020-11-03 PROCEDURE — 4040F PNEUMOC VAC/ADMIN/RCVD: CPT | Performed by: STUDENT IN AN ORGANIZED HEALTH CARE EDUCATION/TRAINING PROGRAM

## 2020-11-03 PROCEDURE — 90732 PPSV23 VACC 2 YRS+ SUBQ/IM: CPT | Performed by: FAMILY MEDICINE

## 2020-11-03 ASSESSMENT — PATIENT HEALTH QUESTIONNAIRE - PHQ9
SUM OF ALL RESPONSES TO PHQ QUESTIONS 1-9: 0
SUM OF ALL RESPONSES TO PHQ QUESTIONS 1-9: 0
SUM OF ALL RESPONSES TO PHQ9 QUESTIONS 1 & 2: 0
2. FEELING DOWN, DEPRESSED OR HOPELESS: 0
SUM OF ALL RESPONSES TO PHQ QUESTIONS 1-9: 0
1. LITTLE INTEREST OR PLEASURE IN DOING THINGS: 0

## 2020-11-03 ASSESSMENT — LIFESTYLE VARIABLES
AUDIT TOTAL SCORE: INCOMPLETE
HOW OFTEN DO YOU HAVE A DRINK CONTAINING ALCOHOL: NEVER
AUDIT-C TOTAL SCORE: INCOMPLETE
HOW OFTEN DO YOU HAVE A DRINK CONTAINING ALCOHOL: 0

## 2020-11-03 NOTE — PROGRESS NOTES
Immunizations Administered     Name Date Dose Route    Pneumococcal Polysaccharide (Caoovikth10) 11/3/2020 0.5 mL Intramuscular    Site: Deltoid- Right    Lot: [id number]    NDC: 7971-7812-10

## 2020-11-03 NOTE — PROGRESS NOTES
by mouth daily Yes Historical Provider, MD   melatonin 3 MG TABS tablet Take 5 mg by mouth nightly as needed Yes Historical Provider, MD   Garlic 110 MG CAPS Take 1 capsule by mouth 2 times daily Yes Historical Provider, MD   Calcium Carbonate-Vit D-Min (CALCIUM 1200 PO) Take 1 tablet by mouth 2 times daily (before meals)  Yes Historical Provider, MD   Omega-3 Fatty Acids (FISH OIL) 1200 MG CAPS Take 2 capsules by mouth 3 times daily (before meals) [facility] pharmaceutical grade when out Yes Historical Provider, MD   vitamin E 400 UNIT capsule Take 400 Units by mouth daily Yes Historical Provider, MD   vitamin B-6 (PYRIDOXINE) 50 MG tablet Take 50 mg by mouth daily  Yes Historical Provider, MD   Cyanocobalamin (VITAMIN B-12) 3000 MCG SUBL Place 2 tablets under the tongue daily  Yes Historical Provider, MD   Biotin 2500 MCG CAPS Take 1 tablet by mouth every other day  Yes Historical Provider, MD   NONFORMULARY Take 1 capsule by mouth daily as needed Orega Resp usually used in October Yes Historical Provider, MD   NONFORMULARY Take 1 capsule by mouth 2 times daily as needed Respiration Blend SP-3 usually used in October Yes Historical Provider, MD   Olive Leaf Extract 250 MG CAPS Take 1 capsule by mouth 2 times daily Yes Historical Provider, MD   Cetirizine HCl (ZYRTEC ALLERGY) 10 MG CAPS Take by mouth nightly  Yes Historical Provider, MD   Loratadine (CLARITIN) 10 MG CAPS Take 10 mg by mouth as needed  Yes Historical Provider, MD         Past Medical History:   Diagnosis Date    Cancer [facility])        Past Surgical History:   Procedure Laterality Date    BREAST SURGERY      FINGER TRIGGER RELEASE Right 2017         Family History   Problem Relation Age of Onset    Diabetes Mother     Cancer Mother         colon    High Blood Pressure Mother     No Known Problems Father     Breast Cancer Sister     High Blood Pressure Brother     No Known Problems Brother     No Known Problems Brother        CareTeam (Including outside providers/suppliers regularly involved in providing care):   Patient Care Team:  MATTHEW Olmstead CNP as PCP - General (Certified Nurse Practitioner)  MATTHEW Olmstead CNP as PCP - [facility] Empaneled Provider    Wt Readings from Last 3 Encounters:   11/03/20 122 lb 9.6 oz (55.6 kg)   10/12/20 117 lb (53.1 kg)   06/09/20 121 lb 12.8 oz (55.2 kg)     Vitals:    11/03/20 0812   BP: 128/66   Site: Left Upper Arm   Position: Sitting   Cuff Size: Medium Adult   Pulse: 88   Resp: 16   Temp: 96.4 °F (35.8 °C)   TempSrc: Temporal   SpO2: 98%   Weight: 122 lb 9.6 oz (55.6 kg)   Height: 5' 4\" (1.626 m)     Body mass index is 21.04 kg/m². Based upon direct observation of the patient, evaluation of cognition reveals recent and remote memory intact. Patient's complete Health Risk Assessment and screening values have been reviewed and are found in Flowsheets. The following problems were reviewed today and where indicated follow up appointments were made and/or referrals ordered.     Positive Risk Factor Screenings with Interventions:     Health Habits/Nutrition:  Health Habits/Nutrition  Do you exercise for at least 20 minutes 2-3 times per week?: Yes  Have you lost any weight without trying in the past 3 months?: No  Do you eat fewer than 2 meals per day?: (!) Yes  Have you seen a dentist within the past year?: (!) No  Body mass index: 21.04  Health Habits/Nutrition Interventions:  · Nutritional issues:  patient states she eats 2 meals a day with a snack  · Dental exam overdue:  goes back later this month     Hearing/Vision:  No exam data present  Hearing/Vision  Do you or your family notice any trouble with your hearing?: (!) Yes  Do you have difficulty driving, watching TV, or doing any of your daily activities because of your eyesight?: No  Have you had an eye exam within the past year?: Yes  Hearing/Vision Interventions:  · Hearing concerns:  had tinnitus and saw a doc in [place], was offered hearing aids but declined, currently feels hearing is fine    Safety:  Safety  Do you have working smoke detectors?: Yes  Have all throw rugs been removed or fastened?: (!) No  Do you have non-slip mats or surfaces in all bathtubs/showers?: Yes  Do all of your stairways have a railing or banister?: (!) No  Are your doorways, halls and stairs free of clutter?: Yes  Do you always fasten your seatbelt when you are in a car?: Yes  Safety Interventions:  · patient states she doesn't have stairs at her home, no concerns for tripping over her rugs    Personalized Preventive Plan   Current Health Maintenance Status  Immunization History   Administered Date(s) Administered    Influenza Whole 12/05/2008    Influenza, High Dose (Fluzone 65 yrs and older) 12/07/2016, 10/24/2017, 10/11/2018, 08/17/2020    Influenza, MDCK Quadv, with preserv IM (Flucelvax 4 yrs and older) 10/17/2019    Pneumococcal Conjugate 13-valent (Idella Mariee) 11/05/2018    Tdap (Boostrix, Adacel) 10/12/2020        Health Maintenance   Topic Date Due    Shingles Vaccine (1 of 2) 02/05/1996    Pneumococcal 65+ years Vaccine (2 of 2 - PPSV23) 11/05/2019    Annual Wellness Visit (AWV)  11/05/2020    Breast cancer screen  08/10/2021    Lipid screen  06/05/2025    Colon cancer screen colonoscopy  07/19/2029    DTaP/Tdap/Td vaccine (2 - Td) 10/12/2030    DEXA (modify frequency per FRAX score)  Completed    Flu vaccine  Completed    Hepatitis C screen  Completed    Hepatitis A vaccine  Aged Out    Hepatitis B vaccine  Aged Out    Hib vaccine  Aged Out    Meningococcal (ACWY) vaccine  Aged Out     Recommendations for [employer] Due: see orders and patient instructions/AVS.  . Recommended screening schedule for the next 5-10 years is provided to the patient in written form: see Patient Joseluis Wang was seen today for medicare awv.     Diagnoses and all orders for this visit:    Need for prophylactic vaccination against Streptococcus pneumoniae (pneumococcus)  -     Pneumococcal polysaccharide vaccine 23-valent PPSV23    Need for prophylactic vaccination and inoculation against varicella  -     zoster recombinant adjuvanted vaccine [facility]) 50 MCG/0.5ML SUSR injection;  Inject 0.5 mLs into the muscle once for 1 dose    Routine general medical examination at a health care facility          Electronically signed by Sarah Arellano MD on 11/3/2020 at 8:57 AM

## 2020-11-03 NOTE — PATIENT INSTRUCTIONS
that they can break easily. The older you are, the more likely you are to get osteoporosis. But with plenty of calcium, vitamin D, and exercise, you can help prevent osteoporosis. The preteen and teen years are a key time for bone building. With the help of calcium, vitamin D, and exercise in those early years and beyond, the bones reach their peak density and strength by age 27. After age 27, your bones naturally start to thin and weaken. The stronger your bones are at around age 27, the lower your risk for osteoporosis. But no matter what your age and risk are, your bones still need calcium, vitamin D, and exercise to stay strong. Also avoid smoking, and limit alcohol. Smoking and heavy alcohol use can make your bones thinner. Talk to your doctor about any special risks you might have, such as having a close relative with osteoporosis or taking a medicine that can weaken bones. Your doctor can tell you the best ways to protect your bones from thinning. Follow-up care is a key part of your treatment and safety. Be sure to make and go to all appointments, and call your doctor if you are having problems. It's also a good idea to know your test results and keep a list of the medicines you take. How can you care for yourself at home? Get enough calcium and vitamin D. The [employer] recommends adults younger than age 46 need 1,000 mg of calcium and 600 IU of vitamin D each day. Women ages 46 to 79 need 1,200 mg of calcium and 600 IU of vitamin D each day. Men ages 46 to 79 need 1,000 mg of calcium and 600 IU of vitamin D each day. Adults 71 and older need 1,200 mg of calcium and 800 IU of vitamin D each day. Eat foods rich in calcium, like yogurt, cheese, milk, and dark green vegetables. Eat foods rich in vitamin D, like eggs, fatty fish, cereal, and fortified milk. Get some sunshine. Your body uses sunshine to make its own vitamin D.  The safest time to be out in the sun is before 10 a.m. or after 3 p.m. Avoid getting sunburned. Sunburn can increase your risk of skin cancer. Talk to your doctor about taking a calcium plus vitamin D supplement. Ask about what type of calcium is right for you, and how much to take at a time. Adults ages 23 to 48 should not get more than 2,500 mg of calcium and 4,000 IU of vitamin D each day, whether it is from supplements and/or food. Adults ages 46 and older should not get more than 2,000 mg of calcium and 4,000 IU of vitamin D each day from supplements and/or food. Get regular bone-building exercise. Weight-bearing and resistance exercises keep bones healthy by working the muscles and bones against gravity. Start out at an exercise level that feels right for you. Add a little at a time until you can do the following:  Do 30 minutes of weight-bearing exercise on most days of the week. Walking, jogging, stair climbing, and dancing are good choices. Do resistance exercises with weights or elastic bands 2 to 3 days a week. Limit alcohol. Drink no more than 1 alcohol drink a day if you are a woman. Drink no more than 2 alcohol drinks a day if you are a man. Do not smoke. Smoking can make bones thin faster. If you need help quitting, talk to your doctor about stop-smoking programs and medicines. These can increase your chances of quitting for good. When should you call for help? Watch closely for changes in your health, and be sure to contact your doctor if:  You need help with a healthy eating plan. You need help with an exercise plan    © 9012-8950 [employer]. Care instructions adapted under license by [facility]. This care instruction is for use with your licensed healthcare professional. If you have questions about a medical condition or this instruction, always ask your healthcare professional. [place] [address] any warranty or liability for your use of this information. Content Version: 9.4.10694;  Last Revised: June 20, impair your mental function. For example, vitamin B12 deficiency can cause a range of symptoms, including confusion. But, what if your nutritional needs are being met? Can herbs and supplements still offer a benefit? Researchers have investigated a range of natural remedies, such as ginkgo , ginseng , and the supplement phosphatidylserine (PS). So far, though, the evidence is inconsistent as to whether these products can improve memory or thinking. If you are interested in taking herbs and supplements, talk to your doctor first because they may interact with other medicines that you are taking. Exercise Regularly    Among the many benefits of regular exercise are increased blood flow to the brain and decreased risk of certain diseases that can interfere with memory function. One study found that even moderate exercise has a beneficial effect. Examples of \"moderate\" exercise include:   Playing 18 holes of golf once a week, without a cart   Playing tennis twice a week   Walking one mile per day   Manage Stress    It can be tough to remember what is important when your mind is cluttered. Make time for relaxation. Choose activities that calm you down, and make it routine. Manage Chronic Conditions    Side effects of high blood pressure , diabetes, and heart disease can interfere with mental function. Many of the lifestyle steps discussed here can help manage these conditions. Strive to eat a healthy diet, exercise regularly, get stress under control, and follow your doctor's advice for your condition. Minimize Medications    Talk to your doctor about the medicines that you take. Some may be unnecessary. Also, healthy lifestyle habits may lower the need for certain drugs.      Last Reviewed: April 2010 Davis Garay MD   Updated: 4/13/2010   ·     [address] [place] [address]       As we get older, changes in balance, gait, strength, vision, hearing, and cognition make even the most youthful senior more prone to accidents. Falls are one of the leading health risks for older people. This increased risk of falling is related to:   Aging process (eg, decreased muscle strength, slowed reflexes)   Higher incidence of chronic health problems (eg, arthritis, diabetes) that may limit mobility, agility or sensory awareness   Side effects of medicine (eg, dizziness, blurred vision)especially medicines like prescription pain medicines and drugs used to treat mental health conditions   Depending on the brittleness of your bones, the consequences of a fall can be serious and long lasting. Home Life   Research by the Association of Aging [facility]) shows that some home accidents among older adults can be prevented by making simple lifestyle changes and basic modifications and repairs to the home environment. Here are some lifestyle changes that experts recommend:   Have your hearing and vision checked regularly. Be sure to wear prescription glasses that are right for you. Speak to your doctor or pharmacist about the possible side effects of your medicines. A number of medicines can cause dizziness. If you have problems with sleep, talk to your doctor. Limit your intake of alcohol. If necessary, use a cane or walker to help maintain your balance. Wear supportive, rubber-soled shoes, even at home. If you live in a region that gets wintry weather, you may want to put special cleats on your shoes to prevent you from slipping on the snow and ice. Exercise regularly to help maintain muscle tone, agility, and balance. Always hold the banister when going up or down stairs. Also, use  bars when getting in or out of the bath or shower, or using the toilet. To avoid dizziness, get up slowly from a lying down position. Sit up first, dangling your legs for a minute or two before rising to a standing position.    Overall Home Safety Check   According to the Consumer Product Safety Commision's \"Older Consumer Home Safety Checklist,\" it is important to check for potential hazards in each room. And remember, proper lighting is an essential factor in home safety. If you cannot see clearly, you are more likely to fall. Important questions to ask yourself include:   Are lamp, electric, extension, and telephone cords placed out of the flow of traffic and maintained in good condition? Have frayed cords been replaced? Are all small rugs and runners slip resistant? If not, you can secure them to the floor with a special double-sided carpet tape. Are smoke detectors properly locatedone on every floor of your home and one outside of every sleeping area? Are they in good working order? Are batteries replaced at least once a year? Do you have a well-maintained carbon monoxide detector outside every sleeping are in your home? Does your furniture layout leave plenty of space to maneuver between and around chairs, tables, beds, and sofas? Are hallways, stairs and passages between rooms well lit? Can you reach a lamp without getting out of bed? Are floor surfaces well maintained? Shag rugs, high-pile carpeting, tile floors, and polished wood floors can be particularly slippery. Stairs should always have handrails and be carpeted or fitted with a non-skid tread. Is your telephone easily reachable. Is the cord safely tucked away? Room by Room   According to the Association of Aging, bathrooms and sandeep are the two most potentially hazardous rooms in your home. In the Kitchen    Be sure your stove is in proper working order and always make sure burners and the oven are off before you go out or go to sleep. Keep pots on the back burners, turn handles away from the front of the stove, and keep stove clean and free of grease build-up. Kitchen ventilation systems and range exhausts should be working properly. Keep flammable objects such as towels and pot holders away from the cooking area except when in use.  Make sure kitchen curtains are tied back. Move cords and appliances away from the sink and hot surfaces. If extension cords are needed, install wiring guides so they do not hang over the sink, range, or working areas. Look for coffee pots, kettles and toaster ovens with automatic shut-offs. Keep a mop handy in the kitchen so you can wipe up spills instantly. You should also have a small fire extinguisher. Arrange your kitchen with frequently used items on lower shelves to avoid the need to stand on a stepstool to reach them. Make sure countertops are well-lit to avoid injuries while cutting and preparing food. In the Bathroom    Use a non-slip mat or decals in the tub and shower, since wet, soapy tile or porcelain surfaces are extremely slippery. Make sure bathroom rugs are non-skid or tape them firmly to the floor. Bathtubs should have at least one, preferably two, grab bars, firmly attached to structural supports in the wall. (Do not use built-in soap holders or glass shower doors as grab bars.)    Tub seats fitted with non-slip material on the legs allow you to wash sitting down. For people with limited mobility, bathtub transfer benches allow you to slide safely into the tub. Raised toilet seats and toilet safety rails are helpful for those with knee or hip problems. In the [facility]    Make sure you use a nightlight and that the area around your bed is clear of potential obstacles. Be careful with electric blankets and never go to sleep with a heating pad, which can cause serious burns even if on a low setting. Use fire-resistant mattress covers and pillows, and NEVER smoke in bed. Keep a phone next to the bed that is programmed to dial 911 at the push of a button. If you have a chronic condition, you may want to sign on with an automatic call-in service. Typically the system includes a small pendant that connects directly to an emergency medical voice-response system.  You should also make arrangements

## 2020-11-23 ENCOUNTER — NURSE ONLY (OUTPATIENT)
Dept: LAB | Age: 75
End: 2020-11-23

## 2020-11-23 LAB
ANION GAP SERPL CALCULATED.3IONS-SCNC: 14 MEQ/L (ref 8–16)
AVERAGE GLUCOSE: 114 MG/DL (ref 70–126)
BASOPHILS # BLD: 0.7 %
BASOPHILS ABSOLUTE: 0 THOU/MM3 (ref 0–0.1)
BUN BLDV-MCNC: 26 MG/DL (ref 7–22)
CHLORIDE BLD-SCNC: 100 MEQ/L (ref 98–111)
CHOLESTEROL, TOTAL: 293 MG/DL (ref 100–199)
CO2: 24 MEQ/L (ref 23–33)
CREAT SERPL-MCNC: 0.8 MG/DL (ref 0.4–1.2)
EOSINOPHIL # BLD: 0.9 %
EOSINOPHILS ABSOLUTE: 0 THOU/MM3 (ref 0–0.4)
ERYTHROCYTE [DISTWIDTH] IN BLOOD BY AUTOMATED COUNT: 14.1 % (ref 11.5–14.5)
ERYTHROCYTE [DISTWIDTH] IN BLOOD BY AUTOMATED COUNT: 48.1 FL (ref 35–45)
GFR SERPL CREATININE-BSD FRML MDRD: 70 ML/MIN/1.73M2
GLUCOSE BLD-MCNC: 102 MG/DL (ref 70–108)
HBA1C MFR BLD: 5.8 % (ref 4.4–6.4)
HCT VFR BLD CALC: 40.9 % (ref 37–47)
HDLC SERPL-MCNC: 103 MG/DL
HEMOGLOBIN: 13.5 GM/DL (ref 12–16)
IMMATURE GRANS (ABS): 0.01 THOU/MM3 (ref 0–0.07)
IMMATURE GRANULOCYTES: 0.2 %
LDL CHOLESTEROL CALCULATED: 170 MG/DL
LYMPHOCYTES # BLD: 33.7 %
LYMPHOCYTES ABSOLUTE: 1.8 THOU/MM3 (ref 1–4.8)
MAGNESIUM: 2.3 MG/DL (ref 1.6–2.4)
MCH RBC QN AUTO: 30.8 PG (ref 26–33)
MCHC RBC AUTO-ENTMCNC: 33 GM/DL (ref 32.2–35.5)
MCV RBC AUTO: 93.4 FL (ref 81–99)
MONOCYTES # BLD: 7.9 %
MONOCYTES ABSOLUTE: 0.4 THOU/MM3 (ref 0.4–1.3)
NUCLEATED RED BLOOD CELLS: 0 /100 WBC
PLATELET # BLD: 276 THOU/MM3 (ref 130–400)
PMV BLD AUTO: 10 FL (ref 9.4–12.4)
POTASSIUM SERPL-SCNC: 4.9 MEQ/L (ref 3.5–5.2)
PTH INTACT: 36 PG/ML (ref 15–65)
RBC # BLD: 4.38 MILL/MM3 (ref 4.2–5.4)
RHEUMATOID FACTOR: < 10 IU/ML (ref 0–13)
SEDIMENTATION RATE, ERYTHROCYTE: 13 MM/HR (ref 0–20)
SEG NEUTROPHILS: 56.6 %
SEGMENTED NEUTROPHILS ABSOLUTE COUNT: 3 THOU/MM3 (ref 1.8–7.7)
SODIUM BLD-SCNC: 138 MEQ/L (ref 135–145)
T3 TOTAL: 90 NG/DL (ref 72–181)
T4 FREE: 1.11 NG/DL (ref 0.93–1.76)
TRIGL SERPL-MCNC: 101 MG/DL (ref 0–199)
TSH SERPL DL<=0.05 MIU/L-ACNC: 1.64 UIU/ML (ref 0.4–4.2)
VITAMIN D 25-HYDROXY: 45 NG/ML (ref 30–100)
WBC # BLD: 5.3 THOU/MM3 (ref 4.8–10.8)

## 2020-11-24 ENCOUNTER — TELEPHONE (OUTPATIENT)
Dept: FAMILY MEDICINE CLINIC | Age: 75
End: 2020-11-24

## 2020-11-24 LAB
DHEAS (DHEA SULFATE): 250 UG/DL (ref 10–90)
T3 FREE: 2.93 PG/ML (ref 2.02–4.43)
THYROGLOBULIN: NORMAL
THYROXINE (T4): 6.2 UG/DL (ref 4.5–10.9)

## 2020-11-24 NOTE — TELEPHONE ENCOUNTER
----- Message from Delfin Mena DO sent at 11/24/2020  8:13 AM EST -----  There will be a few things to go over with you for Dr Jackee Gosselin visit on the 8th but overall pretty good results

## 2020-11-26 LAB
DHEA UNCONJUGATED: 3.11 NG/ML (ref 0.63–4.7)
TESTOSTERONE, FREE W SHGB, FEMALES/CHILDREN: NORMAL

## 2020-11-30 LAB — HOMOCYSTEINE: 7.9 UMOL/L

## 2020-12-08 ENCOUNTER — OFFICE VISIT (OUTPATIENT)
Dept: FAMILY MEDICINE CLINIC | Age: 75
End: 2020-12-08
Payer: MEDICARE

## 2020-12-08 VITALS
RESPIRATION RATE: 10 BRPM | SYSTOLIC BLOOD PRESSURE: 122 MMHG | TEMPERATURE: 97.4 F | DIASTOLIC BLOOD PRESSURE: 60 MMHG | HEIGHT: 64 IN | BODY MASS INDEX: 21.31 KG/M2 | WEIGHT: 124.8 LBS | HEART RATE: 93 BPM | OXYGEN SATURATION: 98 %

## 2020-12-08 PROCEDURE — 4040F PNEUMOC VAC/ADMIN/RCVD: CPT | Performed by: FAMILY MEDICINE

## 2020-12-08 PROCEDURE — 99214 OFFICE O/P EST MOD 30 MIN: CPT | Performed by: FAMILY MEDICINE

## 2020-12-08 PROCEDURE — G8482 FLU IMMUNIZE ORDER/ADMIN: HCPCS | Performed by: FAMILY MEDICINE

## 2020-12-08 PROCEDURE — G8420 CALC BMI NORM PARAMETERS: HCPCS | Performed by: FAMILY MEDICINE

## 2020-12-08 PROCEDURE — 3017F COLORECTAL CA SCREEN DOC REV: CPT | Performed by: FAMILY MEDICINE

## 2020-12-08 PROCEDURE — 1123F ACP DISCUSS/DSCN MKR DOCD: CPT | Performed by: FAMILY MEDICINE

## 2020-12-08 PROCEDURE — 1090F PRES/ABSN URINE INCON ASSESS: CPT | Performed by: FAMILY MEDICINE

## 2020-12-08 PROCEDURE — G8400 PT W/DXA NO RESULTS DOC: HCPCS | Performed by: FAMILY MEDICINE

## 2020-12-08 PROCEDURE — G8427 DOCREV CUR MEDS BY ELIG CLIN: HCPCS | Performed by: FAMILY MEDICINE

## 2020-12-08 PROCEDURE — 1036F TOBACCO NON-USER: CPT | Performed by: FAMILY MEDICINE

## 2020-12-08 ASSESSMENT — ENCOUNTER SYMPTOMS
TROUBLE SWALLOWING: 1
RESPIRATORY NEGATIVE: 1
EYES NEGATIVE: 1
ALLERGIC/IMMUNOLOGIC NEGATIVE: 1

## 2020-12-08 NOTE — PATIENT INSTRUCTIONS
Thank you   1. Thank you for trusting us with your healthcare needs. You may receive a survey regarding today's visit. It would help us out if you would take a few moments to provide your feedback. We value your input. 2. Please bring in ALL medications BOTTLES, including inhalers, herbal supplements, over the counter, prescribed & non-prescribed medicine. The office would like actual medication bottles and a list.   3. Please note our OFFICE POLICIES:   a. Prior to getting your labs drawn, please check with your insurance company for benefits and eligibility of lab services. Often, insurance companies cover certain tests for preventative visits only. It is patient's responsibility to see what is covered. b. We are unable to change a diagnosis after the test has been performed. c. Lab orders will not be re-printed. Please hold onto your original lab orders and take them to your lab to be completed. d. If you no show your scheduled appointment three times, you will be dismissed from this practice. e. [employer] [place] must be completed 24 hours prior to your schedule appointment. 4. If the list below has been completed, PLEASE FAX RECORDS TO OUR OFFICE @ [phone].  Once the records have been received we will update your records at our office:  Health Maintenance Due   Topic Date Due    Shingles Vaccine (1 of 2) 02[birth year]/1996

## 2020-12-08 NOTE — PROGRESS NOTES
Take 1 tablet by mouth daily, Disp: , Rfl: 3    NONFORMULARY, Take 2 capsules by mouth every evening Magtein 1 at supper and 1 at bedtime, Disp: , Rfl:     B Complex-Folic Acid (C-882 BALANCED TR PO), Take 1 tablet by mouth 2 times daily (before meals) [address] [facility] [place] , Disp: , Rfl:     Potassium 99 MG TABS, Take 1 capsule by mouth every other day , Disp: , Rfl:     diphenhydrAMINE-PE-APAP (ALLERGY RELIEF PLUS SINUS) 25-5-325 MG TABS, Take by mouth as needed, Disp: , Rfl:     vitamin D (CHOLECALCIFEROL) 5000 units CAPS capsule, Take 1,000 Units by mouth every other day , Disp: , Rfl:     Cinnamon 500 MG CAPS, Take 500 mg by mouth 3 times daily , Disp: , Rfl:     Ginger, Zingiber officinalis, (GINGER ROOT) 500 MG CAPS, Take 1 capsule by mouth daily, Disp: , Rfl:     Turmeric Curcumin 500 MG CAPS, Take 1 capsule by mouth daily, Disp: , Rfl:     melatonin 3 MG TABS tablet, Take 5 mg by mouth nightly as needed, Disp: , Rfl:     Garlic 360 MG CAPS, Take 1 capsule by mouth 2 times daily, Disp: , Rfl:     Calcium Carbonate-Vit D-Min (CALCIUM 1200 PO), Take 1 tablet by mouth 2 times daily (before meals) , Disp: , Rfl:     Omega-3 Fatty Acids (FISH OIL) 1200 MG CAPS, Take 2 capsules by mouth 3 times daily (before meals) [facility]S pharmaceutical grade when out, Disp: , Rfl:     vitamin E 400 UNIT capsule, Take 400 Units by mouth daily, Disp: , Rfl:     vitamin B-6 (PYRIDOXINE) 50 MG tablet, Take 50 mg by mouth daily , Disp: , Rfl:     Cyanocobalamin (VITAMIN B-12) 3000 MCG SUBL, Place 2 tablets under the tongue daily , Disp: , Rfl:     Biotin 2500 MCG CAPS, Take 1 tablet by mouth every other day , Disp: , Rfl:     NONFORMULARY, Take 1 capsule by mouth daily as needed Orega Resp usually used in October, Disp: , Rfl:     NONFORMULARY, Take 1 capsule by mouth 2 times daily as needed Respiration Blend SP-3 usually used in October, Disp: , Rfl:     Olive Leaf Extract 250 MG CAPS, Take 1 capsule by mouth 2 times daily, Disp: , Rfl:     Cetirizine HCl (ZYRTEC ALLERGY) 10 MG CAPS, Take by mouth nightly , Disp: , Rfl:     Loratadine (CLARITIN) 10 MG CAPS, Take 10 mg by mouth as needed , Disp: , Rfl:   Allergies: Gluten meal; Orange oil; and Pcn [penicillins],  Immunizations:   Immunization History   Administered Date(s) Administered    Influenza Whole 12/05/2008    Influenza, High Dose (Fluzone 65 yrs and older) 12/07/2016, 10/24/2017, 10/11/2018, 08/17/2020    Influenza, High-dose, Quadv, 65 yrs +, IM (Fluzone) 08/17/2020    Influenza, MDCK Quadv, with preserv IM (Flucelvax 4 yrs and older) 10/17/2019    Pneumococcal Conjugate 13-valent (Dxlplnz19) 11/05/2018    Pneumococcal Polysaccharide ([id number]) 11/03/2020    Tdap (Boostrix, Adacel) 10/12/2020        History of PresentIllness:     Jenifer's had concerns including 6 Month Follow-Up. Anais Orellana  presents to the [address] [place] [address] today for;   Chief Complaint   Patient presents with    6 Month Follow-Up   , ,  abnormal labs follow up and these conditions as she  Is looking today for:        HPI    Subjective:     Review of Systems   Constitutional: Negative. HENT: Positive for trouble swallowing. Eyes: Negative. Respiratory: Negative. Cardiovascular: Negative. Endocrine: Negative. Genitourinary: Negative. Musculoskeletal: Negative. Skin: Positive for rash. Allergic/Immunologic: Negative. Neurological: Negative. Hematological: Negative. Psychiatric/Behavioral: Negative for sleep disturbance. All other systems reviewed and are negative. Objective:     /60 (Site: Right Upper Arm, Position: Sitting, Cuff Size: Medium Adult)   Pulse 93   Temp 97.4 °F (36.3 °C) (Temporal)   Resp 10   Ht 5' 4.02\" (1.626 m)   Wt 124 lb 12.8 oz (56.6 kg)   SpO2 98%   BMI 21.41 kg/m²   Physical Exam  Vitals signs and nursing note reviewed. Constitutional:       Appearance: Normal appearance.    HENT: smaller blood draws over several days  -Patient instructed to check with insurer before each lab draw and to to to the lab which the insurer directs them for the most cost effective lab draw with the least patient's cost  - Glenn Hilario  will be scheduled subsequentto those results. Sonja Rene will bring in her drink and food log to her next visit    Chronic Problems Addressed on this Visit:                                   1.  Intensity of Service; Uncontrolled items at this visit; Chief Complaint   Patient presents with    6 Month Follow-Up   ; Improved items at this visit; Stable items atthis visit;  2. Patients food and drinks were reviewed with the patient,       - Glenn Hilario will bring food+drink symptom log to next visit for inclusion in their record      - 75 better food list reviewed & given topatient with the omega 6 food list to avoid         - Gluten in corn and oats abstracts sheet reviewed and given to the patient today   3. Greater than 25 minutes were spent face to face on this visit of which >50% was for counseling and coordination of care. Patients food and drinks were reviewed with thepatient,   - they will bring a food drink symptom log to future visits for inclusion in their record    - 75 better food list reviewed & given to patient along with the omega 6 food list to avoid      - Glutenin corn and oats abstracts sheet reviewed and given to the patient today    - 23 Foods containing Latex-like proteins was reviewed and copy to be taken if desired     - Nutrient Supplements list provided and copyto be taken if desired    - [employer] web site offered to patient to review at their convenience by staff with login information    Note:  I have discussed with the patient that with all nutraceuticals, there is often mixed data and emerging research which needs to be monitored; as well as an array of NIHfact sheets on nutrients and supplements.      If I have recommended cinnamon at the request of this patient to assist them in control of their blood sugar, triglyceride and or weight issues. I discussed that thepatient's clinical use of cinnamon bark, calcium, magnesium, Vitamin D and pharmaceutical grade CVS #924467 fish oil or triple-strength fish oil, and B-75 two phase time-released B complex by Krista Henry will be for atime-limited trial to determine their individual effectiveness and safety in this patient. I also referred the patient to the NMCD: Nutrition, Metabolism, and Cardiovascular Diseases (journal) and concerns about long-termuse and hepatotoxicity of cinnamon and other nutrients and suggest they frequently search nih.gov for the latest non-proprietary information on nutriceuticals as well as consider a subscription to [employer] fordetails on reviewed supplements, or at the least review the nutrient files at [address] at [facility], [employer], an insulin mimetic, reduces some High Carbohydrate Dietary Impacts. Methylhydroxychalcone polymers insulin-enhancing properties in fat cells are responsible for enhanced glucose uptake, inhibiting hepatic HMG-CoA reductase and lowers lipids. www.jacn. org/content/20/4/327.full     But cinnamon with additivessuch as Cinnamon Extract are not effective as insulin mimetics.  :eStoreDirectory.at     Nutrients for Start up from [employer] or [employer] for ease to get started now ;  Dangelo Casillas has some useable products;  - Triple Strength Fish Oil, enteric coated  - Vit D 3 5000 IU gel caps  - Iron ferrous sulfat 325 mg tabs  - Centrum Silver look-a-like for most patients, or  - Centrum plain look-a-like if need iron    Localpharmacies or chains such as CVS, [facility], Wal-mart, have;  - Triple Strength Fish Oil (enteric coated ifavailable) or    If not enteric coated, can take from freezer for less burps  - B-50 or B-100 released balanced B complex tabs  - Cinnamon bark 500 mg (without Chromium or extracts)   some brands list 1000 mg / serving of 2 capsules,    some brands have 1000 mg caps with the undesireable chromium / extract  - Calcium carbonate/citrate, magnesium oxide/citrate, Vit D 3  as 3-4 tabs/caps/serving     Some Local Brands may contain Zincwhich is acceptable for the first bottle or two  - Magnesium oxide 250 mg tabs for those having < 2 bowel movements daily  - Magnesium citrate 200 mg if having > 2bowel movement/day  - Centrum Silver or look-a-like for most patients, Centrum plain or look-a-like with iron  - Vitamin D-3 comes as 1,000 IU or 2,000 IU or 5,000 IU gel caps or Liquid drops      Some brands containing or derived from soy oil or corn oil are OK if not allergic to soy  - Elemental Iron 65 mg tabsat bedtime is available over the counter if need more iron     Usually turns bowel movements grey, green or black but not a concern  - Apricot Kernel Oil (by Now) for dry skin sensitive perineal or perianal area skin    Nutrients for ongoing use by Mail order for less expense from [employer] ;  - Strength Fish Oil , 240 Softgels Item #[id number]  -B-100 time released balanced B complex Item #[id number]  - Cinnamon bark 500 mg without Chromium or extract Item #995643  - Calcium carbonate 1000 mg, Magnesium oxide 500 mg, Vit D 3  400 IU Item #[id number]  - Magnesium oxide 500 mg tabs Item #793390 if less than 2 bowel movements daily  - [employer] Seniors Item #026238 for mostpatients, One Daily Item #337651 with iron  - Vit D 3  1,000 Item #100504      2,000 IU Item #058737  ,000 IU Item #459081     Some brands containing orderived from soy oil or corn oil are OK if not allergic to soy    Nutrients for Special Needs by Enrique Segovia for less expense from www. puritan.com ;  -Elemental Iron 65 mg tabs Item #305888 if need more iron for low iron on labs    Usually turns bowel movements grey, green or black but not a concern  - Time released Niacin 250 mg Item #962356 for cold intolerance, low libido or impotence  - DHEA 50 mg Item #926764 for improving DHEA levels on labs if having Fatigue    If stools too loose substitute for your Magnesium oxide using;   Magnesium citrate 200 mg tabs(NOT liquid) at [employer]   Magnesium gluconate 550 mgby Zander at [employer]. com or amazon. com  Magnesium chloride foot soaks or body sprays  www.[employer]   Magnesium chloride flakes 14.99 Item #: [id number] if Backordered get spray    Food Drink Symptom Log;  I asked this patient to track these items and any other symptoms on their list on a weekly basis to documenttheir progress or lack of same. This can be done on the symptom tracking sheet I gave them at today's visit but looks like this:                                                      Rate on scale of 0-10 with zero = notnoticeable  Symptom:                            Week 1               2                 3                 4               Etc            Hair loss    Foot cramps    Paresthesia    Aches    IBS (irritable bowel)    Constipation    Diarrhea  Nocturia    (up to bathroom at night)    Fatigue/Energy level  Stress      On the other side of the sheet they can track their food, drink, environment, activity, symptoms etc      Avoiding Latex-like proteins inmy foods; Avocados, Bananas, Celery, Figs & Kiwi proteins have latex-like proteins to inflame our immunesystems  How Can I Have A Latex Allergy? Eating foods with latex-like protein exposes us to latex allergies. Our body cannot tell the differencebetween these latex-like proteins and latex from rubber products since many people are allergic to fruit, vegetables and latex. Read labels on pre-packaged foods. This list to avoid is only a guide if you are known allergicto latex or have a latex rash on your chin, cheeks and lines on your neck and chest. The amount of latex is different in each food product or fruit variety.   to Avoid out of Season if not lettuce, parsley, peas, pears, potatoes,pumpkins, radishes, fall red raspberries, squash, turnips  November; broccoli, cabbage, carrots, parsley,pears, peas  December: use canned, frozen ordried fruits since lower in latex    Upto half of latex-sensitive patients show allergic reactions to fruits (avocados, bananas, kiwifruits, papayas, peaches),   Annals of Allergy, 1994. These plants contain the same proteins that are allergens in latex. People with fruit allergies should warn physicians beforeundergoing procedures which may cause anaphylactic reaction if in contact with latex gloves. Some of the common foods with defined cross-reactivity to latexare avocado, banana, kiwi, chestnut, raw potato, tomato,stone fruits (e.g., peach, cherry), hazelnut, melons, celery, carrot, apple, pear, papaya, and almond. Foods with less well-defined cross-reactivity to latex are peanuts, peppers, citrus fruits, coconut, pineapple, dany,fig, passion fruit, Ugli fruit, and grape    This fruit/latex cross-reactivity is worsened by ethylene, a gas used to hasten commercial ripening. In nature, plants produce low levels of the hormone ethylene, which regulates germination, flowering, and ripening. Forced ripening by high ethyleneconcentrations, plants produce allergenic wound-repair proteins, which are similar to wound-repair proteins made during the tapping of rubber trees. Sensitive individualswho ingest the fruit get a higher dose and worse reaction. Some people may even first become sensitized to latex through fruit. Can food processing increase theconcentrations of allergenic proteins? Latex-sensitized children (and adults) in [place] often experience allergic reactions after eating bananas ripenedartificially with ethylene. In the [facility], food distribution centers treat unripe bananas and other produce with ethylene to ripen; not commonly done in [facility] since fruit is tree-ripened there.  Does treatmentof

## 2021-02-01 ENCOUNTER — HOSPITAL ENCOUNTER (EMERGENCY)
Age: 76
Discharge: HOME | End: 2021-02-01
Payer: MEDICARE

## 2021-02-01 VITALS
OXYGEN SATURATION: 96 % | WEIGHT: 118 LBS | TEMPERATURE: 97.4 F | SYSTOLIC BLOOD PRESSURE: 159 MMHG | BODY MASS INDEX: 20.24 KG/M2 | HEART RATE: 88 BPM | DIASTOLIC BLOOD PRESSURE: 79 MMHG | RESPIRATION RATE: 16 BRPM

## 2021-02-01 DIAGNOSIS — M77.9 TENDONITIS: Primary | ICD-10-CM

## 2021-02-01 PROCEDURE — 99213 OFFICE O/P EST LOW 20 MIN: CPT

## 2021-02-01 PROCEDURE — 99213 OFFICE O/P EST LOW 20 MIN: CPT | Performed by: NURSE PRACTITIONER

## 2021-02-01 RX ORDER — PREDNISONE 20 MG/1
20 TABLET ORAL DAILY
Qty: 7 TABLET | Refills: 0 | Status: SHIPPED | OUTPATIENT
Start: 2021-02-01 | End: 2021-02-08

## 2021-02-01 ASSESSMENT — PAIN DESCRIPTION - PAIN TYPE: TYPE: ACUTE PAIN

## 2021-02-01 ASSESSMENT — ENCOUNTER SYMPTOMS
NAUSEA: 0
VOMITING: 0
SHORTNESS OF BREATH: 0

## 2021-02-01 ASSESSMENT — PAIN DESCRIPTION - ORIENTATION: ORIENTATION: RIGHT

## 2021-02-01 ASSESSMENT — PAIN SCALES - GENERAL: PAINLEVEL_OUTOF10: 10

## 2021-02-01 NOTE — ED TRIAGE NOTES
Pt walked to room 6. Pt here with complaints of right hand pain. Pt was snow blowing the driveway this morning and shoveling and lifting heavy salt bags. Right hand started hurting around 4 pm today. Getting worse.

## 2021-02-01 NOTE — ED PROVIDER NOTES
BIOTIN 2500 MCG CAPS    Take 1 tablet by mouth every other day     CALCIUM CARBONATE-VIT D-MIN (CALCIUM 1200 PO)    Take 1 tablet by mouth 2 times daily (before meals)     CETIRIZINE HCL (ZYRTEC ALLERGY) 10 MG CAPS    Take by mouth nightly     CINNAMON 500 MG CAPS    Take 500 mg by mouth 3 times daily     COENZYME Q10-RED YEAST RICE  MG CAPS    Take 1 capsule by mouth 2 times daily (before meals) Supper and bedtime    CYANOCOBALAMIN (VITAMIN B-12) 3000 MCG SUBL    Place 2 tablets under the tongue daily     DIPHENHYDRAMINE-PE-APAP (ALLERGY RELIEF PLUS SINUS) 25-5-325 MG TABS    Take by mouth as needed    GARLIC 213 MG CAPS    Take 1 capsule by mouth 2 times daily    GINGER, ZINGIBER OFFICINALIS, (GINGER ROOT) 500 MG CAPS    Take 1 capsule by mouth daily    LETROZOLE (FEMARA) 2.5 MG TABLET    Take 1 tablet by mouth daily    LORATADINE (CLARITIN) 10 MG CAPS    Take 10 mg by mouth as needed     MELATONIN 3 MG TABS TABLET    Take 5 mg by mouth nightly as needed    NONFORMULARY    Take 1 capsule by mouth daily as needed Orega Resp usually used in October    NONFORMULARY    Take 1 capsule by mouth 2 times daily as needed Respiration Blend SP-3 usually used in October    NONFORMULARY    Take 2 capsules by mouth every evening Magtein 1 at supper and 1 at bedtime    OLIVE LEAF EXTRACT 250 MG CAPS    Take 1 capsule by mouth 2 times daily    OMEGA-3 FATTY ACIDS (FISH OIL) 1200 MG CAPS    Take 2 capsules by mouth 3 times daily (before meals) [facility]S pharmaceutical grade when out    POTASSIUM 99 MG TABS    Take 1 capsule by mouth every other day     TURMERIC CURCUMIN 500 MG CAPS    Take 1 capsule by mouth daily    VITAMIN B-6 (PYRIDOXINE) 50 MG TABLET    Take 50 mg by mouth daily     VITAMIN D (CHOLECALCIFEROL) 5000 UNITS CAPS CAPSULE    Take 1,000 Units by mouth every other day     VITAMIN E 400 UNIT CAPSULE    Take 400 Units by mouth daily       ALLERGIES Patient is is allergic to gluten meal; orange oil; and pcn [penicillins]. Patients   Immunization History   Administered Date(s) Administered    Influenza Whole 12/05/2008    Influenza, High Dose (Fluzone 65 yrs and older) 12/07/2016, 10/24/2017, 10/11/2018, 08/17/2020    Influenza, High-dose, Deondre Ferguson, 65 yrs +, IM (Fluzone) 08/17/2020    Influenza, MDCK Quadv, with preserv IM (Flucelvax 4 yrs and older) 10/17/2019    Pneumococcal Conjugate 13-valent (Vjkorvg67) 11/05/2018    Pneumococcal Polysaccharide (Luppodcnp80) 11/03/2020    Tdap (Boostrix, Adacel) 10/12/2020       FAMILY HISTORY     Patient's family history includes Breast Cancer in her sister; Cancer in her mother; Diabetes in her mother; High Blood Pressure in her brother and mother; No Known Problems in her brother, brother, and father. SOCIAL HISTORY     Patient  reports that she has never smoked. She has never used smokeless tobacco. She reports that she does not drink alcohol or use drugs. PHYSICAL EXAM     ED TRIAGE VITALS  BP: (!) 159/79, Temp: 97.4 °F (36.3 °C), Pulse: 88, Resp: 16, SpO2: 96 %,Estimated body mass index is 20.24 kg/m² as calculated from the following:    Height as of 12/8/20: 5' 4.02\" (1.626 m). Weight as of this encounter: 118 lb (53.5 kg). ,No LMP recorded. Patient is postmenopausal.    Physical Exam  Vitals signs and nursing note reviewed. Constitutional:       General: She is not in acute distress. Appearance: She is well-developed. She is not diaphoretic. Eyes:      Conjunctiva/sclera:      Right eye: Right conjunctiva is not injected. Left eye: Left conjunctiva is not injected. Pupils: Pupils are equal.   Neck:      Musculoskeletal: Normal range of motion. Cardiovascular:      Rate and Rhythm: Normal rate and regular rhythm. Heart sounds: No murmur. Pulmonary:      Effort: Pulmonary effort is normal. No respiratory distress. Breath sounds: Normal breath sounds.    Musculoskeletal: Right knee: She exhibits normal range of motion. Left knee: She exhibits normal range of motion. Right hand: She exhibits tenderness. She exhibits normal range of motion, no bony tenderness, normal capillary refill and no swelling. Normal sensation noted. Normal strength noted. Hands:    Skin:     General: Skin is warm. Findings: No rash. Neurological:      Mental Status: She is alert and oriented to person, place, and time. Psychiatric:         Behavior: Behavior normal.         DIAGNOSTIC RESULTS     Labs:No results found for this visit on 02/01/21. IMAGING:    No orders to display         EKG:  none    URGENT CARE COURSE:     Vitals:    02/01/21 1757   BP: (!) 159/79   Pulse: 88   Resp: 16   Temp: 97.4 °F (36.3 °C)   TempSrc: Temporal   SpO2: 96%   Weight: 118 lb (53.5 kg)       Medications - No data to display         PROCEDURES:  None    FINAL IMPRESSION      1. Tendonitis          DISPOSITION/ PLAN     Exam is consistent with tendinitis of the third and fourth digits of the right hand. Discussed with the patient the plan to treat with oral prednisone and she is advised use over-the-counter NSAIDs and ice as well. She is instructed to rest the hand as much as possible and follow-up on an outpatient basis as needed. She is agreeable to plan as discussed.       PATIENT REFERRED TO:  MATTHEW Pascal CNP  [address] [address]      DISCHARGE MEDICATIONS:  New Prescriptions    PREDNISONE (DELTASONE) 20 MG TABLET    Take 1 tablet by mouth daily for 7 days       Discontinued Medications    No medications on file       Current Discharge Medication List          MATTHEW Jin CNP    (Please note that portions of this note were completed with a voice recognition program. Efforts were made to edit the dictations but occasionally words are mis-transcribed.)          MATTHEW Jin CNP  02/01/21 2222

## 2021-02-02 ENCOUNTER — TELEPHONE (OUTPATIENT)
Dept: FAMILY MEDICINE CLINIC | Age: 76
End: 2021-02-02

## 2021-02-02 NOTE — LETTER
[address] [place] [address],[address] [facility] [address]  Phone: [phone]  Fax: [phone] [facility] Jyoti Jin, APRN - CNP        February 2, 2021    Baldev Zabala  [address]      Dear Chester Chopra:      [address] [facility]  [address], [address]  Phone: [phone]  Fax: [phone]    February 2, 2021    Lori Head  [address] [facility]    Dear Chester Chopra,    This letter is regarding your Emergency Department (ED) visit at [address] [address] on 2/1/21. Brendan Jin wanted to make sure that you understand your discharge instructions and that you were able to fill any prescriptions that may have been ordered for you. Please contact the office at the above phone number if the ED advised you to follow up with Kelsy Pisano, or if you have any further questions or needs. Also did you know -   *Visiting the ED for a non-emergency could result in higher co-pays than you would normally be subject to paying? *You can call your doctor even after hours so they can direct you to the most appropriate care. [facility]) practices can often offer you an appointment on the same day that you call. *We have some [facility] offices that offer Walk-in appointments; check our website for availability in your community, [email or web address].      *Evisits are now available for patients for $36 through [employer] for certain conditions:  * Sinus, cold and or cough       * Diarrhea            * Headache  * Heartburn                                * Poison Grisel          * Back pain     * Urinary problems                         If you do not have [employer] and are interested, please contact the office and a staff member may assist you or go to [email or web address].     Sincerely, Mariann Schaumann, APRN - CNP and your [facility]       If you have any questions or concerns, please don't hesitate to call.     Sincerely,        Mariann Schaumann, APRN - CNP

## 2021-06-08 ENCOUNTER — NURSE ONLY (OUTPATIENT)
Dept: LAB | Age: 76
End: 2021-06-08

## 2021-06-08 ENCOUNTER — OFFICE VISIT (OUTPATIENT)
Dept: FAMILY MEDICINE CLINIC | Age: 76
End: 2021-06-08
Payer: MEDICARE

## 2021-06-08 VITALS
OXYGEN SATURATION: 98 % | TEMPERATURE: 97.9 F | HEIGHT: 64 IN | HEART RATE: 79 BPM | DIASTOLIC BLOOD PRESSURE: 62 MMHG | WEIGHT: 121 LBS | BODY MASS INDEX: 20.66 KG/M2 | RESPIRATION RATE: 12 BRPM | SYSTOLIC BLOOD PRESSURE: 132 MMHG

## 2021-06-08 DIAGNOSIS — R73.09 LOW GLUCOSE LEVEL: ICD-10-CM

## 2021-06-08 DIAGNOSIS — E78.2 MIXED HYPERLIPIDEMIA: ICD-10-CM

## 2021-06-08 DIAGNOSIS — Z00.00 BLOOD TESTS FOR ROUTINE GENERAL PHYSICAL EXAMINATION: ICD-10-CM

## 2021-06-08 DIAGNOSIS — E55.9 VITAMIN D DEFICIENCY: ICD-10-CM

## 2021-06-08 DIAGNOSIS — K90.89 OTHER INTESTINAL MALABSORPTION: ICD-10-CM

## 2021-06-08 DIAGNOSIS — R35.0 URINARY FREQUENCY: ICD-10-CM

## 2021-06-08 DIAGNOSIS — H04.123 DRY EYES: ICD-10-CM

## 2021-06-08 DIAGNOSIS — R53.83 OTHER FATIGUE: ICD-10-CM

## 2021-06-08 DIAGNOSIS — B02.9 HERPES ZOSTER WITHOUT COMPLICATION: ICD-10-CM

## 2021-06-08 DIAGNOSIS — R23.2 HOT FLASHES: ICD-10-CM

## 2021-06-08 DIAGNOSIS — E55.9 VITAMIN D DEFICIENCY: Primary | ICD-10-CM

## 2021-06-08 DIAGNOSIS — Z13.29 THYROID DISORDER SCREEN: ICD-10-CM

## 2021-06-08 DIAGNOSIS — Z71.89 ENCOUNTER FOR HERB AND VITAMIN SUPPLEMENT MANAGEMENT: ICD-10-CM

## 2021-06-08 DIAGNOSIS — C50.011 MALIGNANT NEOPLASM OF NIPPLE OF RIGHT BREAST IN FEMALE, UNSPECIFIED ESTROGEN RECEPTOR STATUS (HCC): ICD-10-CM

## 2021-06-08 DIAGNOSIS — L98.9 SKIN LESION: ICD-10-CM

## 2021-06-08 DIAGNOSIS — M19.019 ARTHRITIS PAIN OF SHOULDER: ICD-10-CM

## 2021-06-08 LAB
AVERAGE GLUCOSE: 105 MG/DL (ref 70–126)
BASOPHILS # BLD: 0.9 %
BASOPHILS ABSOLUTE: 0 THOU/MM3 (ref 0–0.1)
CALCIUM SERPL-MCNC: 9.6 MG/DL (ref 8.5–10.5)
CHOLESTEROL, TOTAL: 291 MG/DL (ref 100–199)
EOSINOPHIL # BLD: 5 %
EOSINOPHILS ABSOLUTE: 0.2 THOU/MM3 (ref 0–0.4)
ERYTHROCYTE [DISTWIDTH] IN BLOOD BY AUTOMATED COUNT: 13.7 % (ref 11.5–14.5)
ERYTHROCYTE [DISTWIDTH] IN BLOOD BY AUTOMATED COUNT: 48.1 FL (ref 35–45)
ESTRADIOL LEVEL: 14.8 PG/ML
FOLLICLE STIMULATING HORMONE: 63.1 MIU/ML (ref 16–160)
HBA1C MFR BLD: 5.5 % (ref 4.4–6.4)
HCT VFR BLD CALC: 42.6 % (ref 37–47)
HDLC SERPL-MCNC: 100 MG/DL
HEMOGLOBIN: 13.3 GM/DL (ref 12–16)
IMMATURE GRANS (ABS): 0.01 THOU/MM3 (ref 0–0.07)
IMMATURE GRANULOCYTES: 0.2 %
LDL CHOLESTEROL CALCULATED: 178 MG/DL
LUTEINIZING HORMONE: 35.4 MIU/ML (ref 3.3–70.6)
LYMPHOCYTES # BLD: 31.7 %
LYMPHOCYTES ABSOLUTE: 1.5 THOU/MM3 (ref 1–4.8)
MAGNESIUM: 2.2 MG/DL (ref 1.6–2.4)
MCH RBC QN AUTO: 29.6 PG (ref 26–33)
MCHC RBC AUTO-ENTMCNC: 31.2 GM/DL (ref 32.2–35.5)
MCV RBC AUTO: 94.9 FL (ref 81–99)
MONOCYTES # BLD: 8 %
MONOCYTES ABSOLUTE: 0.4 THOU/MM3 (ref 0.4–1.3)
NUCLEATED RED BLOOD CELLS: 0 /100 WBC
PLATELET # BLD: 322 THOU/MM3 (ref 130–400)
PMV BLD AUTO: 10.1 FL (ref 9.4–12.4)
PROGESTERONE LEVEL: 0.11 NG/ML
PTH INTACT: 26.9 PG/ML (ref 15–65)
RBC # BLD: 4.49 MILL/MM3 (ref 4.2–5.4)
RHEUMATOID FACTOR: < 10 IU/ML (ref 0–13)
SEDIMENTATION RATE, ERYTHROCYTE: 25 MM/HR (ref 0–20)
SEG NEUTROPHILS: 54.2 %
SEGMENTED NEUTROPHILS ABSOLUTE COUNT: 2.5 THOU/MM3 (ref 1.8–7.7)
T3 TOTAL: 99 NG/DL (ref 72–181)
TRIGL SERPL-MCNC: 65 MG/DL (ref 0–199)
TSH SERPL DL<=0.05 MIU/L-ACNC: 1.94 UIU/ML (ref 0.4–4.2)
VITAMIN D 25-HYDROXY: 58 NG/ML (ref 30–100)
WBC # BLD: 4.6 THOU/MM3 (ref 4.8–10.8)

## 2021-06-08 PROCEDURE — 1090F PRES/ABSN URINE INCON ASSESS: CPT | Performed by: FAMILY MEDICINE

## 2021-06-08 PROCEDURE — 4040F PNEUMOC VAC/ADMIN/RCVD: CPT | Performed by: FAMILY MEDICINE

## 2021-06-08 PROCEDURE — 1123F ACP DISCUSS/DSCN MKR DOCD: CPT | Performed by: FAMILY MEDICINE

## 2021-06-08 PROCEDURE — 3017F COLORECTAL CA SCREEN DOC REV: CPT | Performed by: FAMILY MEDICINE

## 2021-06-08 PROCEDURE — 99213 OFFICE O/P EST LOW 20 MIN: CPT | Performed by: FAMILY MEDICINE

## 2021-06-08 PROCEDURE — G8420 CALC BMI NORM PARAMETERS: HCPCS | Performed by: FAMILY MEDICINE

## 2021-06-08 PROCEDURE — G8400 PT W/DXA NO RESULTS DOC: HCPCS | Performed by: FAMILY MEDICINE

## 2021-06-08 PROCEDURE — G8427 DOCREV CUR MEDS BY ELIG CLIN: HCPCS | Performed by: FAMILY MEDICINE

## 2021-06-08 PROCEDURE — 1036F TOBACCO NON-USER: CPT | Performed by: FAMILY MEDICINE

## 2021-06-08 SDOH — ECONOMIC STABILITY: FOOD INSECURITY: WITHIN THE PAST 12 MONTHS, YOU WORRIED THAT YOUR FOOD WOULD RUN OUT BEFORE YOU GOT MONEY TO BUY MORE.: NEVER TRUE

## 2021-06-08 SDOH — ECONOMIC STABILITY: FOOD INSECURITY: WITHIN THE PAST 12 MONTHS, THE FOOD YOU BOUGHT JUST DIDN'T LAST AND YOU DIDN'T HAVE MONEY TO GET MORE.: NEVER TRUE

## 2021-06-08 ASSESSMENT — PATIENT HEALTH QUESTIONNAIRE - PHQ9
2. FEELING DOWN, DEPRESSED OR HOPELESS: 0
SUM OF ALL RESPONSES TO PHQ QUESTIONS 1-9: 0
1. LITTLE INTEREST OR PLEASURE IN DOING THINGS: 0
SUM OF ALL RESPONSES TO PHQ QUESTIONS 1-9: 0
SUM OF ALL RESPONSES TO PHQ QUESTIONS 1-9: 0
SUM OF ALL RESPONSES TO PHQ9 QUESTIONS 1 & 2: 0

## 2021-06-08 ASSESSMENT — SOCIAL DETERMINANTS OF HEALTH (SDOH): HOW HARD IS IT FOR YOU TO PAY FOR THE VERY BASICS LIKE FOOD, HOUSING, MEDICAL CARE, AND HEATING?: NOT VERY HARD

## 2021-06-08 NOTE — PATIENT INSTRUCTIONS
Thank you   1. Thank you for trusting us with your healthcare needs. You may receive a survey regarding today's visit. It would help us out if you would take a few moments to provide your feedback. We value your input. 2. Please bring in ALL medications BOTTLES, including inhalers, herbal supplements, over the counter, prescribed & non-prescribed medicine. The office would like actual medication bottles and a list.   3. Please note our OFFICE POLICIES:   a. Prior to getting your labs drawn, please check with your insurance company for benefits and eligibility of lab services. Often, insurance companies cover certain tests for preventative visits only. It is patient's responsibility to see what is covered. b. We are unable to change a diagnosis after the test has been performed. c. Lab orders will not be re-printed. Please hold onto your original lab orders and take them to your lab to be completed. d. If you no show your scheduled appointment three times, you will be dismissed from this practice. e. Bertell Handy must be completed 24 hours prior to your schedule appointment. 4. If the list below has been completed, PLEASE FAX RECORDS TO OUR OFFICE @ [phone].  Once the records have been received we will update your records at our office:  Health Maintenance Due   Topic Date Due    Shingles Vaccine (1 of 2) Never done

## 2021-06-08 NOTE — PROGRESS NOTES
[address] [facility] [address]  Dept: [phone]  Dept Fax: [phone]  Loc: [address] Vinny Yen is a 77 y.o. White female. Mark Miller  presents to the [place] [address] clinic today for   Chief Complaint   Patient presents with    6 Month Follow-Up     no labs done    Pain     while walking    Other     sister in law just passed from als   , and;   No diagnosis found. I have reviewed [facility] medical, surgical and other pertinent history in detail, and have updated medication and allergy information in the computerized patient record. Clinical Care Team:     -Referring Provider for today's consult: self  -Primary Care Provider: MATTHEW Becker - CNP    Medical/Surgical History:   She  has a past medical history of Cancer ([facility] [address] Her  has a past surgical history that includes Breast surgery and Finger trigger release (Right, 2017). Family/Social History:     Her family history includes Breast Cancer in her sister; Cancer in her mother; Diabetes in her mother; High Blood Pressure in her brother and mother; No Known Problems in her brother, brother, and father. She  reports that she has never smoked. She has never used smokeless tobacco. She reports that she does not drink alcohol and does not use drugs.     Medications/Allergies/Immunizations:     Her current medication(s) include   Current Outpatient Medications:     Coenzyme Q10-Red Yeast Rice  MG CAPS, Take 1 capsule by mouth 2 times daily (before meals) Supper and bedtime, Disp: , Rfl:     letrozole (FEMARA) 2.5 MG tablet, Take 1 tablet by mouth daily, Disp: , Rfl: 3    NONFORMULARY, Take 2 capsules by mouth every evening Magtein 1 at supper and 1 at bedtime, Disp: , Rfl:     B Complex-Folic Acid (S-433 BALANCED TR PO), Take 1 tablet by mouth 2 times daily (before meals) [address] [facility] , [address]: , History   Administered Date(s) Administered    COVID-19, Moderna, PF, 100mcg/0.5mL 02/19/2021, 03/19/2021    Influenza Whole 12/05/2008    Influenza, High Dose (Fluzone 65 yrs and older) 12/07/2016, 10/24/2017, 10/11/2018, 08/17/2020    Influenza, High-dose, Maiavikash Linares, 65 yrs +, IM (Fluzone) 08/17/2020    Influenza, MDCK Quadv, with preserv IM (Flucelvax 4 yrs and older) 10/17/2019    Pneumococcal Conjugate 13-valent ([id number]) 11/05/2018    Pneumococcal Polysaccharide ([id number]) 11/03/2020    Tdap (Boostrix, Adacel) 10/12/2020        History of Present Illness:     Jenifer's had concerns including 6 Month Follow-Up (no labs done), Pain (while walking), and Other (sister in law just passed from als). Amberly Rene  presents to the [address] [place] [address] today for;   Chief Complaint   Patient presents with    6 Month Follow-Up     no labs done    Pain     while walking    Other     sister in law just passed from als   , abnormal labs follow up and these conditions as she  Is looking today for:     No diagnosis found. HPI    Subjective:     Review of Systems   Musculoskeletal: Positive for arthralgias, gait problem and joint swelling. Knee or right hip, getting into truck   All other systems reviewed and are negative. Objective:     /62 (Site: Right Upper Arm, Position: Sitting, Cuff Size: Medium Adult)   Pulse 79   Temp 97.9 °F (36.6 °C) (Oral)   Resp 12   Ht 5' 4.02\" (1.626 m)   Wt 121 lb (54.9 kg)   SpO2 98%   BMI 20.76 kg/m²   Physical Exam  Vitals and nursing note reviewed. Constitutional:       Appearance: Normal appearance. HENT:      Head: Normocephalic. Pulmonary:      Effort: Pulmonary effort is normal.   Neurological:      Mental Status: She is alert. Psychiatric:         Mood and Affect: Mood normal.         Thought Content:  Thought content normal.            Laboratory Data:   Lab results were searched in Care Everywhere and/or those brought by the pateint were reviewed today with Kerline Humphrey and she has a copy of their most recent labs to take home with them as noted below;       Imaging Data:   Imaging Data:       Assessment & Plan:       Impression:  No diagnosis found. Assessment and Plan:  After reviewing the patients chief complaints, reviewing their labfindings in great detail (with the patient and those accompanying them) which correlate to their chief complaints, symptoms, and or medical conditions; suggestions were made relating to changes in diet and or supplements which may improve the complaints and which will be reflected in their future lab findings; Chief Complaint   Patient presents with    6 Month Follow-Up     no labs done    Pain     while walking    Other     sister in law just passed from als   ;    Plans for the next visits:  - Abnormal and non-optimal Labs were ordered today to be repeated in the next 120-365 days to assess changes from adjustments in nutrition and or nutrients. - Patient instructed when having a blood draw to ask the  to divide their lab draws into multiple draws over several days if not feeling good at the time of the lab draw or if either prefers to do several smaller blood draws over several days  -Patient instructed to check with insurer before each lab draw and to go to the lab which the insurer directs them for the most cost effective lab draw with the least patient's cost  - Kerline Humphrey  will be scheduled subsequent to those results. Ld José Luis will bring in her drink, food, supplement log to her next visit    Chronic Problems Addressed on this Visit:                                   1.  Intensity of Service; Uncontrolled items at this visit; Chief Complaint   Patient presents with    6 Month Follow-Up     no labs done    Pain     while walking    Other     sister in law just passed from als   ; Improved items at this visit and Stable items were discussed at this visit;  2.  Patients food, pharmaceutical grade [employer]S omega 3 oil or triple-strength fish oil, and B-50/B-100 time-released B-complex by [address] will be for a time-limited trial to determine their individual effectiveness and safety in this patient. I also referred the patient to the NMCD: Nutrition, Metabolism, and Cardiovascular Diseases (SecuritiesCard.pl) and concerns about long-term use and hepatotoxicity of cinnamon and other nutrients. I suggested they frequently search nih.gov for the latest non-proprietary information on nutriceuticals as well as consider a subscription to [employer] for details on reviewed supplements, or at the least review the nutrient files at [facility] at [facility] [place], [address] bark, an insulin mimetic, reduces some High Carbohydrate Dietary Impacts. Methylhydroxychalcone polymers insulin-enhancing properties in fat cells are responsible for enhanced glucose uptake, inhibiting hepatic HMG-CoA reductase and lowers lipids. www.jacn. org/content/20/4/327.full     But cinnamon with additives such as Cinnamon Extract are not effective as insulin mimetics.  :eStoreDirectory.at     Nutrients for Start up from [employer] or [employer] for ease to get started now;  Dangelo Casillas has some useable products;  - Triple Strength Fish Oil, enteric coated  - Vit D-3 5000 IU gel caps  - Iron ferrous sulfate 325 mg tabs  - Centrum Silver look-a-like for most patients, or  - Centrum plain look-a-like if need iron    Local pharmacies or chains such as [employer], [employer], [employer], have:  - BrightLine pharmaceutical grade omega 3 is 90% EPA/DHA whereas most Triple strength fish oil are 75% EPA/DHA  - Triple Strength Fish Oil (enteric coated if available) or if not enteric coated, can take from freezer for less burps  - B-50 or B-100 released balanced B complex tabs by [address] at [facility] bark 500 mg (without Chromium or extracts)   some brands list 1000 mg / serving of 2 capsules,    some brands have 1000 mg caps with the undesireable chromium extract  - Calcium carbonate/citrate, magnesium oxide/citrate, Vit D-3 as 3-4 tabs/caps/serving     Some Local Brands may contain Zinc which is acceptable for the first bottle or two  - Magnesium oxide 250 mg tabs for those having < 2 bowel movements daily  - Magnesium citrate 200 mg if having > 2 bowel movements/day  - Centrum Silver or look-a-like for most patients, Centrum plain or look-a-like with iron  - Vitamin D-3 comes as 1,000 IU or 2,000 IU or 5,000 IU gel caps or Liquid drops but keep Vitamin D levels <50 but >40     Some brands containing or derived from soy oil or corn oil are OK if not allergic to soy  - Elemental Iron 65 mg tabs at bedtime is available over the counter if need more iron     Usually turns bowel movements grey, green, or black but not a concern  - Apricot Kernel Oil (by Now) for dry skin sensitive perineal or perianal area skin    Nutrients for ongoing use by Mail order for less expense from [employer] ;  - Strength Fish Oil , 240 Softgels Item #[id number]  -B-100 time released balanced B complex Item #258094  - Cinnamon bark 500 mg without Chromium or extract Item #934671  - Calcium carbonate 1000 mg, Magnesium oxide 500 mg, Vit D-3 400 IU Item #[id number]  - Magnesium oxide 500 mg tabs Item #[id number] if less than 2 bowel movements daily  - [employer] Seniors Item #974770 for most patients, One Daily Item #985869 with iron  - Vit D 3  1,000 Item #531696      2,000 IU Item #161030   Item #579036     Some brands containing orderived from soy oil or corn oil are OK if not allergic to soy    Nutrients for Special Needs by Mail order for less expense from www. puritan.com;  -Elemental Iron 65 mg tabs Item #114042 if need more iron for low iron on labs    Usually turns bowel movements grey, green or black but not a concern  - Time released Niacin 250 mg Item #354565 for cold in each food product or fruit variety. Avoid out of Season if not grown locally:   Melon, Nectarine, Papaya, Cherry, Passion fruit, Plum, Chestnuts, and Tomato. Avocado, Banana, Celery, Figs, and Kiwi always contain Latex-like protein. Whats in Season? Strawberries taste better in June than December because June is strawberry season so buy locally grown produce \"in season\" for the best flavor, cost, and less Latex. Locally grown produce not only tastes great but also requires little or no ethylene exposure in food distribution so has less latex content. Out of season: use canned, frozen, or dried since those are processed ripe and latex content is lower!!!     Month     [place] Locally Grown Produce  January, February, March: use canned, frozen or dried fruits since lower in latex  April: asparagus, radishes  May: asparagus, broccoli, green onions, greens, peas, radishes, rhubarb  Cat: asparagus, beets, beans, broccoli, cabbage, cantaloupe, carrots, green onions, greens, lettuce, onions, parsley, peas, radishes, rhubarb, strawberries, watermelons  July: beans, beets, blueberries, broccoli, cabbage, cantaloupe, carrots, cauliflower, celery, cucumbers, eggplant, grapes, green onions, greens, lettuce, onions, parsley, peas, peaches, bell peppers, potatoes, radishes, summer raspberries, squash, sweetcorn, tomatoes, turnips, watermelons  August: apples, beans, beets, blueberries, cabbage, cantaloupe, carrots, cauliflower, celery, cucumbers, eggplant, grapes, green onions, greens, lettuce, onions, parsley, peas, peaches, pears, bell peppers, potatoes, radishes, squash, sweet corn, tomatoes, turnips, watermelons  September: apples, beans, beets, blueberries, cabbage, cantaloupe, carrots, cauliflower, celery, cucumbers, eggplant, grapes, green onions, greens, lettuce, onions, parsley, peas, peaches, pears, bell peppers, plums, potatoes, pumpkins, radishes, fall red raspberries, squash, sweet corn, tomatoes, turnips, watermelons  October: apples, beets, broccoli, cabbage, carrots, cauliflower, celery, green onions, greens, lettuce, parsley, peas, pears, potatoes, pumpkins, radishes, fall red raspberries, squash, turnips  November: broccoli, cabbage, carrots, parsley, pears, peas  December: use canned, frozen or dried fruits since lower in latex    Upto half of latex-sensitive patients show allergic reactions to fruits (avocados, bananas, kiwifruits, papayas, peaches),   Annals of Allergy, 1994. These plants contain the same proteins that are allergens in latex. People with fruit allergies should warn physicians before undergoing procedures which may cause anaphylactic reaction if in contact with latex gloves. Some of the common foods with defined cross-reactivity to latex are avocado, banana, kiwi, chestnut, raw potato, tomato, stone fruits (e.g., peach, cherry), hazelnut, melons, celery, carrot, apple, pear, papaya, and almond. Foods with less well-defined cross-reactivity to latex are peanuts, peppers, citrus fruits, coconut, pineapple, dany, fig, passion fruit, Ugli fruit, and grape. This fruit/latex cross-reactivity is worsened by ethylene, a gas used to hasten commercial ripening. In nature, plants produce low levels of the hormone ethylene, which regulates germination, flowering, and ripening. Forced ripening by high ethylene concentrations, plants produce allergenic wound-repair proteins, which are similar to wound-repair proteins made during the tapping of rubber trees. Sensitive individuals who ingest the fruit get a higher dose and worse reaction. Some people may even first become sensitized to latex through fruit. Can food processing increase the concentrations of allergenic proteins? Latex-sensitized children (and adults) in [place] often experience allergic reactions after eating bananas ripened artificially with ethylene.  In the [place], food distribution centers treat unripe bananas and other

## 2021-06-09 LAB
C-REACTIVE PROTEIN, HIGH SENSITIVITY: 5.2 MG/L
HOMOCYSTEINE: 10.1 UMOL/L
THYROXINE (T4): 7 UG/DL (ref 4.5–10.9)

## 2021-06-10 LAB
DHEAS (DHEA SULFATE): 282 UG/DL (ref 10–90)
GLIADIN PEPTIDE IGG: < 0.4 U/ML

## 2021-06-11 LAB — ANA SCREEN: NORMAL

## 2021-06-13 LAB
DHEA UNCONJUGATED: 2.62 NG/ML (ref 0.63–4.7)
TESTOSTERONE, FREE W SHGB, FEMALES/CHILDREN: NORMAL

## 2021-09-12 ENCOUNTER — HOSPITAL ENCOUNTER (EMERGENCY)
Age: 76
Discharge: HOME | End: 2021-09-12
Payer: MEDICARE

## 2021-09-12 VITALS
DIASTOLIC BLOOD PRESSURE: 78 MMHG | TEMPERATURE: 98.3 F | HEART RATE: 76 BPM | OXYGEN SATURATION: 98 % | RESPIRATION RATE: 16 BRPM | SYSTOLIC BLOOD PRESSURE: 156 MMHG

## 2021-09-12 DIAGNOSIS — L30.9 DERMATITIS: Primary | ICD-10-CM

## 2021-09-12 PROCEDURE — 99213 OFFICE O/P EST LOW 20 MIN: CPT

## 2021-09-12 PROCEDURE — 99212 OFFICE O/P EST SF 10 MIN: CPT | Performed by: NURSE PRACTITIONER

## 2021-09-12 NOTE — ED PROVIDER NOTES
[facility]       CHIEF COMPLAINT       Chief Complaint   Patient presents with    Rash     on stomach       Nurses Notes reviewed and I agree except as noted in the HPI. HISTORY OF PRESENT ILLNESS   Rose Ayala is a 77 y.o. female who presents     Patient is present in urgent care today with concerns of rash to stomach that she has noticed within the last 6 to 7 hours. She states that she was cleaning out some bird nest, and feels that she may have had something scratch or rub up against her stomach that caused redness and irritation. She denies any fevers. She denies use of any over-the-counter medications. Denies any changes to diet, medicines, soaps, or detergents. She states she has had shingles in the past, however rash is present on left and right side. REVIEW OF SYSTEMS     Review of Systems   Constitutional: Negative for activity change, appetite change, chills, fatigue and fever. Gastrointestinal: Negative for abdominal pain. Musculoskeletal: Negative for myalgias. Skin: Positive for rash (irritation to abdomen). Negative for color change, pallor and wound. Neurological: Negative for dizziness and weakness. PAST MEDICAL HISTORY         Diagnosis Date    Cancer [facility])        SURGICALHISTORY     Patient  has a past surgical history that includes Breast surgery and Finger trigger release (Right, 2017).     CURRENT MEDICATIONS       Discharge Medication List as of 9/12/2021  5:16 PM      CONTINUE these medications which have NOT CHANGED    Details   Coenzyme Q10-Red Yeast Rice  MG CAPS Take 1 capsule by mouth 2 times daily (before meals) Supper and bedtimeHistorical Med      letrozole (FEMARA) 2.5 MG tablet Take 1 tablet by mouth daily, R-3Historical Med      !! NONFORMULARY Take 2 capsules by mouth every evening Magtein 1 at supper and 1 at bedtimeHistorical Med      B Complex-Folic Acid (V-574 BALANCED TR PO) Take 1 tablet by mouth 2 times daily (before meals) [address] [facility] at [address]      Potassium 99 MG TABS Take 1 capsule by mouth every other day Historical Med      diphenhydrAMINE-PE-APAP (ALLERGY RELIEF PLUS SINUS) 25-5-325 MG TABS Take by mouth as neededHistorical Med      vitamin D (CHOLECALCIFEROL) 5000 units CAPS capsule Take 1,000 Units by mouth every other day Historical Med      Cinnamon 500 MG CAPS Take 500 mg by mouth 3 times daily Historical Med      Ginger, Zingiber officinalis, (GINGER ROOT) 500 MG CAPS Take 1 capsule by mouth dailyHistorical Med      Turmeric Curcumin 500 MG CAPS Take 1 capsule by mouth dailyHistorical Med      melatonin 3 MG TABS tablet Take 5 mg by mouth nightly as neededHistorical Med      Garlic 306 MG CAPS Take 1 capsule by mouth 2 times dailyHistorical Med      Calcium Carbonate-Vit D-Min (CALCIUM 1200 PO) Take 1 tablet by mouth 2 times daily (before meals) Historical Med      Omega-3 Fatty Acids (FISH OIL) 1200 MG CAPS Take 2 capsules by mouth 3 times daily (before meals) [employer]S pharmaceutical grade when outHistorical Med      vitamin E 400 UNIT capsule Take 400 Units by mouth dailyHistorical Med      vitamin B-6 (PYRIDOXINE) 50 MG tablet Take 50 mg by mouth daily Historical Med      Cyanocobalamin (VITAMIN B-12) 3000 MCG SUBL Place 2 tablets under the tongue daily Historical Med      Biotin 2500 MCG CAPS Take 1 tablet by mouth every other day Historical Med      !! NONFORMULARY Take 1 capsule by mouth daily as needed Orega Resp usually used in OctoberHistorical Med      !! NONFORMULARY Take 1 capsule by mouth 2 times daily as needed Respiration Blend SP-3 usually used in OctoberHistorical Med      Olive Leaf Extract 250 MG CAPS Take 1 capsule by mouth 2 times dailyHistorical Med      Cetirizine HCl (ZYRTEC ALLERGY) 10 MG CAPS Take by mouth nightly Historical Med      Loratadine (CLARITIN) 10 MG CAPS Take 10 mg by mouth as needed Historical Med       !! - Potential duplicate medications found. Please discuss with provider. ALLERGIES     Patient is is allergic to gluten meal, orange oil, and pcn [penicillins]. Patients   Immunization History   Administered Date(s) Administered    COVID-19, Moderna, PF, 100mcg/0.5mL 02/19/2021, 03/19/2021    Influenza Whole 12/05/2008    Influenza, High Dose (Fluzone 65 yrs and older) 12/07/2016, 10/24/2017, 10/11/2018, 08/17/2020    Influenza, High-dose, Azalee Fees, 65 yrs +, IM (Fluzone) 08/17/2020    Influenza, MDCK Quadv, with preserv IM (Flucelvax 2 yrs and older) 10/17/2019    Pneumococcal Conjugate 13-valent ([id number]) 11/05/2018    Pneumococcal Polysaccharide (Njmjonnji21) 11/03/2020    Tdap (Boostrix, Adacel) 10/12/2020       FAMILY HISTORY     Patient's family history includes Breast Cancer in her sister; Cancer in her mother; Diabetes in her mother; High Blood Pressure in her brother and mother; No Known Problems in her brother, brother, and father. SOCIAL HISTORY     Patient  reports that she has never smoked. She has never used smokeless tobacco. She reports that she does not drink alcohol and does not use drugs. PHYSICAL EXAM     ED TRIAGE VITALS  BP: (!) 156/78, Temp: 98.3 °F (36.8 °C), Pulse: 76, Resp: 16, SpO2: 98 %,Estimated body mass index is 20.76 kg/m² as calculated from the following:    Height as of 6/8/21: 5' 4.02\" (1.626 m). Weight as of 6/8/21: 121 lb (54.9 kg). ,No LMP recorded. Patient is postmenopausal.    Physical Exam  Constitutional:       General: She is not in acute distress. Appearance: Normal appearance. She is not ill-appearing, toxic-appearing or diaphoretic. Pulmonary:      Effort: Pulmonary effort is normal. No respiratory distress. Musculoskeletal:         General: Normal range of motion. Skin:     General: Skin is warm. Coloration: Skin is not jaundiced or pale. Findings: Erythema (scattered scratches to abdomen, no bleeding or drainage) and rash present.  No bruising or lesion. Neurological:      General: No focal deficit present. Mental Status: She is alert and oriented to person, place, and time. Cranial Nerves: No cranial nerve deficit. Motor: No weakness. Coordination: Coordination normal.   Psychiatric:         Mood and Affect: Mood normal.         Behavior: Behavior normal.         Thought Content: Thought content normal.         Judgment: Judgment normal.         DIAGNOSTIC RESULTS     Labs:No results found for this visit on 09/12/21. IMAGING:    No orders to display     URGENT CARE COURSE:     Vitals:    09/12/21 1654   BP: (!) 156/78   Pulse: 76   Resp: 16   Temp: 98.3 °F (36.8 °C)   TempSrc: Temporal   SpO2: 98%       Medications - No data to display         PROCEDURES:  None    FINAL IMPRESSION      1. Dermatitis          DISPOSITION/ PLAN   Patient is discharged home with instructions to utilize over-the-counter cortisone ointment, for management of irritation due to scratching. Recommend continue to monitor for any signs or symptoms of infection such as drainage, pain, or fevers. Follow-up within 1 week if there is been no improvement.         PATIENT REFERRED TO:  MATTHEW Dumont CNP  [address] [place] [address]      DISCHARGE MEDICATIONS:  Discharge Medication List as of 9/12/2021  5:16 PM          Discharge Medication List as of 9/12/2021  5:16 PM          Discharge Medication List as of 9/12/2021  5:16 PM          MATTHEW Camarillo NP    (Please note that portions of this note were completed with a voice recognition program. Efforts were made to edit the dictations but occasionally words are mis-transcribed.)         MATTHEW Hoyt NP  09/17/21 4821

## 2021-09-13 ENCOUNTER — TELEPHONE (OUTPATIENT)
Dept: FAMILY MEDICINE CLINIC | Age: 76
End: 2021-09-13

## 2021-09-17 ASSESSMENT — ENCOUNTER SYMPTOMS
COLOR CHANGE: 0
ABDOMINAL PAIN: 0

## 2021-12-06 ENCOUNTER — TELEPHONE (OUTPATIENT)
Dept: FAMILY MEDICINE CLINIC | Age: 76
End: 2021-12-06

## 2021-12-06 DIAGNOSIS — M19.019 ARTHRITIS PAIN OF SHOULDER: ICD-10-CM

## 2021-12-06 DIAGNOSIS — K90.89 OTHER INTESTINAL MALABSORPTION: ICD-10-CM

## 2021-12-06 DIAGNOSIS — Z71.89 ENCOUNTER FOR HERB AND VITAMIN SUPPLEMENT MANAGEMENT: ICD-10-CM

## 2021-12-06 DIAGNOSIS — C50.011 MALIGNANT NEOPLASM OF NIPPLE OF RIGHT BREAST IN FEMALE, UNSPECIFIED ESTROGEN RECEPTOR STATUS (HCC): ICD-10-CM

## 2021-12-06 DIAGNOSIS — H04.123 DRY EYES: ICD-10-CM

## 2021-12-06 DIAGNOSIS — R53.83 OTHER FATIGUE: ICD-10-CM

## 2021-12-06 DIAGNOSIS — E55.9 VITAMIN D DEFICIENCY: Primary | ICD-10-CM

## 2021-12-06 DIAGNOSIS — E78.2 MIXED HYPERLIPIDEMIA: ICD-10-CM

## 2021-12-06 DIAGNOSIS — R73.09 LOW GLUCOSE LEVEL: ICD-10-CM

## 2021-12-06 DIAGNOSIS — R35.0 URINARY FREQUENCY: ICD-10-CM

## 2021-12-08 ENCOUNTER — NURSE ONLY (OUTPATIENT)
Dept: LAB | Age: 76
End: 2021-12-08

## 2021-12-08 DIAGNOSIS — R35.0 URINARY FREQUENCY: ICD-10-CM

## 2021-12-08 DIAGNOSIS — Z71.89 ENCOUNTER FOR HERB AND VITAMIN SUPPLEMENT MANAGEMENT: ICD-10-CM

## 2021-12-08 DIAGNOSIS — R73.09 LOW GLUCOSE LEVEL: ICD-10-CM

## 2021-12-08 DIAGNOSIS — R53.83 OTHER FATIGUE: ICD-10-CM

## 2021-12-08 DIAGNOSIS — C50.011 MALIGNANT NEOPLASM OF NIPPLE OF RIGHT BREAST IN FEMALE, UNSPECIFIED ESTROGEN RECEPTOR STATUS (HCC): ICD-10-CM

## 2021-12-08 DIAGNOSIS — E55.9 VITAMIN D DEFICIENCY: ICD-10-CM

## 2021-12-08 DIAGNOSIS — M19.019 ARTHRITIS PAIN OF SHOULDER: ICD-10-CM

## 2021-12-08 DIAGNOSIS — E78.2 MIXED HYPERLIPIDEMIA: ICD-10-CM

## 2021-12-08 DIAGNOSIS — H04.123 DRY EYES: ICD-10-CM

## 2021-12-08 DIAGNOSIS — K90.89 OTHER INTESTINAL MALABSORPTION: ICD-10-CM

## 2021-12-08 LAB
ALBUMIN SERPL-MCNC: 4.6 G/DL (ref 3.5–5.1)
ALP BLD-CCNC: 70 U/L (ref 38–126)
ALT SERPL-CCNC: 18 U/L (ref 11–66)
ANION GAP SERPL CALCULATED.3IONS-SCNC: 10 MEQ/L (ref 8–16)
AST SERPL-CCNC: 24 U/L (ref 5–40)
BASOPHILS # BLD: 1.4 %
BASOPHILS ABSOLUTE: 0.1 THOU/MM3 (ref 0–0.1)
BILIRUB SERPL-MCNC: 0.3 MG/DL (ref 0.3–1.2)
BILIRUBIN DIRECT: < 0.2 MG/DL (ref 0–0.3)
BUN BLDV-MCNC: 21 MG/DL (ref 7–22)
C-REACTIVE PROTEIN, HIGH SENSITIVITY: 1 MG/L
CALCIUM SERPL-MCNC: 9.7 MG/DL (ref 8.5–10.5)
CHLORIDE BLD-SCNC: 99 MEQ/L (ref 98–111)
CHOLESTEROL, TOTAL: 301 MG/DL (ref 100–199)
CO2: 28 MEQ/L (ref 23–33)
CREAT SERPL-MCNC: 0.8 MG/DL (ref 0.4–1.2)
EOSINOPHIL # BLD: 3.1 %
EOSINOPHILS ABSOLUTE: 0.1 THOU/MM3 (ref 0–0.4)
ERYTHROCYTE [DISTWIDTH] IN BLOOD BY AUTOMATED COUNT: 13.8 % (ref 11.5–14.5)
ERYTHROCYTE [DISTWIDTH] IN BLOOD BY AUTOMATED COUNT: 49.1 FL (ref 35–45)
FOLLICLE STIMULATING HORMONE: 69.4 MIU/ML (ref 16–160)
GFR SERPL CREATININE-BSD FRML MDRD: 70 ML/MIN/1.73M2
GLUCOSE BLD-MCNC: 93 MG/DL (ref 70–108)
HCT VFR BLD CALC: 42.2 % (ref 37–47)
HDLC SERPL-MCNC: 106 MG/DL
HEMOGLOBIN: 13.5 GM/DL (ref 12–16)
HOMOCYSTEINE: 7.7 UMOL/L
IMMATURE GRANS (ABS): 0 THOU/MM3 (ref 0–0.07)
IMMATURE GRANULOCYTES: 0 %
LDL CHOLESTEROL CALCULATED: 179 MG/DL
LUTEINIZING HORMONE: 34.7 MIU/ML (ref 3.3–70.6)
LYMPHOCYTES # BLD: 35.7 %
LYMPHOCYTES ABSOLUTE: 1.3 THOU/MM3 (ref 1–4.8)
MAGNESIUM: 2.2 MG/DL (ref 1.6–2.4)
MCH RBC QN AUTO: 30.7 PG (ref 26–33)
MCHC RBC AUTO-ENTMCNC: 32 GM/DL (ref 32.2–35.5)
MCV RBC AUTO: 95.9 FL (ref 81–99)
MONOCYTES # BLD: 11.1 %
MONOCYTES ABSOLUTE: 0.4 THOU/MM3 (ref 0.4–1.3)
NUCLEATED RED BLOOD CELLS: 0 /100 WBC
PLATELET # BLD: 275 THOU/MM3 (ref 130–400)
PMV BLD AUTO: 9.8 FL (ref 9.4–12.4)
POTASSIUM SERPL-SCNC: 4.2 MEQ/L (ref 3.5–5.2)
PROGESTERONE LEVEL: < 0.05 NG/ML
PTH INTACT: 19.9 PG/ML (ref 15–65)
RBC # BLD: 4.4 MILL/MM3 (ref 4.2–5.4)
RHEUMATOID FACTOR: < 10 IU/ML (ref 0–13)
SEDIMENTATION RATE, ERYTHROCYTE: 19 MM/HR (ref 0–20)
SEG NEUTROPHILS: 48.7 %
SEGMENTED NEUTROPHILS ABSOLUTE COUNT: 1.8 THOU/MM3 (ref 1.8–7.7)
SODIUM BLD-SCNC: 137 MEQ/L (ref 135–145)
T3 TOTAL: 87 NG/DL (ref 72–181)
TOTAL PROTEIN: 6.7 G/DL (ref 6.1–8)
TRIGL SERPL-MCNC: 79 MG/DL (ref 0–199)
TSH SERPL DL<=0.05 MIU/L-ACNC: 1.68 UIU/ML (ref 0.4–4.2)
VITAMIN D 25-HYDROXY: 51 NG/ML (ref 30–100)
WBC # BLD: 3.6 THOU/MM3 (ref 4.8–10.8)

## 2021-12-09 LAB — DHEAS (DHEA SULFATE): 192 UG/DL (ref 10–90)

## 2021-12-10 LAB
ANA SCREEN: NORMAL
GLIADIN PEPTIDE IGG: < 0.4 U/ML
THYROID PEROXIDASE ANTIBODY: < 4 IU/ML (ref 0–25)
THYROXINE (T4): 6.8 UG/DL (ref 4.5–10.9)
TISSUE TRANSGLUTAMINASE IGA: 0.2 U/ML

## 2021-12-11 LAB
ANTI-SMITH: 2 AU/ML (ref 0–40)
DHEA UNCONJUGATED: 2.34 NG/ML (ref 0.63–4.7)

## 2021-12-14 LAB — TESTOSTERONE, FREE W SHGB, FEMALES/CHILDREN: NORMAL

## 2021-12-16 ENCOUNTER — OFFICE VISIT (OUTPATIENT)
Dept: FAMILY MEDICINE CLINIC | Age: 76
End: 2021-12-16
Payer: MEDICARE

## 2021-12-16 VITALS
BODY MASS INDEX: 20.38 KG/M2 | HEART RATE: 75 BPM | SYSTOLIC BLOOD PRESSURE: 136 MMHG | OXYGEN SATURATION: 98 % | RESPIRATION RATE: 10 BRPM | TEMPERATURE: 96.6 F | WEIGHT: 119.4 LBS | HEIGHT: 64 IN | DIASTOLIC BLOOD PRESSURE: 72 MMHG

## 2021-12-16 DIAGNOSIS — E78.41 ELEVATED LIPOPROTEIN(A): ICD-10-CM

## 2021-12-16 DIAGNOSIS — E55.9 VITAMIN D DEFICIENCY: ICD-10-CM

## 2021-12-16 DIAGNOSIS — Z71.89 ENCOUNTER FOR HERB AND VITAMIN SUPPLEMENT MANAGEMENT: ICD-10-CM

## 2021-12-16 DIAGNOSIS — R35.0 URINARY FREQUENCY: ICD-10-CM

## 2021-12-16 DIAGNOSIS — K90.89 OTHER INTESTINAL MALABSORPTION: Primary | ICD-10-CM

## 2021-12-16 DIAGNOSIS — Z13.29 THYROID DISORDER SCREEN: ICD-10-CM

## 2021-12-16 DIAGNOSIS — C50.011 MALIGNANT NEOPLASM OF NIPPLE OF RIGHT BREAST IN FEMALE, UNSPECIFIED ESTROGEN RECEPTOR STATUS (HCC): ICD-10-CM

## 2021-12-16 DIAGNOSIS — R53.83 OTHER FATIGUE: ICD-10-CM

## 2021-12-16 DIAGNOSIS — E78.2 MIXED HYPERLIPIDEMIA: ICD-10-CM

## 2021-12-16 DIAGNOSIS — R73.09 LOW GLUCOSE LEVEL: ICD-10-CM

## 2021-12-16 PROCEDURE — 1036F TOBACCO NON-USER: CPT | Performed by: FAMILY MEDICINE

## 2021-12-16 PROCEDURE — G8484 FLU IMMUNIZE NO ADMIN: HCPCS | Performed by: FAMILY MEDICINE

## 2021-12-16 PROCEDURE — 3017F COLORECTAL CA SCREEN DOC REV: CPT | Performed by: FAMILY MEDICINE

## 2021-12-16 PROCEDURE — 1123F ACP DISCUSS/DSCN MKR DOCD: CPT | Performed by: FAMILY MEDICINE

## 2021-12-16 PROCEDURE — 4040F PNEUMOC VAC/ADMIN/RCVD: CPT | Performed by: FAMILY MEDICINE

## 2021-12-16 PROCEDURE — 1090F PRES/ABSN URINE INCON ASSESS: CPT | Performed by: FAMILY MEDICINE

## 2021-12-16 PROCEDURE — G8427 DOCREV CUR MEDS BY ELIG CLIN: HCPCS | Performed by: FAMILY MEDICINE

## 2021-12-16 PROCEDURE — G8400 PT W/DXA NO RESULTS DOC: HCPCS | Performed by: FAMILY MEDICINE

## 2021-12-16 PROCEDURE — 99214 OFFICE O/P EST MOD 30 MIN: CPT | Performed by: FAMILY MEDICINE

## 2021-12-16 PROCEDURE — G8420 CALC BMI NORM PARAMETERS: HCPCS | Performed by: FAMILY MEDICINE

## 2021-12-16 NOTE — PATIENT INSTRUCTIONS
Thank you   1. Thank you for trusting us with your healthcare needs. You may receive a survey regarding today's visit. It would help us out if you would take a few moments to provide your feedback. We value your input. 2. Please bring in ALL medications BOTTLES, including inhalers, herbal supplements, over the counter, prescribed & non-prescribed medicine. The office would like actual medication bottles and a list.   3. Please note our OFFICE POLICIES:   a. Prior to getting your labs drawn, please check with your insurance company for benefits and eligibility of lab services. Often, insurance companies cover certain tests for preventative visits only. It is patient's responsibility to see what is covered. b. We are unable to change a diagnosis after the test has been performed. c. Lab orders will not be re-printed. Please hold onto your original lab orders and take them to your lab to be completed. d. If you no show your scheduled appointment three times, you will be dismissed from this practice. e. Fadia Bach must be completed 24 hours prior to your schedule appointment. 4. If the list below has been completed, PLEASE FAX RECORDS TO OUR OFFICE @ [phone].  Once the records have been received we will update your records at our office:  Health Maintenance Due   Topic Date Due    Shingles Vaccine (1 of 2) Never done   [employer] Visit (AWV)  11/04/2021

## 2021-12-16 NOTE — PROGRESS NOTES
[address] [facility] Bakari VELÁSQUEZ [address] [address]  Dept: [phone]  Dept Fax: [phone]  Loc: [address] is a 77 y.o. White female. Paulino Pan  presents to the [place] [address] clinic today for   Chief Complaint   Patient presents with    6 Month Follow-Up    Arthritis     hands, take more msm?   , and;   1. Other intestinal malabsorption    2. Vitamin D deficiency    3. Mixed hyperlipidemia    4. Other fatigue    5. 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 Encounter for herb and vitamin supplement management    7. Urinary frequency    8. Low glucose level    9. Thyroid disorder screen    10. Elevated lipoprotein(a)           I have reviewed [facility] medical, surgical and other pertinent history in detail, and have updated medication and allergy information in the computerized patient record. Clinical Care Team:     -Referring Provider for today's consult: self  -Primary Care Provider: MATTHEW Sampson - [facility]    Medical/Surgical History:   She  has a past medical history of Cancer [facility]). Her  has a past surgical history that includes Breast surgery and Finger trigger release (Right, 2017). Family/Social History:     Her family history includes Breast Cancer in her sister; Cancer in her mother; Diabetes in her mother; High Blood Pressure in her brother and mother; No Known Problems in her brother, brother, and father. She  reports that she has never smoked. She has never used smokeless tobacco. She reports that she does not drink alcohol and does not use drugs.     Medications/Allergies/Immunizations:     Her current medication(s) include   Current Outpatient Medications:     NONFORMULARY, Take 1 capsule by mouth every morning (before breakfast) [employer] [facility] phosphatidyl serine + bacopa monnieri, Disp: , Rfl: capsule by mouth 2 times daily as needed Respiration Blend SP-3 usually used in October, Disp: , Rfl:     Olive Leaf Extract 250 MG CAPS, Take 1 capsule by mouth 2 times daily, Disp: , Rfl:     Cetirizine HCl (ZYRTEC ALLERGY) 10 MG CAPS, Take by mouth nightly , Disp: , Rfl:     Loratadine (CLARITIN) 10 MG CAPS, Take 10 mg by mouth as needed , Disp: , Rfl:   Allergies: Gluten meal, Orange oil, and Pcn [penicillins]  Immunizations:   Immunization History   Administered Date(s) Administered    COVID-19, Moderna, Primary or Immunocompromised, PF, 100mcg/0.5mL 02/19/2021, 03/19/2021    COVID-19, Pfizer, PF, 30mcg/0.3mL 11/20/2021    Influenza Whole 12/05/2008    Influenza, High Dose (Fluzone 65 yrs and older) 12/07/2016, 10/24/2017, 10/11/2018, 08/17/2020    Influenza, High-dose, Quadv, 65 yrs +, IM (Fluzone) 08/17/2020    Influenza, MDCK Quadv, with preserv IM (Flucelvax 2 yrs and older) 10/17/2019    Pneumococcal Conjugate 13-valent ([id number]) 11/05/2018    Pneumococcal Polysaccharide ([id number]) 11/03/2020    Tdap (Boostrix, Adacel) 10/12/2020        History of Present Illness:     Jenifer's had concerns including 6 Month Follow-Up and Arthritis (hands, take more msm? ). Silvia Cushing  presents to the [address] [place] [address] today for;   Chief Complaint   Patient presents with    6 Month Follow-Up    Arthritis     hands, take more msm?   , abnormal labs follow up and these conditions as she  Is looking today for:     1. Other intestinal malabsorption    2. Vitamin D deficiency    3. Mixed hyperlipidemia    4. Other fatigue    5. Malignant neoplasm of nipple of right breast in female, unspecified estrogen receptor status ([facility] [address]    6. Encounter for herb and vitamin supplement management    7. Urinary frequency    8. Low glucose level    9. Thyroid disorder screen    10.  Elevated lipoprotein(a)       HPI    Subjective:     Review of Systems   All other systems reviewed and are negative. Objective:     /72 (Site: Right Upper Arm, Position: Sitting, Cuff Size: Medium Adult)   Pulse 75   Temp 96.6 °F (35.9 °C) (Temporal)   Resp 10   Ht 5' 4.02\" (1.626 m)   Wt 119 lb 6.4 oz (54.2 kg)   SpO2 98%   BMI 20.48 kg/m²   Physical Exam  Vitals and nursing note reviewed. Constitutional:       Appearance: Normal appearance. HENT:      Head: Normocephalic. Pulmonary:      Effort: Pulmonary effort is normal.   Neurological:      Mental Status: She is alert. Psychiatric:         Mood and Affect: Mood normal.         Thought Content: Thought content normal.            Laboratory Data:   Lab results were searched in Care Everywhere and/or those brought by the pateint were reviewed today with Bisi Edwards and she has a copy of their most recent labs to take home with them as noted below;       Imaging Data:   Imaging Data:       Assessment & Plan:       Impression:  1. Other intestinal malabsorption    2. Vitamin D deficiency    3. Mixed hyperlipidemia    4. Other fatigue    5. Malignant neoplasm of nipple of right breast in female, unspecified estrogen receptor status ([facility] [address]    6. Encounter for herb and vitamin supplement management    7. Urinary frequency    8. Low glucose level    9. Thyroid disorder screen    10. Elevated lipoprotein(a)       Assessment and Plan:  After reviewing the patients chief complaints, reviewing their labfindings in great detail (with the patient and those accompanying them) which correlate to their chief complaints, symptoms, and or medical conditions; suggestions were made relating to changes in diet and or supplements which may improve the complaints and which will be reflected in their future lab findings;   Chief Complaint   Patient presents with    6 Month Follow-Up    Arthritis     hands, take more msm?   ;    Plans for the next visits:  - Abnormal and non-optimal Labs were ordered today to be repeated in the next 120-365 days to assess changes from adjustments in nutrition and or nutrients. - Patient instructed when having a blood draw to ask the  to divide their lab draws into multiple draws over several days if not feeling good at the time of the lab draw or if either prefers to do several smaller blood draws over several days  -Patient instructed to check with insurer before each lab draw and to go to the lab which the insurer directs them for the most cost effective lab draw with the least patient's cost  - Ashish Eid  will be scheduled subsequent to those results. Toni Orosco will bring in her drink, food, supplement log to her next visit    Chronic Problems Addressed on this Visit:                                   1.  Intensity of Service; Uncontrolled items at this visit; Chief Complaint   Patient presents with    6 Month Follow-Up    Arthritis     hands, take more msm?   ;              Improved items at this visit and Stable items were discussed at this visit;  2. Patients food, drinks, supplements and symptoms were reviewed with the patient,       - Ashish Eid will bring food, drink, supplements and symptoms log to next visit for inclusion in their record      - 75 better food list reviewed & given to patient with the omega 6 food list to avoid      - The 52 Latex foods list was reviewed and given to the patients with the information on carrageenan         - Gluten in corn and oats abstracts sheet reviewed and given to the patient today   3.    Greater than 35 minutes time was spent with the patient face to face on this visit; of which >50% was for counseling and coordination of care, as well as the time spent before and after the visit reviewing the chart, documenting the encounter, reviewing labs,reports, NIH listed studies, making phone calls, etc.      Patients food and drinks were reviewed with the patient,   - They will bring a food drink symptom log to future visits for inclusion in their record    - 75 better food list reviewed & given to patient along with the omega 6 food list to avoid      - Gluten in corn and oats abstracts sheet reviewed and given to the patient today    - 23 Foods containing Latex-like proteins was reviewed and copy to be taken if desired     - Nutrient Supplements list provided and copyto be taken if desired    - [employer]. [employer] web site offered to patient to review at their convenience by staff with login information    Note:  I have discussed with the patient that with all nutraceuticals, there is often mixed data and emerging research which needs to be monitored; as well as an array of NIH fact sheets on nutrients and supplements, available at www.nih,issue plus [employer]. [employer] Security plus www.Veenomei,org. If I have recommended cinnamon at the request of this patient to assist them in control of their blood sugar, triglyceride, and/or weight issues. I discussed that the patient's clinical use of cinnamon bark, calcium, magnesium, Vitamin D, and pharmaceutical grade CVS omega 3 oil or triple-strength fish oil, and B-50/B-100 time-released B-complex by [address] [address] [facility] will be for a time-limited trial to determine their individual effectiveness and safety in this patient. I also referred the patient to the NMCD: Nutrition, Metabolism, and Cardiovascular Diseases (SecuritiesCard.pl) and concerns about long-term use and hepatotoxicity of cinnamon and other nutrients. I suggested they frequently search nih.gov for the latest non-proprietary information on nutriceuticals as well as consider a subscription to [employer] for details on reviewed supplements, or at the least review the nutrient files at [facility], [address] bark, an insulin mimetic, reduces some High Carbohydrate Dietary Impacts.   Methylhydroxychalcone polymers insulin-enhancing properties in fat cells are responsible for enhanced glucose uptake, inhibiting hepatic HMG-CoA reductase and lowers lipids. www.jacn. org/content/20/4/327.full     But cinnamon with additives such as Cinnamon Extract are not effective as insulin mimetics.  :eStoreDirectory.at     Nutrients for Start up from [employer] or [employer] for ease to get started now;  Dangelo Casillas has some useable products;  - Triple Strength Fish Oil, enteric coated  - Vit D-3 5000 IU gel caps  - Iron ferrous sulfate 325 mg tabs  - Centrum Silver look-a-like for most patients, or  - Centrum plain look-a-like if need iron    Local pharmacies or chains such as [employer], have:  - Independent Space pharmaceutical grade omega 3 is 90% EPA/DHA whereas most Triple strength fish oil are 75% EPA/DHA  - Triple Strength Fish Oil (enteric coated if available) or if not enteric coated, can take from freezer for less burps  - B-50 or B-100 released balanced B complex tabs by [address] [address] [facility] bark 500 mg (without Chromium or extracts)   some brands list 1000 mg / serving of 2 capsules,    some brands have 1000 mg caps with the undesireable chromium extract  - Calcium carbonate/citrate, magnesium oxide/citrate, Vit D-3 as 3-4 tabs/caps/serving     Some Local Brands may contain Zinc which is acceptable for the first bottle or two  - Magnesium oxide 250 mg tabs for those having < 2 bowel movements daily  - Magnesium citrate 200 mg if having > 2 bowel movements/day  - Centrum Silver or look-a-like for most patients, Centrum plain or look-a-like with iron  - Vitamin D-3 comes as 1,000 IU or 2,000 IU or 5,000 IU gel caps or Liquid drops but keep Vitamin D levels <50 but >40     Some brands containing or derived from soy oil or corn oil are OK if not allergic to soy  - Elemental Iron 65 mg tabs at bedtime is available over the counter if need more iron     Usually turns bowel movements grey, green, or black but not a concern  - Apricot Kernel Oil (by Now) for dry skin sensitive perineal or perianal area skin    Nutrients for ongoing use by Mail order for less expense from [employer]. [employer] ;  - Strength Fish Oil , 240 Softgels Item #283426  -B-100 time released balanced B complex Item #624393  - Cinnamon bark 500 mg without Chromium or extract Item #822953  - Calcium carbonate 1000 mg, Magnesium oxide 500 mg, Vit D-3 400 IU Item #620257  - Magnesium oxide 500 mg tabs Item #982567 if less than 2 bowel movements daily  - ABC Seniors Item #464599 for most patients, One Daily Item #427669 with iron  - Vit D 3  1,000 Item #731136      2,000 IU Item #406878   Item #815662     Some brands containing orderived from soy oil or corn oil are OK if not allergic to soy    Nutrients for Special Needs by Mail order for less expense from www. puritan.com;  -Elemental Iron 65 mg tabs Item #083649 if need more iron for low iron on labs    Usually turns bowel movements grey, green or black but not a concern  - Time released Niacin 250 mg Item #791362 for cold intolerance, low libido or impotence  - DHEA 50 mg Item #693976 for improving DHEA levels on labs if having Fatigue    If stools too loose substitute for your Magnesium oxide using;   Magnesium citrate 200 mg tabs (NOT liquid) at [employer]   Magnesium gluconate 550 mg by Db Eugene at asap54.com or [facility]  Magnesium chloride foot soaks or body sprays  www.Habbits. Cognuse   Magnesium chloride flakes 14.99 Item #: [id number] if back-ordered, get spray  Magnesium threonate, Magtein also helps mental clarity and sleep    Food Drink Symptom Log;  I asked this patient to track these items and any other symptoms on their list on a weekly basis to documenttheir progress or lack of same.  This can be done on the symptom tracking sheet I gave them at today's visit but looks like this:                                                      Rate on scale of 0-10 with zero = not noticeable  Symptom:                            Week 1               2                 3                 4               Etc Hair loss    Foot cramps    Paresthesia    Aches    IBS (irritable bowel)    Constipation    Diarrhea  Nocturia (up to bathroom at night)    Fatigue/Energy level  Stress      On the other side of the sheet they can track their food, drink, environment, activity, symptoms etc      Avoiding Latex-like proteins in my foods; Avocados, Bananas, Celery, Figs & Kiwi proteins have latex-like proteins to inflame our immune systems, plus 47 more foods  How Can I Have A Latex Allergy? Eating foods with latex-like protein exposes us to latex allergies. Our body cannot tell the differencebetween these latex-like proteins and latex from rubber products since many people are allergic to fruit, vegetables and latex. Read labels on pre-packaged foods. This list to avoid is only a guide if you are known allergicto latex or have a latex rash on your chin, cheeks and lines on your neck and chest. The amount of latex is different in each food product or fruit variety. Avoid out of Season if not grown locally:   Melon, Nectarine, Papaya, Cherry, Passion fruit, Plum, Chestnuts, and Tomato. Avocado, Banana, Celery, Figs, and Kiwi always contain Latex-like protein. Whats in Season? Strawberries taste better in June than December because June is strawberry season so buy locally grown produce \"in season\" for the best flavor, cost, and less Latex. Locally grown produce not only tastes great but also requires little or no ethylene exposure in food distribution so has less latex content. Out of season: use canned, frozen, or dried since those are processed ripe and latex content is lower!!!     Month     [place] Locally Grown Produce  January, February, March: use canned, frozen or dried fruits since lower in latex  April: asparagus, radishes  May: asparagus, broccoli, green onions, greens, peas, radishes, rhubarb  June: asparagus, beets, beans, broccoli, cabbage, cantaloupe, carrots, green onions, greens, lettuce, onions, parsley, peas, radishes, rhubarb, strawberries, watermelons  July: beans, beets, blueberries, broccoli, cabbage, cantaloupe, carrots, cauliflower, celery, cucumbers, eggplant, grapes, green onions, greens, lettuce, onions, parsley, peas, peaches, bell peppers, potatoes, radishes, summer raspberries, squash, sweetcorn, tomatoes, turnips, watermelons  August: apples, beans, beets, blueberries, cabbage, cantaloupe, carrots, cauliflower, celery, cucumbers, eggplant, grapes, green onions, greens, lettuce, onions, parsley, peas, peaches, pears, bell peppers, potatoes, radishes, squash, sweet corn, tomatoes, turnips, watermelons  September: apples, beans, beets, blueberries, cabbage, cantaloupe, carrots, cauliflower, celery, cucumbers, eggplant, grapes, green onions, greens, lettuce, onions, parsley, peas, peaches, pears, bell peppers, plums, potatoes, pumpkins, radishes, fall red raspberries, squash, sweet corn, tomatoes, turnips, watermelons  October: apples, beets, broccoli, cabbage, carrots, cauliflower, celery, green onions, greens, lettuce, parsley, peas, pears, potatoes, pumpkins, radishes, fall red raspberries, squash, turnips  November: broccoli, cabbage, carrots, parsley, pears, peas  December: use canned, frozen or dried fruits since lower in latex    Upto half of latex-sensitive patients show allergic reactions to fruits (avocados, bananas, kiwifruits, papayas, peaches),   Annals of Allergy, 1994. These plants contain the same proteins that are allergens in latex. People with fruit allergies should warn physicians before undergoing procedures which may cause anaphylactic reaction if in contact with latex gloves. Some of the common foods with defined cross-reactivity to latex are avocado, banana, kiwi, chestnut, raw potato, tomato, stone fruits (e.g., peach, cherry), hazelnut, melons, celery, carrot, apple, pear, papaya, and almond.   Foods with less well-defined cross-reactivity to latex are peanuts, peppers, citrus fruits, coconut, pineapple, dany, fig, passion fruit, Ugli fruit, and grape. This fruit/latex cross-reactivity is worsened by ethylene, a gas used to hasten commercial ripening. In nature, plants produce low levels of the hormone ethylene, which regulates germination, flowering, and ripening. Forced ripening by high ethylene concentrations, plants produce allergenic wound-repair proteins, which are similar to wound-repair proteins made during the tapping of rubber trees. Sensitive individuals who ingest the fruit get a higher dose and worse reaction. Some people may even first become sensitized to latex through fruit. Can food processing increase the concentrations of allergenic proteins? Latex-sensitized children (and adults) in [place] often experience allergic reactions after eating bananas ripened artificially with ethylene. In the [place], food distribution centers treat unripe bananas and other produce with ethylene to ripen; not commonly done in [facility] since fruit is tree-ripened there. Does treatment of food with ethylene induce banana proteins that cross-react with latex? (Sarai et al.)    References:   Latex in Foods Allergy, http://ehp.niehs.nih.gov/members/2003/5811/5811.html    Search web for [employer] in Season \" for where you live or are at the time you food shop   Management of Latex, ://medicalcenter. osu.edu/  search for nih, latex-like proteins in foods

## 2021-12-29 ENCOUNTER — HOSPITAL ENCOUNTER (OUTPATIENT)
Dept: GENERAL RADIOLOGY | Age: 76
Discharge: HOME | End: 2021-12-29
Payer: MEDICARE

## 2021-12-29 ENCOUNTER — HOSPITAL ENCOUNTER (OUTPATIENT)
Age: 76
Discharge: HOME | End: 2021-12-29
Payer: MEDICARE

## 2021-12-29 DIAGNOSIS — M25.551 RIGHT HIP PAIN: ICD-10-CM

## 2021-12-29 PROCEDURE — 73502 X-RAY EXAM HIP UNI 2-3 VIEWS: CPT

## 2022-02-14 ENCOUNTER — TELEPHONE (OUTPATIENT)
Dept: FAMILY MEDICINE CLINIC | Age: 77
End: 2022-02-14

## 2022-02-14 NOTE — TELEPHONE ENCOUNTER
----- Message from [place] sent at 2/12/2022 11:17 AM EST -----  Subject: Appointment Request    Reason for Call: Urgent Adult Urinary Problem    QUESTIONS  Type of Appointment? Established Patient  Reason for appointment request? No appointments available during search  Additional Information for Provider? Appt came up urgent for urinary tract   infection symptoms. Transferred to [facility].   ---------------------------------------------------------------------------  --------------  [address]  What is the best way for the office to contact you? OK to leave message on   voicemail  Preferred Call Back Phone Number?  [phone]  ---------------------------------------------------------------------------  --------------  SCRIPT ANSWERS

## 2022-02-24 ENCOUNTER — HOSPITAL ENCOUNTER (EMERGENCY)
Age: 77
Discharge: HOME | End: 2022-02-24
Payer: MEDICARE

## 2022-02-24 VITALS
SYSTOLIC BLOOD PRESSURE: 117 MMHG | DIASTOLIC BLOOD PRESSURE: 58 MMHG | BODY MASS INDEX: 19.97 KG/M2 | HEIGHT: 64 IN | WEIGHT: 117 LBS | HEART RATE: 77 BPM | TEMPERATURE: 98 F | RESPIRATION RATE: 18 BRPM | OXYGEN SATURATION: 93 %

## 2022-02-24 DIAGNOSIS — N30.01 ACUTE CYSTITIS WITH HEMATURIA: Primary | ICD-10-CM

## 2022-02-24 LAB
BILIRUBIN URINE: NEGATIVE
BLOOD, URINE: ABNORMAL
CHARACTER, URINE: CLEAR
COLOR: YELLOW
GLUCOSE URINE: NEGATIVE MG/DL
KETONES, URINE: 15
LEUKOCYTE ESTERASE, URINE: ABNORMAL
NITRITE, URINE: POSITIVE
PH UA: 6 (ref 5–9)
PROTEIN UA: 30 MG/DL
SPECIFIC GRAVITY UA: 1.01 (ref 1–1.03)
UROBILINOGEN, URINE: 0.2 EU/DL (ref 0.2–1)

## 2022-02-24 PROCEDURE — 87086 URINE CULTURE/COLONY COUNT: CPT

## 2022-02-24 PROCEDURE — 99213 OFFICE O/P EST LOW 20 MIN: CPT | Performed by: NURSE PRACTITIONER

## 2022-02-24 PROCEDURE — 87077 CULTURE AEROBIC IDENTIFY: CPT

## 2022-02-24 PROCEDURE — 99213 OFFICE O/P EST LOW 20 MIN: CPT

## 2022-02-24 PROCEDURE — 81003 URINALYSIS AUTO W/O SCOPE: CPT

## 2022-02-24 PROCEDURE — 87186 SC STD MICRODIL/AGAR DIL: CPT

## 2022-02-24 RX ORDER — NITROFURANTOIN 25; 75 MG/1; MG/1
100 CAPSULE ORAL 2 TIMES DAILY
Qty: 14 CAPSULE | Refills: 0 | Status: SHIPPED | OUTPATIENT
Start: 2022-02-24 | End: 2022-03-03

## 2022-02-24 ASSESSMENT — PAIN DESCRIPTION - ONSET: ONSET: ON-GOING

## 2022-02-24 ASSESSMENT — PAIN DESCRIPTION - DESCRIPTORS: DESCRIPTORS: ACHING

## 2022-02-24 ASSESSMENT — ENCOUNTER SYMPTOMS
DIARRHEA: 0
COUGH: 0
NAUSEA: 0
SHORTNESS OF BREATH: 0
RHINORRHEA: 0
SORE THROAT: 0
VOMITING: 0
CHEST TIGHTNESS: 0

## 2022-02-24 ASSESSMENT — PAIN DESCRIPTION - FREQUENCY: FREQUENCY: CONTINUOUS

## 2022-02-24 ASSESSMENT — PAIN DESCRIPTION - LOCATION: LOCATION: FLANK

## 2022-02-24 ASSESSMENT — PAIN DESCRIPTION - PROGRESSION: CLINICAL_PROGRESSION: NOT CHANGED

## 2022-02-24 ASSESSMENT — PAIN - FUNCTIONAL ASSESSMENT: PAIN_FUNCTIONAL_ASSESSMENT: 0-10

## 2022-02-24 ASSESSMENT — PAIN DESCRIPTION - PAIN TYPE: TYPE: ACUTE PAIN

## 2022-02-24 ASSESSMENT — PAIN DESCRIPTION - ORIENTATION: ORIENTATION: RIGHT

## 2022-02-24 ASSESSMENT — PAIN SCALES - GENERAL: PAINLEVEL_OUTOF10: 2

## 2022-02-24 NOTE — ED TRIAGE NOTES
To room with c/o not feeling good that stated last night. Fatigue. Right sided back pain. She reports having a UTI for the past 2 weeks. She has not been tx with ATB. Her doctor just told her to drink water.

## 2022-02-24 NOTE — ED PROVIDER NOTES
[facility]  Urgent Care Encounter       CHIEF COMPLAINT       Chief Complaint   Patient presents with    Back Pain       Nurses Notes reviewed and I agree except as noted in the HPI. HISTORY OF PRESENT ILLNESS   Maria Luz Akers is a 69 y.o. female who presents to the [facility] urgent care for evaluation of right flank pain and urinary frequency. She reports her symptoms initially started roughly 2 weeks ago. She reports she discussed this with her PCP who recommend increasing oral fluids along with cranberry tablets. She reports the flank pain started 1 to 2 days ago. She reports the pain is constant and dull. She denies aggravating or alleviating factors. She denies gross hematuria. She denies fever or chills. She reports the urinary frequency along with mild urgency. She denies history of kidney stones. The history is provided by the patient. No  was used. REVIEW OF SYSTEMS     Review of Systems   Constitutional: Negative for activity change, appetite change, chills, fatigue and fever. HENT: Negative for ear discharge, ear pain, rhinorrhea and sore throat. Respiratory: Negative for cough, chest tightness and shortness of breath. Cardiovascular: Negative for chest pain. Gastrointestinal: Negative for diarrhea, nausea and vomiting. Genitourinary: Positive for flank pain, frequency and urgency. Negative for dysuria. Skin: Negative for rash. Allergic/Immunologic: Negative for environmental allergies and food allergies. Neurological: Negative for dizziness and headaches. PAST MEDICAL HISTORY         Diagnosis Date    Cancer [facility])        SURGICALHISTORY     Patient  has a past surgical history that includes Breast surgery and Finger trigger release (Right, 2017).     CURRENT MEDICATIONS       Discharge Medication List as of 2/24/2022  9:55 AM      CONTINUE these medications which have NOT CHANGED    Details   !! NONFORMULARY Take 1 capsule by mouth every morning (before breakfast) [facility] - FAHEEM DURANT phosphatidyl serine + bacopa monnieriHistorical Med      Coenzyme Q10-Red Yeast Rice  MG CAPS Take 1 capsule by mouth 2 times daily (before meals) Supper and bedtimeHistorical Med      letrozole (FEMARA) 2.5 MG tablet Take 1 tablet by mouth daily, R-3Historical Med      B Complex-Folic Acid (V-348 BALANCED TR PO) Take 1 tablet by mouth 3 times daily (before meals) [address] [facility] [address]      Potassium 99 MG TABS Take 1 capsule by mouth every other day Historical Med      vitamin D (CHOLECALCIFEROL) 5000 units CAPS capsule Take 5,000 Units by mouth daily Skip SundayHistorical Med      Cinnamon 500 MG CAPS Take 500 mg by mouth 3 times daily Historical Med      Mechelle, Zingiber officinalis, (MECHELLE ROOT) 500 MG CAPS Take 1 capsule by mouth dailyHistorical Med      Turmeric Curcumin 500 MG CAPS Take 1 capsule by mouth dailyHistorical Med      melatonin 3 MG TABS tablet Take 5 mg by mouth nightly as neededHistorical Med      Garlic 767 MG CAPS Take 1 capsule by mouth 2 times dailyHistorical Med      Calcium Carbonate-Vit D-Min (CALCIUM 1200 PO) Take 1 tablet by mouth daily (before lunch) Historical Med      Omega-3 Fatty Acids (FISH OIL) 1200 MG CAPS Take 2 capsules by mouth 4 times daily (before meals and nightly) [facility] pharmaceutical grade when outHistorical Med      vitamin E 400 UNIT capsule Take 400 Units by mouth dailyHistorical Med      vitamin B-6 (PYRIDOXINE) 50 MG tablet Take 50 mg by mouth daily Historical Med      Cyanocobalamin (VITAMIN B-12) 3000 MCG SUBL Place 2 tablets under the tongue daily Historical Med      Biotin 2500 MCG CAPS Take 1 tablet by mouth every other day Historical Med      Olive Leaf Extract 250 MG CAPS Take 1 capsule by mouth 2 times dailyHistorical Med      Cetirizine HCl (ZYRTEC ALLERGY) 10 MG CAPS Take by mouth nightly Historical Med      Loratadine (CLARITIN) 10 MG CAPS Take 10 mg by mouth as needed Historical Med      !! NONFORMULARY Take 1 capsule by mouth 2 times daily Magtein by Source NaturalHistorical Med      diphenhydrAMINE-PE-APAP (ALLERGY RELIEF PLUS SINUS) 25-5-325 MG TABS Take by mouth as neededHistorical Med      !! NONFORMULARY Take 1 capsule by mouth daily as needed Orega Resp usually used in OctoberHistorical Med      !! NONFORMULARY Take 1 capsule by mouth 2 times daily as needed Respiration Blend SP-3 [facility]       !! - Potential duplicate medications found. Please discuss with provider. ALLERGIES     Patient is is allergic to gluten meal, orange oil, and pcn [penicillins]. Patients   Immunization History   Administered Date(s) Administered    COVID-19, Moderna, Primary or Immunocompromised, PF, 100mcg/0.5mL 02/19/2021, 03/19/2021    COVID-19, Pfizer Purple top, DILUTE for use, 12+ yrs, 30mcg/0.3mL dose 11/20/2021    Influenza Whole 12/05/2008    Influenza, High Dose (Fluzone 65 yrs and older) 12/07/2016, 10/24/2017, 10/11/2018, 08/17/2020    Influenza, High-dose, Yadiel Beckers, 65 yrs +, IM (Fluzone) 08/17/2020    Influenza, MDCK Quadv, with preserv IM (Flucelvax 2 yrs and older) 10/17/2019    Pneumococcal Conjugate 13-valent ([id number]) 11/05/2018    Pneumococcal Polysaccharide ([id number]) 11/03/2020    Tdap (Boostrix, Adacel) 10/12/2020       FAMILY HISTORY     Patient's family history includes Breast Cancer in her sister; Cancer in her mother; Diabetes in her mother; High Blood Pressure in her brother and mother; No Known Problems in her brother, brother, and father. SOCIAL HISTORY     Patient  reports that she has never smoked. She has never used smokeless tobacco. She reports that she does not drink alcohol and does not use drugs.     PHYSICAL EXAM     ED TRIAGE VITALS  BP: (!) 117/58, Temp: 98 °F (36.7 °C), Pulse: 77, Resp: 18, SpO2: 93 %,Estimated body mass index is 20.08 kg/m² as calculated from the following:    Height as of this encounter: 5' 4\" (1.626 m). Weight as of this encounter: 117 lb (53.1 kg). ,No LMP recorded. Patient is postmenopausal.    Physical Exam  Vitals and nursing note reviewed. Constitutional:       General: She is not in acute distress. Appearance: Normal appearance. She is not ill-appearing, toxic-appearing or diaphoretic. HENT:      Head: Normocephalic. Right Ear: Ear canal and external ear normal.      Left Ear: Ear canal and external ear normal.      Nose: Nose normal. No congestion or rhinorrhea. Mouth/Throat:      Mouth: Mucous membranes are moist.      Pharynx: Oropharynx is clear. No oropharyngeal exudate or posterior oropharyngeal erythema. Cardiovascular:      Rate and Rhythm: Normal rate. Pulses: Normal pulses. Pulmonary:      Effort: Pulmonary effort is normal. No respiratory distress. Breath sounds: No stridor. No wheezing or rhonchi. Abdominal:      General: Abdomen is flat. Bowel sounds are normal.      Palpations: Abdomen is soft. Tenderness: There is no right CVA tenderness or left CVA tenderness. Musculoskeletal:         General: No swelling or tenderness. Normal range of motion. Cervical back: Normal range of motion. Neurological:      General: No focal deficit present. Mental Status: She is alert and oriented to person, place, and time.    Psychiatric:         Mood and Affect: Mood normal.         Behavior: Behavior normal.         DIAGNOSTIC RESULTS     Labs:  Results for orders placed or performed during the hospital encounter of 02/24/22   Urinalysis   Result Value Ref Range    Glucose, Ur Negative NEGATIVE mg/dl    Bilirubin Urine Negative NEGATIVE    Ketones, Urine 15 NEGATIVE    Specific Gravity, UA 1.015 1.002 - 1.030    Blood, Urine Moderate (A) NEGATIVE    pH, UA 6.00 5.0 - 9.0    Protein, UA 30 (A) NEGATIVE mg/dl    Urobilinogen, Urine 0.20 0.2 - 1.0 eu/dl    Nitrite, Urine Positive (A) NEGATIVE    Leukocyte Esterase, Urine Small (A) NEGATIVE    Color, UA Yellow STRAW-YELLOW    Character, Urine Clear CLEAR-SL CLOUD       IMAGING:    No orders to display         EKG: None      URGENT CARE COURSE:     Vitals:    02/24/22 0933   BP: (!) 117/58   Pulse: 77   Resp: 18   Temp: 98 °F (36.7 °C)   SpO2: 93%   Weight: 117 lb (53.1 kg)   Height: 5' 4\" (1.626 m)       Medications - No data to display         PROCEDURES:  None    FINAL IMPRESSION      1. Acute cystitis with hematuria          DISPOSITION/ PLAN     Patient seen and evaluated for right flank pain and urinary urgency. A urinalysis was obtained revealing moderate blood, positive nitrites, and small leukocytes. She is provided a prescription for Macrobid. A urine culture has been ordered. She is instructed to continue to push oral fluids. She is instructed to follow-up with her PCP in 3 to 5 days if no worsening symptoms. We did discuss that she should present to the emergency department for worsening flank pain. She is instructed to use over-the-counter Tylenol and Motrin for pain or fever. She is agreeable to above plan and denies questions or concerns at this time.       PATIENT REFERRED TO:  MATTHEW Howard CNP  [address] [place], [place] [id number]      DISCHARGE MEDICATIONS:  Discharge Medication List as of 2/24/2022  9:55 AM      START taking these medications    Details   nitrofurantoin, macrocrystal-monohydrate, (MACROBID) 100 MG capsule Take 1 capsule by mouth 2 times daily for 7 days, Disp-14 capsule, R-0Normal             Discharge Medication List as of 2/24/2022  9:55 AM          Discharge Medication List as of 2/24/2022  9:55 AM          MATTHEW Gonsalves CNP    (Please note that portions of this note were completed with a voice recognition program. Efforts were made to edit the dictations but occasionally words are mis-transcribed.)           MATTHEW Gonsalves CNP  02/24/22 0425

## 2022-02-24 NOTE — Clinical Note
Idris Delatorre was seen and treated in our emergency department on 2/24/2022. She may return to work on 02/25/2022. May be off work one to two days if needed     If you have any questions or concerns, please don't hesitate to call.       Ashtyn Miranda, APRN - CNP

## 2022-02-25 ENCOUNTER — TELEPHONE (OUTPATIENT)
Dept: FAMILY MEDICINE CLINIC | Age: 77
End: 2022-02-25

## 2022-02-25 LAB
ORGANISM: ABNORMAL
URINE CULTURE, ROUTINE: ABNORMAL

## 2022-02-25 NOTE — LETTER
[address] [facility]  [address], [address]  Phone: [phone]  Fax: [phone]    February 25, 2022    [place] Manuel  [address] [facility]    Dear Damon De Santiago,    This letter is regarding your Emergency Department (ED) visit at [address] [address] on 2/24/22. Ayde Nava wanted to make sure that you understand your discharge instructions and that you were able to fill any prescriptions that may have been ordered for you. Please contact the office at the above phone number if the ED advised you to follow up with Ayde Nava, or if you have any further questions or needs. Also did you know -   *Visiting the ED for a non-emergency could result in higher co-pays than you would normally be subject to paying? *You can call your doctor even after hours so they can direct you to the most appropriate care. [employer] practices can often offer you an appointment on the same day that you call. *We have some [address] [place] [address] offices that offer Walk-in appointments; check our website for availability in your community, www. [employer].      *Evisits are now available for patients for $36 through [employer] for certain conditions:  * Sinus, cold and or cough       * Diarrhea            * Headache  * Heartburn                                * Poison Grisel          * Back pain     * Urinary problems                         If you do not have [employer] and are interested, please contact the office and a staff member may assist you or go to [email or web address].     Sincerely,   Halley Hunt, MATTHEW Montero CNP and your [facility]

## 2022-03-08 ENCOUNTER — OFFICE VISIT (OUTPATIENT)
Dept: FAMILY MEDICINE CLINIC | Age: 77
End: 2022-03-08
Payer: MEDICARE

## 2022-03-08 VITALS
BODY MASS INDEX: 19.97 KG/M2 | HEART RATE: 80 BPM | SYSTOLIC BLOOD PRESSURE: 136 MMHG | DIASTOLIC BLOOD PRESSURE: 70 MMHG | OXYGEN SATURATION: 96 % | WEIGHT: 117 LBS | TEMPERATURE: 97.2 F | HEIGHT: 64 IN | RESPIRATION RATE: 16 BRPM

## 2022-03-08 DIAGNOSIS — Z23 NEED FOR SHINGLES VACCINE: ICD-10-CM

## 2022-03-08 DIAGNOSIS — B96.20 E. COLI UTI (URINARY TRACT INFECTION): Primary | ICD-10-CM

## 2022-03-08 DIAGNOSIS — N39.0 E. COLI UTI (URINARY TRACT INFECTION): Primary | ICD-10-CM

## 2022-03-08 LAB
BILIRUBIN URINE: NEGATIVE
BLOOD URINE, POC: ABNORMAL
CHARACTER, URINE: ABNORMAL
COLOR, URINE: YELLOW
GLUCOSE URINE: NEGATIVE MG/DL
KETONES, URINE: NEGATIVE
LEUKOCYTE CLUMPS, URINE: ABNORMAL
NITRITE, URINE: NEGATIVE
PH, URINE: 6.5 (ref 5–9)
PROTEIN, URINE: NEGATIVE MG/DL
SPECIFIC GRAVITY, URINE: 1.02 (ref 1–1.03)
UROBILINOGEN, URINE: 0.2 EU/DL (ref 0–1)

## 2022-03-08 PROCEDURE — G8484 FLU IMMUNIZE NO ADMIN: HCPCS | Performed by: NURSE PRACTITIONER

## 2022-03-08 PROCEDURE — 1090F PRES/ABSN URINE INCON ASSESS: CPT | Performed by: NURSE PRACTITIONER

## 2022-03-08 PROCEDURE — G8427 DOCREV CUR MEDS BY ELIG CLIN: HCPCS | Performed by: NURSE PRACTITIONER

## 2022-03-08 PROCEDURE — 4040F PNEUMOC VAC/ADMIN/RCVD: CPT | Performed by: NURSE PRACTITIONER

## 2022-03-08 PROCEDURE — 81003 URINALYSIS AUTO W/O SCOPE: CPT | Performed by: NURSE PRACTITIONER

## 2022-03-08 PROCEDURE — G8400 PT W/DXA NO RESULTS DOC: HCPCS | Performed by: NURSE PRACTITIONER

## 2022-03-08 PROCEDURE — 99213 OFFICE O/P EST LOW 20 MIN: CPT | Performed by: NURSE PRACTITIONER

## 2022-03-08 PROCEDURE — G8420 CALC BMI NORM PARAMETERS: HCPCS | Performed by: NURSE PRACTITIONER

## 2022-03-08 PROCEDURE — 1036F TOBACCO NON-USER: CPT | Performed by: NURSE PRACTITIONER

## 2022-03-08 PROCEDURE — 1123F ACP DISCUSS/DSCN MKR DOCD: CPT | Performed by: NURSE PRACTITIONER

## 2022-03-08 RX ORDER — ZOSTER VACCINE RECOMBINANT, ADJUVANTED 50 MCG/0.5
0.5 KIT INTRAMUSCULAR SEE ADMIN INSTRUCTIONS
Qty: 0.5 ML | Refills: 0 | Status: SHIPPED | OUTPATIENT
Start: 2022-03-08 | End: 2022-09-04

## 2022-03-08 RX ORDER — CIPROFLOXACIN 500 MG/1
500 TABLET, FILM COATED ORAL 2 TIMES DAILY
Qty: 14 TABLET | Refills: 0 | Status: SHIPPED | OUTPATIENT
Start: 2022-03-08 | End: 2022-03-15

## 2022-03-08 ASSESSMENT — ENCOUNTER SYMPTOMS
ABDOMINAL PAIN: 0
EYE REDNESS: 0
SORE THROAT: 0
CONSTIPATION: 0
COUGH: 0
COLOR CHANGE: 0
NAUSEA: 0
ANAL BLEEDING: 0
ABDOMINAL DISTENTION: 0
EYE DISCHARGE: 0
SHORTNESS OF BREATH: 0
RHINORRHEA: 0
DIARRHEA: 0
BLOOD IN STOOL: 0

## 2022-03-08 NOTE — PATIENT INSTRUCTIONS
Patient Education        Learning About E. Coli Infections  What is an E. coli infection? E. coli (Escherichia coli) is the name of a germ, or bacterium, that lives in the digestive tracts of humans and animals. There are many types of E. coli, and most of them are harmless. But some can cause bloody diarrhea. Some strains of E. coli may also cause severe anemia or kidney failure, which can lead to death. Other strains can cause other infections. How can you get an infection from E. coli? A common type of E. coli infection causes bloody diarrhea and cramps. People in the [place] most often get this infection by eating meat or other foods that have been infected with the bacteria. What are the symptoms? If you get an E. coli infection from food, you may have symptoms like bloody diarrhea, stomach cramps, nausea, and vomiting. Symptoms usually start 1 to 4 days after coming in contact with E. coli. But some people don't notice any symptoms. How is it treated? An E. coli infection usually goes away on its own. Your main treatment is to make yourself comfortable and drink sips of water. Diarrhea causes the body to lose more water than usual. This can lead to dehydration, which is especially dangerous for babies and older adults. Taking frequent, small sips of water will help prevent dehydration. If you are not getting better, ask your doctor if you need treatment for E. coli. Some types of E. coli can be treated with antibiotics. If you have bloody diarrhea that may be from an E. coli infection, don't take diarrhea medicine. These medicines can slow down the digestion process. This can allow more time for your body to absorb the poisons made by the E. coli. In some people, the infection causes serious problems with the blood and kidneys. These people may need blood transfusions or dialysis. How can you prevent an E. coli infection?   To prevent intestinal tract infections from E. coli:  · Never eat raw or undercooked ground beef or pork. Cook ground meat to a temperature of at least 160°F. Always use a meat thermometer. Ground beef should be cooked until all pink color is gone. · Cut open restaurant and home-cooked hamburgers to make sure that they have been completely cooked. The juices should be clear or yellowish, with no trace of pink. · Always wash cooking tools, cutting boards, dishes, countertops, and utensils with hot, soapy water right after they have come into contact with raw meat. Do not put cooked meat back onto a plate that has held raw meat unless you have thoroughly washed the plate. · Use separate cutting boards for raw meat and other food items. · Wash your hands often with hot, soapy water, especially after handling raw meat. Always wash your hands after bowel movements or changing diapers. · Dispose of soiled diapers and stools carefully. Where can you learn more? Go to https://[employer].Xplornet. org and sign in to your [employer] account. Enter Y224 in the [facility] box to learn more about \"Learning About E. Coli Infections. \"     If you do not have an account, please click on the \"Sign Up Now\" link. Current as of: July 1, 2021               Content Version: 13.1  © 2006-2021 [employer]. Care instructions adapted under license by [facility] ([facility]). If you have questions about a medical condition or this instruction, always ask your healthcare professional. [place] [address] any warranty or liability for your use of this information. Patient Education        Live Zoster (Shingles) Vaccine: What You Need to Know  Why get vaccinated? Live zoster (shingles) vaccine can prevent shingles. Shingles (also called herpes zoster, or just zoster) is a painful skin rash, usually with blisters. In addition to the rash, shingles can cause fever, headache, chills, or upset stomach.  More rarely, shingles can lead to pneumonia, hearing problems, episode of shingles. In some cases, your health care provider may decide to postpone shingles vaccination to a future visit. People with minor illnesses, such as a cold, may be vaccinated. People who are moderately or severely ill should usually wait until they recover before getting live shingles vaccine. Your health care provider can give you more information. Risks of a vaccine reaction  · Redness, soreness, swelling, or itching at the site of the injection and headache can happen after live shingles vaccine. Rarely, live shingles vaccine can cause rash or shingles. People sometimes faint after medical procedures, including vaccination. Tell your provider if you feel dizzy or have vision changes or ringing in the ears. As with any medicine, there is a very remote chance of a vaccine causing a severe allergic reaction, other serious injury, or death. What if there is a serious problem? An allergic reaction could occur after the vaccinated person leaves the clinic. If you see signs of a severe allergic reaction (hives, swelling of the face and throat, difficulty breathing, a fast heartbeat, dizziness, or weakness), call 9-1-1 and get the person to the nearest hospital.  For other signs that concern you, call your health care provider. Adverse reactions should be reported to the Vaccine Adverse Event Reporting System (VAERS). Your health care provider will usually file this report, or you can do it yourself. Visit the VAERS website at www.vaers. hhs.gov or call 3-688.260.1578. VAERS is only for reporting reactions, and VAERS staff do not give medical advice. How can I learn more? · Ask your health care provider. · Call your local or state health department. · Contact the Centers for Disease Control and Prevention (CDC):  ? Call 3-771.144.8862 (1-800-CDC-INFO) or  ?  Visit CDC's website at www.cdc.gov/vaccines  Vaccine Information Statement  Live Zoster Vaccine  10/30/2019  Department of Health and Human Services  Centers for Disease Control and Prevention  Many Vaccine Information Statements are available in Welsh and other languages. See www.immunize.org/vis   Hojas de Información Sobre Vacunas están disponibles en Español y en muchos otros idiomas. Visite Maria T.si   Care instructions adapted under license by [facility] ([facility]). If you have questions about a medical condition or this instruction, always ask your healthcare professional. [place] [address] any warranty or liability for your use of this information. Patient Education        Urinary Tract Infection (UTI) in Women: Care Instructions  Overview     A urinary tract infection, or UTI, is a general term for an infection anywhere between the kidneys and the urethra (where urine comes out). Most UTIs are bladder infections. They often cause pain or burning when you urinate. UTIs are caused by bacteria and can be cured with antibiotics. Be sure to complete your treatment so that the infection does not get worse. Follow-up care is a key part of your treatment and safety. Be sure to make and go to all appointments, and call your doctor if you are having problems. It's also a good idea to know your test results and keep a list of the medicines you take. How can you care for yourself at home? · Take your antibiotics as directed. Do not stop taking them just because you feel better. You need to take the full course of antibiotics. · Drink extra water and other fluids for the next day or two. This will help make the urine less concentrated and help wash out the bacteria that are causing the infection. (If you have kidney, heart, or liver disease and have to limit fluids, talk with your doctor before you increase the amount of fluids you drink.)  · Avoid drinks that are carbonated or have caffeine. They can irritate the bladder. · Urinate often. Try to empty your bladder each time.   · To relieve pain, take a hot bath or lay a heating pad set on low over your lower belly or genital area. Never go to sleep with a heating pad in place. To prevent UTIs  · Drink plenty of water each day. This helps you urinate often, which clears bacteria from your system. (If you have kidney, heart, or liver disease and have to limit fluids, talk with your doctor before you increase the amount of fluids you drink.)  · Urinate when you need to. · If you are sexually active, urinate right after you have sex. · Change sanitary pads often. · Avoid douches, bubble baths, feminine hygiene sprays, and other feminine hygiene products that have deodorants. · After going to the bathroom, wipe from front to back. When should you call for help? Call your doctor now or seek immediate medical care if:    · Symptoms such as fever, chills, nausea, or vomiting get worse or appear for the first time.     · You have new pain in your back just below your rib cage. This is called flank pain.     · There is new blood or pus in your urine.     · You have any problems with your antibiotic medicine. Watch closely for changes in your health, and be sure to contact your doctor if:    · You are not getting better after taking an antibiotic for 2 days.     · Your symptoms go away but then come back. Where can you learn more? Go to https://OfferLoungepeshelleyeb.[email or web address] org and sign in to your [employer] account. Enter L797 in the [facility] box to learn more about \"Urinary Tract Infection (UTI) in Women: Care Instructions. \"     If you do not have an account, please click on the \"Sign Up Now\" link. Current as of: February 10, 2021               Content Version: 13.1  © 7228-2955 Healthwise, Incorporated. Care instructions adapted under license by [facility] ([facility]).  If you have questions about a medical condition or this instruction, always ask your healthcare professional. [address] any warranty or liability for your use of this information.

## 2022-03-08 NOTE — PROGRESS NOTES
Health Maintenance Due   Topic Date Due    Shingles Vaccine (1 of 2) Never done   [employer] Visit (AWV)  11/04/2021

## 2022-03-08 NOTE — PROGRESS NOTES
[address] [facility]  [phone] (phone)  [phone] (fax)    Visit Date: 3/8/2022    Jose A Vázquez is a 69 y.o. female who presents today for:  Chief Complaint   Patient presents with    Follow-up     HPI:     Urgent care follow-up - E. Coli UTI - seen there 2/24 - given Macrobid     Doing well - feeling better    HPI  Health Maintenance   Topic Date Due    Shingles Vaccine (1 of 2) Never done   [employer] Visit (AWV)  11/04/2021    Depression Screen  06/08/2022    DTaP/Tdap/Td vaccine (2 - Td or Tdap) 10/12/2030    DEXA (modify frequency per FRAX score)  Completed    Flu vaccine  Completed    Pneumococcal 65+ years Vaccine  Completed    COVID-19 Vaccine  Completed    Hepatitis C screen  Completed    Hepatitis A vaccine  Aged Out    Hepatitis B vaccine  Aged Out    Hib vaccine  Aged Out    Meningococcal (ACWY) vaccine  Aged Out     Past Medical History:   Diagnosis Date    Cancer ([facility] [address]       Past Surgical History:   Procedure Laterality Date    BREAST SURGERY      FINGER TRIGGER RELEASE Right 2017     Family History   Problem Relation Age of Onset    Diabetes Mother     Cancer Mother         colon    High Blood Pressure Mother     No Known Problems Father     Breast Cancer Sister     High Blood Pressure Brother     No Known Problems Brother     No Known Problems Brother      Social History     Tobacco Use    Smoking status: Never Smoker    Smokeless tobacco: Never Used   Substance Use Topics    Alcohol use: No      Current Outpatient Medications   Medication Sig Dispense Refill    NONFORMULARY Take by mouth multidophilus      zoster recombinant adjuvanted vaccine (SHINGRIX) 50 MCG/0.5ML SUSR injection Inject 0.5 mLs into the muscle See Admin Instructions 1 dose now and repeat in 2-6 months 0.5 mL 0    NONFORMULARY Take 1 capsule by mouth every morning (before breakfast) [employer] Pending [facility] to [facility] phosphatidyl serine + bacopa monnieri      Coenzyme Q10-Red Yeast Rice  MG CAPS Take 1 capsule by mouth 2 times daily (before meals) Supper and bedtime      letrozole (FEMARA) 2.5 MG tablet Take 1 tablet by mouth daily  3    NONFORMULARY Take 1 capsule by mouth 2 times daily [employer]      B Complex-Folic Acid (I-594 BALANCED TR PO) Take 1 tablet by mouth 3 times daily (before meals) [id number] [facility] [employer] Potassium 99 MG TABS Take 1 capsule by mouth every other day       diphenhydrAMINE-PE-APAP (ALLERGY RELIEF PLUS SINUS) 25-5-325 MG TABS Take by mouth as needed      vitamin D (CHOLECALCIFEROL) 5000 units CAPS capsule Take 5,000 Units by mouth daily Skip Sunday      Cinnamon 500 MG CAPS Take 500 mg by mouth 3 times daily       Ginger, Zingiber officinalis, (GINGER ROOT) 500 MG CAPS Take 1 capsule by mouth daily      Turmeric Curcumin 500 MG CAPS Take 1 capsule by mouth daily      melatonin 3 MG TABS tablet Take 5 mg by mouth nightly as needed      Garlic 482 MG CAPS Take 1 capsule by mouth 2 times daily      Calcium Carbonate-Vit D-Min (CALCIUM 1200 PO) Take 1 tablet by mouth daily (before lunch)       Omega-3 Fatty Acids (FISH OIL) 1200 MG CAPS Take 2 capsules by mouth 4 times daily (before meals and nightly) [facility] pharmaceutical grade when out      vitamin E 400 UNIT capsule Take 400 Units by mouth daily      vitamin B-6 (PYRIDOXINE) 50 MG tablet Take 50 mg by mouth daily       Cyanocobalamin (VITAMIN B-12) 3000 MCG SUBL Place 2 tablets under the tongue daily       Biotin 2500 MCG CAPS Take 1 tablet by mouth every other day       NONFORMULARY Take 1 capsule by mouth daily as needed [place] Resp usually used in October      NONFORMULARY Take 1 capsule by mouth 2 times daily as needed Respiration Blend SP-3 usually used in October      Olive Leaf Extract 250 MG CAPS Take 1 capsule by mouth 2 times daily      Cetirizine HCl (ZYRTEC ALLERGY) 10 MG CAPS Take by mouth nightly       Loratadine (CLARITIN) 10 MG CAPS Take 10 mg by mouth as needed        No current facility-administered medications for this visit. Allergies   Allergen Reactions    Gluten Meal Other (See Comments)     Gluten,fruit, and sugar causes blisters of mouth    Orange Oil Other (See Comments)     Any acidic fruit    Pcn [Penicillins] Other (See Comments)     Unknown  Childhood reaction. Subjective:    Review of Systems   Constitutional: Negative for chills, fatigue and fever. HENT: Negative for congestion, ear pain, postnasal drip, rhinorrhea and sore throat. Eyes: Negative for discharge and redness. Respiratory: Negative for cough and shortness of breath. Cardiovascular: Negative for chest pain and leg swelling. Gastrointestinal: Negative for abdominal distention, abdominal pain, anal bleeding, blood in stool, constipation, diarrhea and nausea. Skin: Negative for color change and rash. Neurological: Negative for facial asymmetry, speech difficulty and weakness. Hematological: Does not bruise/bleed easily. Psychiatric/Behavioral: Negative for agitation and confusion. Objective:     Vitals:    03/08/22 1554   BP: 136/70   Site: Right Upper Arm   Position: Sitting   Cuff Size: Medium Adult   Pulse: 80   Resp: 16   Temp: 97.2 °F (36.2 °C)   TempSrc: Skin   SpO2: 96%   Weight: 117 lb (53.1 kg)   Height: 5' 4\" (1.626 m)       Body mass index is 20.08 kg/m². Wt Readings from Last 3 Encounters:   03/08/22 117 lb (53.1 kg)   02/24/22 117 lb (53.1 kg)   12/16/21 119 lb 6.4 oz (54.2 kg)     BP Readings from Last 3 Encounters:   03/08/22 136/70   02/24/22 (!) 117/58   12/16/21 136/72     Physical Exam  Constitutional:       General: She is not in acute distress. Appearance: She is well-developed. She is not diaphoretic. HENT:      Head: Normocephalic and atraumatic. Right Ear: Hearing and external ear normal. No swelling. Left Ear: Hearing and external ear normal. No swelling. Nose: No mucosal edema or rhinorrhea.       Right Sinus: No maxillary sinus tenderness or frontal sinus tenderness. Left Sinus: No maxillary sinus tenderness or frontal sinus tenderness. Mouth/Throat:      Pharynx: No oropharyngeal exudate or posterior oropharyngeal erythema. Eyes:      General:         Right eye: No discharge. Left eye: No discharge. Conjunctiva/sclera: Conjunctivae normal.      Pupils: Pupils are equal, round, and reactive to light. Cardiovascular:      Comments: No lower extremity edema  Pulmonary:      Effort: Pulmonary effort is normal. No respiratory distress. Breath sounds: Normal breath sounds. Musculoskeletal:         General: No tenderness or deformity. Cervical back: Normal range of motion. Comments: Full ROM of upper and lower extremities    Lymphadenopathy:      Cervical: No cervical adenopathy. Skin:     General: Skin is warm and dry. Findings: No rash. Nails: There is no clubbing. Neurological:      Mental Status: She is alert and oriented to person, place, and time. Coordination: Coordination normal.      Gait: Gait normal.   Psychiatric:         Speech: Speech normal.         Behavior: Behavior normal.         Thought Content: Thought content normal.         Judgment: Judgment normal.         Lab Results   Component Value Date    WBC 4.5 (L) 01/11/2022    HGB 13.0 01/11/2022    HCT 40.7 01/11/2022     01/11/2022    CHOL 301 (H) 12/08/2021    TRIG 79 12/08/2021     12/08/2021    LDLCALC 179 12/08/2021    AST 19 01/11/2022     01/11/2022    K 4.4 01/11/2022     01/11/2022    CREATININE 0.9 01/11/2022    BUN 29 (H) 01/11/2022    CO2 24 01/11/2022    TSH 1.680 12/08/2021    LABA1C 5.5 06/08/2021    LABGLOM 61 (A) 01/11/2022    MG 2.2 12/08/2021    CALCIUM 9.2 01/11/2022    VITD25 51 12/08/2021     Assessment:       Diagnosis Orders   1. E. coli UTI (urinary tract infection)  POCT Urinalysis No Micro (Auto)   2.  Need for shingles vaccine  zoster

## 2022-03-10 LAB
ORGANISM: ABNORMAL
URINE CULTURE, ROUTINE: ABNORMAL

## 2022-03-11 ENCOUNTER — TELEPHONE (OUTPATIENT)
Dept: FAMILY MEDICINE CLINIC | Age: 77
End: 2022-03-11

## 2022-03-11 NOTE — TELEPHONE ENCOUNTER
----- Message from MATTHEW Archer CNP sent at 3/11/2022 10:08 AM EST -----  Urine positive for E. coli. Continue taking Cipro as directed until gone.   Please get the urine rechecked at the end of antibiotic -order was already placed at last visit

## 2022-03-15 ENCOUNTER — NURSE ONLY (OUTPATIENT)
Dept: LAB | Age: 77
End: 2022-03-15

## 2022-03-15 DIAGNOSIS — R35.0 URINARY FREQUENCY: ICD-10-CM

## 2022-03-15 DIAGNOSIS — K90.89 OTHER INTESTINAL MALABSORPTION: ICD-10-CM

## 2022-03-15 DIAGNOSIS — R73.09 LOW GLUCOSE LEVEL: ICD-10-CM

## 2022-03-15 DIAGNOSIS — Z71.89 ENCOUNTER FOR HERB AND VITAMIN SUPPLEMENT MANAGEMENT: ICD-10-CM

## 2022-03-15 DIAGNOSIS — E78.41 ELEVATED LIPOPROTEIN(A): ICD-10-CM

## 2022-03-15 DIAGNOSIS — E55.9 VITAMIN D DEFICIENCY: ICD-10-CM

## 2022-03-15 DIAGNOSIS — Z13.29 THYROID DISORDER SCREEN: ICD-10-CM

## 2022-03-15 DIAGNOSIS — C50.011 MALIGNANT NEOPLASM OF NIPPLE OF RIGHT BREAST IN FEMALE, UNSPECIFIED ESTROGEN RECEPTOR STATUS (HCC): ICD-10-CM

## 2022-03-15 LAB
BASOPHILS # BLD: 1.5 %
BASOPHILS ABSOLUTE: 0.1 THOU/MM3 (ref 0–0.1)
CALCIUM SERPL-MCNC: 9.5 MG/DL (ref 8.5–10.5)
CHOLESTEROL, TOTAL: 241 MG/DL (ref 100–199)
EOSINOPHIL # BLD: 3.7 %
EOSINOPHILS ABSOLUTE: 0.2 THOU/MM3 (ref 0–0.4)
ERYTHROCYTE [DISTWIDTH] IN BLOOD BY AUTOMATED COUNT: 13.8 % (ref 11.5–14.5)
ERYTHROCYTE [DISTWIDTH] IN BLOOD BY AUTOMATED COUNT: 47.1 FL (ref 35–45)
HCT VFR BLD CALC: 36.9 % (ref 37–47)
HDLC SERPL-MCNC: 90 MG/DL
HEMOGLOBIN: 12 GM/DL (ref 12–16)
IMMATURE GRANS (ABS): 0.01 THOU/MM3 (ref 0–0.07)
IMMATURE GRANULOCYTES: 0.2 %
LDL CHOLESTEROL CALCULATED: 138 MG/DL
LYMPHOCYTES # BLD: 34.9 %
LYMPHOCYTES ABSOLUTE: 1.7 THOU/MM3 (ref 1–4.8)
MAGNESIUM: 2.1 MG/DL (ref 1.6–2.4)
MCH RBC QN AUTO: 30.1 PG (ref 26–33)
MCHC RBC AUTO-ENTMCNC: 32.5 GM/DL (ref 32.2–35.5)
MCV RBC AUTO: 92.5 FL (ref 81–99)
MONOCYTES # BLD: 8.1 %
MONOCYTES ABSOLUTE: 0.4 THOU/MM3 (ref 0.4–1.3)
NUCLEATED RED BLOOD CELLS: 0 /100 WBC
PLATELET # BLD: 308 THOU/MM3 (ref 130–400)
PMV BLD AUTO: 10.2 FL (ref 9.4–12.4)
PTH INTACT: 26.6 PG/ML (ref 15–65)
RBC # BLD: 3.99 MILL/MM3 (ref 4.2–5.4)
SEDIMENTATION RATE, ERYTHROCYTE: 20 MM/HR (ref 0–20)
SEG NEUTROPHILS: 51.6 %
SEGMENTED NEUTROPHILS ABSOLUTE COUNT: 2.5 THOU/MM3 (ref 1.8–7.7)
TRIGL SERPL-MCNC: 66 MG/DL (ref 0–199)
VITAMIN D 25-HYDROXY: 55 NG/ML (ref 30–100)
WBC # BLD: 4.8 THOU/MM3 (ref 4.8–10.8)

## 2022-03-16 ENCOUNTER — NURSE ONLY (OUTPATIENT)
Dept: LAB | Age: 77
End: 2022-03-16

## 2022-03-16 DIAGNOSIS — N39.0 E. COLI UTI (URINARY TRACT INFECTION): ICD-10-CM

## 2022-03-16 DIAGNOSIS — B96.20 E. COLI UTI (URINARY TRACT INFECTION): ICD-10-CM

## 2022-03-16 LAB
BACTERIA: ABNORMAL /HPF
BILIRUBIN URINE: NEGATIVE
BLOOD, URINE: ABNORMAL
C-REACTIVE PROTEIN, HIGH SENSITIVITY: 1.7 MG/L
CASTS 2: ABNORMAL /LPF
CASTS UA: ABNORMAL /LPF
CHARACTER, URINE: CLEAR
COLOR: YELLOW
CRYSTALS, UA: ABNORMAL
EPITHELIAL CELLS, UA: ABNORMAL /HPF
ESTRADIOL LEVEL: 16 PG/ML (ref 5–50)
GLUCOSE URINE: NEGATIVE MG/DL
KETONES, URINE: NEGATIVE
LEUKOCYTE ESTERASE, URINE: NEGATIVE
MISCELLANEOUS 2: ABNORMAL
NITRITE, URINE: NEGATIVE
PH UA: 5 (ref 5–9)
PROTEIN UA: NEGATIVE
RBC URINE: ABNORMAL /HPF
RENAL EPITHELIAL, UA: ABNORMAL
SPECIFIC GRAVITY, URINE: 1.02 (ref 1–1.03)
UROBILINOGEN, URINE: 0.2 EU/DL (ref 0–1)
WBC UA: ABNORMAL /HPF
YEAST: ABNORMAL

## 2022-03-17 ENCOUNTER — OFFICE VISIT (OUTPATIENT)
Dept: FAMILY MEDICINE CLINIC | Age: 77
End: 2022-03-17
Payer: MEDICARE

## 2022-03-17 VITALS
SYSTOLIC BLOOD PRESSURE: 108 MMHG | OXYGEN SATURATION: 98 % | DIASTOLIC BLOOD PRESSURE: 62 MMHG | HEIGHT: 64 IN | BODY MASS INDEX: 20.35 KG/M2 | WEIGHT: 119.2 LBS | HEART RATE: 90 BPM | RESPIRATION RATE: 10 BRPM | TEMPERATURE: 96.6 F

## 2022-03-17 DIAGNOSIS — E78.2 MIXED HYPERLIPIDEMIA: ICD-10-CM

## 2022-03-17 DIAGNOSIS — R73.09 LOW GLUCOSE LEVEL: ICD-10-CM

## 2022-03-17 DIAGNOSIS — K90.89 OTHER INTESTINAL MALABSORPTION: ICD-10-CM

## 2022-03-17 DIAGNOSIS — N39.0 E. COLI UTI (URINARY TRACT INFECTION): Primary | ICD-10-CM

## 2022-03-17 DIAGNOSIS — C50.011 MALIGNANT NEOPLASM OF NIPPLE OF RIGHT BREAST IN FEMALE, UNSPECIFIED ESTROGEN RECEPTOR STATUS (HCC): ICD-10-CM

## 2022-03-17 DIAGNOSIS — R53.83 OTHER FATIGUE: ICD-10-CM

## 2022-03-17 DIAGNOSIS — E55.9 VITAMIN D DEFICIENCY: ICD-10-CM

## 2022-03-17 DIAGNOSIS — H04.123 DRY EYES: ICD-10-CM

## 2022-03-17 DIAGNOSIS — E78.41 ELEVATED LIPOPROTEIN(A): ICD-10-CM

## 2022-03-17 DIAGNOSIS — M19.019 ARTHRITIS PAIN OF SHOULDER: ICD-10-CM

## 2022-03-17 DIAGNOSIS — B96.20 E. COLI UTI (URINARY TRACT INFECTION): Primary | ICD-10-CM

## 2022-03-17 DIAGNOSIS — R35.0 URINARY FREQUENCY: ICD-10-CM

## 2022-03-17 DIAGNOSIS — Z71.89 ENCOUNTER FOR HERB AND VITAMIN SUPPLEMENT MANAGEMENT: ICD-10-CM

## 2022-03-17 LAB — DHEAS (DHEA SULFATE): 116 UG/DL (ref 10–90)

## 2022-03-17 PROCEDURE — 4040F PNEUMOC VAC/ADMIN/RCVD: CPT | Performed by: FAMILY MEDICINE

## 2022-03-17 PROCEDURE — G8420 CALC BMI NORM PARAMETERS: HCPCS | Performed by: FAMILY MEDICINE

## 2022-03-17 PROCEDURE — 1123F ACP DISCUSS/DSCN MKR DOCD: CPT | Performed by: FAMILY MEDICINE

## 2022-03-17 PROCEDURE — 1090F PRES/ABSN URINE INCON ASSESS: CPT | Performed by: FAMILY MEDICINE

## 2022-03-17 PROCEDURE — G8484 FLU IMMUNIZE NO ADMIN: HCPCS | Performed by: FAMILY MEDICINE

## 2022-03-17 PROCEDURE — 1036F TOBACCO NON-USER: CPT | Performed by: FAMILY MEDICINE

## 2022-03-17 PROCEDURE — 99213 OFFICE O/P EST LOW 20 MIN: CPT | Performed by: FAMILY MEDICINE

## 2022-03-17 PROCEDURE — G8400 PT W/DXA NO RESULTS DOC: HCPCS | Performed by: FAMILY MEDICINE

## 2022-03-17 PROCEDURE — G8427 DOCREV CUR MEDS BY ELIG CLIN: HCPCS | Performed by: FAMILY MEDICINE

## 2022-03-17 NOTE — PROGRESS NOTES
[address] [facility] [place] Bakari VELÁSQUEZ [address] [address]  Dept: [phone]  Dept Fax: [phone]  Loc: [address] is a 69 y.o. White female. Analisa Morataya  presents to the [place] [address] clinic today for   Chief Complaint   Patient presents with    3 Month Follow-Up   , and;   1. E. coli UTI (urinary tract infection)    2. Vitamin D deficiency    3. Mixed hyperlipidemia    4. Other fatigue    5. Malignant neoplasm of nipple of right breast in female, unspecified estrogen receptor status ([facility] [address]    6. Encounter for herb and vitamin supplement management    7. Other intestinal malabsorption    8. Urinary frequency    9. Low glucose level    10. Elevated lipoprotein(a)    11. Dry eyes    12. Arthritis pain of shoulder          I have reviewed [facility] medical, surgical and other pertinent history in detail, and have updated medication and allergy information in the computerized patient record. Clinical Care Team:     -Referring Provider for today's consult: self  -Primary Care Provider: MATTHEW Alva - [facility]    Medical/Surgical History:   She  has a past medical history of Cancer [facility]). Her  has a past surgical history that includes Breast surgery and Finger trigger release (Right, 2017). Family/Social History:     Her family history includes Breast Cancer in her sister; Cancer in her mother; Diabetes in her mother; High Blood Pressure in her brother and mother; No Known Problems in her brother, brother, and father. She  reports that she has never smoked. She has never used smokeless tobacco. She reports that she does not drink alcohol and does not use drugs.     Medications/Allergies/Immunizations:     Her current medication(s) include   Current Outpatient Medications:     NONFORMULARY, Take by mouth multidophilus, Disp: , Rfl:     zoster recombinant adjuvanted vaccine (SHINGRIX) 50 MCG/0.5ML SUSR injection, Inject 0.5 mLs into the muscle See Admin Instructions 1 dose now and repeat in 2-6 months, Disp: 0.5 mL, Rfl: 0    NONFORMULARY, Take 1 capsule by mouth 2 times daily (with meals) [facility] FAHEEMFIDE MOISEON phosphatidyl serine + bacopa monnieri , Disp: , Rfl:     Coenzyme Q10-Red Yeast Rice  MG CAPS, Take 1 capsule by mouth 2 times daily (before meals) Supper and bedtime, Disp: , Rfl:     letrozole (FEMARA) 2.5 MG tablet, Take 1 tablet by mouth daily, Disp: , Rfl: 3    NONFORMULARY, Take 1 capsule by mouth 3 times daily Magtein by Source Natural, Disp: , Rfl:     B Complex-Folic Acid (T-980 BALANCED TR PO), Take 1 tablet by mouth 3 times daily (before meals) [address] [facility] , Disp: , Rfl:     Potassium 99 MG TABS, Take 1 capsule by mouth every other day , Disp: , Rfl:     diphenhydrAMINE-PE-APAP (ALLERGY RELIEF PLUS SINUS) 25-5-325 MG TABS, Take by mouth as needed, Disp: , Rfl:     vitamin D (CHOLECALCIFEROL) 5000 units CAPS capsule, Take 5,000 Units by mouth daily Skip Sunday, Disp: , Rfl:     Cinnamon 500 MG CAPS, Take 500 mg by mouth 3 times daily , Disp: , Rfl:     Ginger, Zingiber officinalis, (GINGER ROOT) 500 MG CAPS, Take 1 capsule by mouth daily, Disp: , Rfl:     Turmeric Curcumin 500 MG CAPS, Take 1 capsule by mouth 2 times daily (with meals) , Disp: , Rfl:     melatonin 3 MG TABS tablet, Take 5 mg by mouth nightly as needed, Disp: , Rfl:     Garlic 493 MG CAPS, Take 1 capsule by mouth 2 times daily, Disp: , Rfl:     Calcium Carbonate-Vit D-Min (CALCIUM 1200 PO), Take 1 tablet by mouth daily (before lunch) , Disp: , Rfl:     Omega-3 Fatty Acids (FISH OIL) 1200 MG CAPS, Take 3 capsules by mouth 4 times daily (before meals and nightly) [employer]S pharmaceutical grade, Disp: , Rfl:     vitamin E 400 UNIT capsule, Take 400 Units by mouth daily, Disp: , Rfl:     vitamin B-6 (PYRIDOXINE) 50 MG tablet, Take 50 mg by mouth daily , Disp: , Rfl:     Cyanocobalamin (VITAMIN B-12) 3000 MCG SUBL, Place 2 tablets under the tongue daily , Disp: , Rfl:     Biotin 2500 MCG CAPS, Take 1 tablet by mouth every other day , Disp: , Rfl:     NONFORMULARY, Take 1 capsule by mouth daily as needed Orega Resp usually used in October, Disp: , Rfl:     NONFORMULARY, Take 1 capsule by mouth 2 times daily as needed Respiration Blend SP-3 usually used in October, Disp: , Rfl:     Olive Leaf Extract 250 MG CAPS, Take 1 capsule by mouth 2 times daily, Disp: , Rfl:     Cetirizine HCl (ZYRTEC ALLERGY) 10 MG CAPS, Take by mouth nightly , Disp: , Rfl:     Loratadine (CLARITIN) 10 MG CAPS, Take 10 mg by mouth as needed , Disp: , Rfl:   Allergies: Gluten meal, Orange oil, and Pcn [penicillins]  Immunizations:   Immunization History   Administered Date(s) Administered    COVID-19, Moderna, Primary or Immunocompromised, PF, 100mcg/0.5mL 02/19/2021, 02/19/2021, 03/19/2021, 03/19/2021    COVID-19, Pfizer Purple top, DILUTE for use, 12+ yrs, 30mcg/0.3mL dose 11/20/2021    Influenza Whole 12/05/2008    Influenza, High Dose (Fluzone 65 yrs and older) 12/07/2016, 10/24/2017, 10/11/2018, 08/17/2020    Influenza, High-dose, Quadv, 65 yrs +, IM (Fluzone) 08/17/2020    Influenza, MDCK Quadv, with preserv IM (Flucelvax 2 yrs and older) 10/17/2019, 10/17/2019    Pneumococcal Conjugate 13-valent (Inekaqi21) 11/05/2018    Pneumococcal Polysaccharide (Chlydnypg76) 11/03/2020    Tdap (Boostrix, Adacel) 10/12/2020        History of Present Illness:     Jenifer's had concerns including 3 Month Follow-Up. David Dumas  presents to the [address] [place] [address] today for;   Chief Complaint   Patient presents with    3 Month Follow-Up   , abnormal labs follow up and these conditions as she  Is looking today for:     1. E. coli UTI (urinary tract infection)    2. Vitamin D deficiency    3. Mixed hyperlipidemia    4. Other fatigue    5.  Malignant neoplasm of nipple of right breast in female, unspecified estrogen receptor status ([facility]    6. Encounter for herb and vitamin supplement management    7. Other intestinal malabsorption    8. Urinary frequency    9. Low glucose level    10. Elevated lipoprotein(a)    11. Dry eyes    12. Arthritis pain of shoulder      HPI    Subjective:     Review of Systems   Constitutional: Positive for unexpected weight change. Musculoskeletal: Positive for arthralgias and joint swelling. All other systems reviewed and are negative. Objective:     /62 (Site: Right Upper Arm, Position: Sitting, Cuff Size: Medium Adult)   Pulse 90   Temp 96.6 °F (35.9 °C) (Temporal)   Resp 10   Ht 5' 4\" (1.626 m)   Wt 119 lb 3.2 oz (54.1 kg)   SpO2 98%   BMI 20.46 kg/m²   Physical Exam  Vitals and nursing note reviewed. Laboratory Data:   Lab results were searched in Care Everywhere and/or those brought by the pateint were reviewed today with Quinten Abdi and she has a copy of their most recent labs to take home with them as noted below;       Imaging Data:   Imaging Data:       Assessment & Plan:       Impression:  1. E. coli UTI (urinary tract infection)    2. Vitamin D deficiency    3. Mixed hyperlipidemia    4. Other fatigue    5. Malignant neoplasm of nipple of right breast in female, unspecified estrogen receptor status ([facility]    6. Encounter for herb and vitamin supplement management    7. Other intestinal malabsorption    8. Urinary frequency    9. Low glucose level    10. Elevated lipoprotein(a)    11. Dry eyes    12. Arthritis pain of shoulder      Assessment and Plan:  After reviewing the patients chief complaints, reviewing their labfindings in great detail (with the patient and those accompanying them) which correlate to their chief complaints, symptoms, and or medical conditions; suggestions were made relating to changes in diet and or supplements which may improve the complaints and which will be reflected in their future lab findings;   Chief Complaint   Patient presents with  3 Month Follow-Up   ;    Plans for the next visits:  - Abnormal and non-optimal Labs were ordered today to be repeated in the next 120-365 days to assess changes from adjustments in nutrition and or nutrients. - Patient instructed when having a blood draw to ask the  to divide their lab draws into multiple draws over several days if not feeling good at the time of the lab draw or if either prefers to do several smaller blood draws over several days  -Patient instructed to check with insurer before each lab draw and to go to the lab which the insurer directs them for the most cost effective lab draw with the least patient's cost  - Raymonde Curling  will be scheduled subsequent to those results. Yury Dupree will bring in her drink, food, supplement log to her next visit    Chronic Problems Addressed on this Visit:                                   1.  Intensity of Service; Uncontrolled items at this visit; Chief Complaint   Patient presents with    3 Month Follow-Up   ; Improved items at this visit and Stable items were discussed at this visit;  2. Patients food, drinks, supplements and symptoms were reviewed with the patient,       - Raymonde Curling will bring food, drink, supplements and symptoms log to next visit for inclusion in their record      - 75 better food list reviewed & given to patient with the omega 6 food list to avoid      - The 52 Latex foods list was reviewed and given to the patients with the information on carrageenan         - Gluten in corn and oats abstracts sheet reviewed and given to the patient today   3.    Greater than 21 minutes time was spent with the patient face to face on this visit; of which >50% was for counseling and coordination of care, as well as the time spent before and after the visit reviewing the chart, documenting the encounter, reviewing labs,reports, NIH listed studies, making phone calls, etc.      Patients food and drinks were reviewed with the patient,   - They will bring a food drink symptom log to future visits for inclusion in their record    - 75 better food list reviewed & given to patient along with the omega 6 food list to avoid      - Gluten in corn and oats abstracts sheet reviewed and given to the patient today    - 23 Foods containing Latex-like proteins was reviewed and copy to be taken if desired     - Nutrient Supplements list provided and copyto be taken if desired    - [email or web address]. [employer] web site offered to patient to review at their convenience by staff with login information    Note:  I have discussed with the patient that with all nutraceuticals, there is often mixed data and emerging research which needs to be monitored; as well as an array of NIH fact sheets on nutrients and supplements, available at www.nih,issue plus [employer]. [employer] plus www.[place]i,org. If I have recommended cinnamon at the request of this patient to assist them in control of their blood sugar, triglyceride, and/or weight issues. I discussed that the patient's clinical use of cinnamon bark, calcium, magnesium, Vitamin D, and pharmaceutical grade CVS omega 3 oil or triple-strength fish oil, and B-50/B-100 time-released B-complex by [id number] [facility] will be for a time-limited trial to determine their individual effectiveness and safety in this patient. I also referred the patient to the NMCD: Nutrition, Metabolism, and Cardiovascular Diseases (SecuritiesCard.pl) and concerns about long-term use and hepatotoxicity of cinnamon and other nutrients. I suggested they frequently search nih.gov for the latest non-proprietary information on nutriceuticals as well as consider a subscription to Drop â€™til you Shop for details on reviewed supplements, or at the least review the nutrient files at [facility] at [facility], [address] bark, an insulin mimetic, reduces some High Carbohydrate Dietary Impacts.   Methylhydroxychalcone polymers insulin-enhancing properties in fat cells are responsible for enhanced glucose uptake, inhibiting hepatic HMG-CoA reductase and lowers lipids. www.jacn. org/content/20/4/327.full     But cinnamon with additives such as Cinnamon Extract are not effective as insulin mimetics.  :eStoreDirectory.at     Nutrients for Start up from [employer] or [employer] for ease to get started now;  Dangelo Casillas has some useable products;  - Triple Strength Fish Oil, enteric coated  - Vit D-3 5000 IU gel caps  - Iron ferrous sulfate 325 mg tabs  - Centrum Silver look-a-like for most patients, or  - Centrum plain look-a-like if need iron    Local pharmacies or chains such as [employer], have:  - [employer] pharmaceutical grade omega 3 is 90% EPA/DHA whereas most Triple strength fish oil are 75% EPA/DHA  - Triple Strength Fish Oil (enteric coated if available) or if not enteric coated, can take from freezer for less burps  - B-50 or B-100 released balanced B complex tabs by [address] [facility] bark 500 mg (without Chromium or extracts)   some brands list 1000 mg / serving of 2 capsules,    some brands have 1000 mg caps with the undesireable chromium extract  - Calcium carbonate/citrate, magnesium oxide/citrate, Vit D-3 as 3-4 tabs/caps/serving     Some Local Brands may contain Zinc which is acceptable for the first bottle or two  - Magnesium oxide 250 mg tabs for those having < 2 bowel movements daily  - Magnesium citrate 200 mg if having > 2 bowel movements/day  - Centrum Silver or look-a-like for most patients, Centrum plain or look-a-like with iron  - Vitamin D-3 comes as 1,000 IU or 2,000 IU or 5,000 IU gel caps or Liquid drops but keep Vitamin D levels <50 but >40     Some brands containing or derived from soy oil or corn oil are OK if not allergic to soy  - Elemental Iron 65 mg tabs at bedtime is available over the counter if need more iron     Usually turns bowel movements grey, green, or black but not a concern  - Apricot Kernel Oil (by Now) for dry skin sensitive perineal or perianal area skin    Nutrients for ongoing use by Mail order for less expense from wwwFlexMinder ;  - Strength Fish Oil , 240 Softgels Item #919579  -B-100 time released balanced B complex Item #[id number]  - Cinnamon bark 500 mg without Chromium or extract Item #647112  - Calcium carbonate 1000 mg, Magnesium oxide 500 mg, Vit D-3 400 IU Item #[id number]  - Magnesium oxide 500 mg tabs Item #285663 if less than 2 bowel movements daily  - [employer] Sen[employer] Item #279700 for most patients, One Daily Item #347225 with iron  - Vit D 3  1,000 Item #638572      2,000 IU Item #224951   Item #190936     Some brands containing orderived from soy oil or corn oil are OK if not allergic to soy    Nutrients for Special Needs by Mail order for less expense from www. puritan.com;  -Elemental Iron 65 mg tabs Item #042574 if need more iron for low iron on labs    Usually turns bowel movements grey, green or black but not a concern  - Time released Niacin 250 mg Item #903504 for cold intolerance, low libido or impotence  - DHEA 50 mg Item #776317 for improving DHEA levels on labs if having Fatigue    If stools too loose substitute for your Magnesium oxide using;   Magnesium citrate 200 mg tabs (NOT liquid) at Hotreader   Magnesium gluconate 550 mg by [place] at [employer] or [employer]. com  Magnesium chloride foot soaks or body sprays  www.Diffbot   Magnesium chloride flakes 14.99 Item #: [id number] if back-ordered, get spray  Magnesium threonate, Magtein also helps mental clarity and sleep    Food Drink Symptom Log;  I asked this patient to track these items and any other symptoms on their list on a weekly basis to documenttheir progress or lack of same.  This can be done on the symptom tracking sheet I gave them at today's visit but looks like this:                                                      Rate on scale of 0-10 with zero = not noticeable  Symptom:                            Week 1               2                 3                 4               Etc            Hair loss    Foot cramps    Paresthesia    Aches    IBS (irritable bowel)    Constipation    Diarrhea  Nocturia (up to bathroom at night)    Fatigue/Energy level  Stress      On the other side of the sheet they can track their food, drink, environment, activity, symptoms etc      Avoiding Latex-like proteins in my foods; Avocados, Bananas, Celery, Figs & Kiwi proteins have latex-like proteins to inflame our immune systems, plus 47 more foods  How Can I Have A Latex Allergy? Eating foods with latex-like protein exposes us to latex allergies. Our body cannot tell the differencebetween these latex-like proteins and latex from rubber products since many people are allergic to fruit, vegetables and latex. Read labels on pre-packaged foods. This list to avoid is only a guide if you are known allergicto latex or have a latex rash on your chin, cheeks and lines on your neck and chest. The amount of latex is different in each food product or fruit variety. Avoid out of Season if not grown locally:   Melon, Nectarine, Papaya, Cherry, Passion fruit, Plum, Chestnuts, and Tomato. Avocado, Banana, Celery, Figs, and Kiwi always contain Latex-like protein. Whats in Season? Strawberries taste better in June than December because June is strawberry season so buy locally grown produce \"in season\" for the best flavor, cost, and less Latex. Locally grown produce not only tastes great but also requires little or no ethylene exposure in food distribution so has less latex content. Out of season: use canned, frozen, or dried since those are processed ripe and latex content is lower!!!     Month     [place] Locally Grown Produce  January, February, March: use canned, frozen or dried fruits since lower in latex  April: asparagus, radishes  May: asparagus, broccoli, green onions, greens, peas, radishes, rhubarb  June: asparagus, beets, beans, broccoli, cabbage, cantaloupe, carrots, green onions, greens, lettuce, onions, parsley, peas, radishes, rhubarb, strawberries, watermelons  July: beans, beets, blueberries, broccoli, cabbage, cantaloupe, carrots, cauliflower, celery, cucumbers, eggplant, grapes, green onions, greens, lettuce, onions, parsley, peas, peaches, bell peppers, potatoes, radishes, summer raspberries, squash, sweetcorn, tomatoes, turnips, watermelons  August: apples, beans, beets, blueberries, cabbage, cantaloupe, carrots, cauliflower, celery, cucumbers, eggplant, grapes, green onions, greens, lettuce, onions, parsley, peas, peaches, pears, bell peppers, potatoes, radishes, squash, sweet corn, tomatoes, turnips, watermelons  September: apples, beans, beets, blueberries, cabbage, cantaloupe, carrots, cauliflower, celery, cucumbers, eggplant, grapes, green onions, greens, lettuce, onions, parsley, peas, peaches, pears, bell peppers, plums, potatoes, pumpkins, radishes, fall red raspberries, squash, sweet corn, tomatoes, turnips, watermelons  October: apples, beets, broccoli, cabbage, carrots, cauliflower, celery, green onions, greens, lettuce, parsley, peas, pears, potatoes, pumpkins, radishes, fall red raspberries, squash, turnips  November: broccoli, cabbage, carrots, parsley, pears, peas  December: use canned, frozen or dried fruits since lower in latex    Upto half of latex-sensitive patients show allergic reactions to fruits (avocados, bananas, kiwifruits, papayas, peaches),   Annals of Allergy, 1994. These plants contain the same proteins that are allergens in latex. People with fruit allergies should warn physicians before undergoing procedures which may cause anaphylactic reaction if in contact with latex gloves.   Some of the common foods with defined cross-reactivity to latex are avocado, banana, kiwi, chestnut, raw potato, tomato, stone fruits (e.g., peach, cherry), hazelnut, melons, celery, carrot, apple, pear, papaya, and almond. Foods with less well-defined cross-reactivity to latex are peanuts, peppers, citrus fruits, coconut, pineapple, dany, fig, passion fruit, Ugli fruit, and grape. This fruit/latex cross-reactivity is worsened by ethylene, a gas used to hasten commercial ripening. In nature, plants produce low levels of the hormone ethylene, which regulates germination, flowering, and ripening. Forced ripening by high ethylene concentrations, plants produce allergenic wound-repair proteins, which are similar to wound-repair proteins made during the tapping of rubber trees. Sensitive individuals who ingest the fruit get a higher dose and worse reaction. Some people may even first become sensitized to latex through fruit. Can food processing increase the concentrations of allergenic proteins? Latex-sensitized children (and adults) in [place] often experience allergic reactions after eating bananas ripened artificially with ethylene. In [facility], food distribution centers treat unripe bananas and other produce with ethylene to ripen; not commonly done in [facility] since fruit is tree-ripened there. Does treatment of food with ethylene induce banana proteins that cross-react with latex? (Sarai et al.)    References:   Latex in Foods Allergy, http://ehp.niehs.nih.gov/members/2003/5811/5811.html    Search web for [employer] in Season \" for where you live or are at the time you food shop   Management of Latex, ://medicalcenter. o[employer].edu/  search for nih, latex-like proteins in foods

## 2022-03-22 LAB — DHEA UNCONJUGATED: 2.17 NG/ML (ref 0.63–4.7)

## 2022-03-23 LAB — TESTOSTERONE, FREE W SHGB, FEMALES/CHILDREN: NORMAL

## 2022-05-18 ENCOUNTER — OFFICE VISIT (OUTPATIENT)
Dept: FAMILY MEDICINE CLINIC | Age: 77
End: 2022-05-18
Payer: MEDICARE

## 2022-05-18 ENCOUNTER — TELEPHONE (OUTPATIENT)
Dept: FAMILY MEDICINE CLINIC | Age: 77
End: 2022-05-18

## 2022-05-18 VITALS
HEIGHT: 64 IN | WEIGHT: 118.4 LBS | OXYGEN SATURATION: 98 % | BODY MASS INDEX: 20.22 KG/M2 | HEART RATE: 68 BPM | DIASTOLIC BLOOD PRESSURE: 60 MMHG | SYSTOLIC BLOOD PRESSURE: 105 MMHG | TEMPERATURE: 96.7 F | RESPIRATION RATE: 16 BRPM

## 2022-05-18 DIAGNOSIS — R69 ILLNESS: Primary | ICD-10-CM

## 2022-05-18 DIAGNOSIS — J02.9 ACUTE PHARYNGITIS, UNSPECIFIED ETIOLOGY: ICD-10-CM

## 2022-05-18 DIAGNOSIS — J40 BRONCHITIS: ICD-10-CM

## 2022-05-18 DIAGNOSIS — R69 ILLNESS: ICD-10-CM

## 2022-05-18 PROCEDURE — 99214 OFFICE O/P EST MOD 30 MIN: CPT | Performed by: NURSE PRACTITIONER

## 2022-05-18 PROCEDURE — 1123F ACP DISCUSS/DSCN MKR DOCD: CPT | Performed by: NURSE PRACTITIONER

## 2022-05-18 PROCEDURE — G8427 DOCREV CUR MEDS BY ELIG CLIN: HCPCS | Performed by: NURSE PRACTITIONER

## 2022-05-18 PROCEDURE — 1090F PRES/ABSN URINE INCON ASSESS: CPT | Performed by: NURSE PRACTITIONER

## 2022-05-18 PROCEDURE — 1036F TOBACCO NON-USER: CPT | Performed by: NURSE PRACTITIONER

## 2022-05-18 PROCEDURE — 4040F PNEUMOC VAC/ADMIN/RCVD: CPT | Performed by: NURSE PRACTITIONER

## 2022-05-18 PROCEDURE — G8400 PT W/DXA NO RESULTS DOC: HCPCS | Performed by: NURSE PRACTITIONER

## 2022-05-18 PROCEDURE — G8420 CALC BMI NORM PARAMETERS: HCPCS | Performed by: NURSE PRACTITIONER

## 2022-05-18 RX ORDER — AZITHROMYCIN 250 MG/1
TABLET, FILM COATED ORAL
Qty: 1 PACKET | Refills: 0 | Status: SHIPPED | OUTPATIENT
Start: 2022-05-18 | End: 2022-06-09

## 2022-05-18 RX ORDER — BROMPHENIRAMINE MALEATE, PSEUDOEPHEDRINE HYDROCHLORIDE, AND DEXTROMETHORPHAN HYDROBROMIDE 2; 30; 10 MG/5ML; MG/5ML; MG/5ML
5 SYRUP ORAL NIGHTLY PRN
Qty: 35 ML | Refills: 0 | Status: SHIPPED | OUTPATIENT
Start: 2022-05-18 | End: 2022-05-25

## 2022-05-18 RX ORDER — BROMPHENIRAMINE MALEATE, PSEUDOEPHEDRINE HYDROCHLORIDE, AND DEXTROMETHORPHAN HYDROBROMIDE 2; 30; 10 MG/5ML; MG/5ML; MG/5ML
5 SYRUP ORAL NIGHTLY PRN
Qty: 1 EACH | Refills: 0 | Status: SHIPPED | OUTPATIENT
Start: 2022-05-18 | End: 2022-05-18 | Stop reason: SDUPTHER

## 2022-05-18 ASSESSMENT — ENCOUNTER SYMPTOMS
COLOR CHANGE: 0
EYE DISCHARGE: 0
CONSTIPATION: 0
SORE THROAT: 1
SINUS PAIN: 1
NAUSEA: 0
COUGH: 1
SHORTNESS OF BREATH: 0
DIARRHEA: 0
BLOOD IN STOOL: 0
ABDOMINAL DISTENTION: 0
EYE REDNESS: 0
ABDOMINAL PAIN: 0
ANAL BLEEDING: 0
RHINORRHEA: 1

## 2022-05-18 NOTE — PATIENT INSTRUCTIONS
Patient Education        Bronchitis: Care Instructions  Your Care Instructions     Bronchitis is inflammation of the bronchial tubes, which carry air to the lungs. The tubes swell and produce mucus, or phlegm. The mucus and inflamedbronchial tubes make you cough. You may have trouble breathing. Most cases of bronchitis are caused by viruses like those that cause colds. Antibiotics usually do not help and they may be harmful. Bronchitis usually develops rapidly and lasts about 2 to 3 weeks in otherwisehealthy people. Follow-up care is a key part of your treatment and safety. Be sure to make and go to all appointments, and call your doctor if you are having problems. It's also a good idea to know your test results and keep alist of the medicines you take. How can you care for yourself at home?  Take all medicines exactly as prescribed. Call your doctor if you think you are having a problem with your medicine.  Get some extra rest.   Take an over-the-counter pain medicine, such as acetaminophen (Tylenol), ibuprofen (Advil, Motrin), or naproxen (Aleve) to reduce fever and relieve body aches. Read and follow all instructions on the label.  Do not take two or more pain medicines at the same time unless the doctor told you to. Many pain medicines have acetaminophen, which is Tylenol. Too much acetaminophen (Tylenol) can be harmful.  Take an over-the-counter cough medicine to help quiet a dry, hacking cough so that you can sleep. Avoid cough medicines that have more than one active ingredient. Read and follow all instructions on the label.  Do not smoke. Smoking can make bronchitis worse. If you need help quitting, talk to your doctor about stop-smoking programs and medicines. These can increase your chances of quitting for good. When should you call for help? Call 911 anytime you think you may need emergency care. For example, call if:     You have severe trouble breathing.    Call your doctor now or seek immediate medical care if:     You have new or worse trouble breathing.      You cough up dark brown or bloody mucus (sputum).      You have a new or higher fever.      You have a new rash. Watch closely for changes in your health, and be sure to contact your doctor if:     You cough more deeply or more often, especially if you notice more mucus or a change in the color of your mucus.      You are not getting better as expected. Where can you learn more? Go to https://Talk Local.ODEC. org and sign in to your Digital Orchid account. Enter H333 in the B-kin Software box to learn more about \"Bronchitis: Care Instructions. \"     If you do not have an account, please click on the \"Sign Up Now\" link. Current as of: July 6, 2021               Content Version: 13.2  © 2006-2022 [email or web address]. Care instructions adapted under license by [facility] ([facility]). If you have questions about a medical condition or this instruction, always ask your healthcare professional. [place] [address] any warranty or liability for your use of this information. Patient Education        Chronic Obstructive Pulmonary Disease (COPD): Care Instructions  Overview     Chronic obstructive pulmonary disease (COPD) is a lung disease that makes it hard to breathe. With COPD, the airways that lead to the lungs are narrowed, and the tiny air sacs in the lungs are damaged and lose their stretch. People with COPD have decreased airflow in and out of the lungs, which makes it hard to breathe. The airways also can get clogged with thick mucus. Cigarettesmoking is a major cause of COPD. Although there is no cure for COPD, you can slow its progress. Following your treatment plan and taking care of yourself can help you feel better and livelonger. Follow-up care is a key part of your treatment and safety. Be sure to make and go to all appointments, and call your doctor if you are having problems.  It's also a good idea to know your test results and keep alist of the medicines you take. How can you care for yourself at home? Staying healthy     Do not smoke. This is the most important step you can take to prevent more damage to your lungs. If you need help quitting, talk to your doctor about stop-smoking programs and medicines. These can increase your chances of quitting for good.      Avoid colds and other infections. Get the pneumococcal and whooping cough (pertussis) vaccines. If you have had these vaccines before, ask your doctor if you need another dose. Get the flu vaccine every fall. If you must be around people with colds or the flu, wash your hands often.      Avoid secondhand smoke and air pollution. Try to stay inside with your windows closed when air pollution is bad. Medicines and oxygen therapy     Take your medicines exactly as prescribed. Call your doctor if you think you are having a problem with your medicine. You may be taking medicines such as:  ? Bronchodilators. These help open your airways and make breathing easier. They are either short-acting (work for 4 to 9 hours) or long-acting (work for 12 to 24 hours). You inhale most bronchodilators, so they start to act quickly. Always carry your quick-relief inhaler with you in case you need it. ? Corticosteroids (prednisone, budesonide). These reduce airway inflammation. They come in inhaled or pill form.      Ask your doctor or pharmacist if you are using each type of inhaler correctly. With correct use, the medicine is more likely to get to your lungs.      See if a spacer is right for you. A spacer may also help you get more inhaled medicine to your lungs. If you use one, ask how to use it properly.      Do not take any vitamins, over-the-counter medicine, or herbal products without talking to your doctor first.      If your doctor prescribed antibiotics, take them as directed. Do not stop taking them just because you feel better. You need to take the full course of antibiotics.      If you use oxygen therapy, use the flow rate your doctor has recommended. Don't change it without talking to your doctor first. Oxygen therapy boosts the amount of oxygen in your blood and helps you breathe easier. Activity     Get regular exercise. Walking is an easy way to get exercise. Start out slowly, and walk a little more each day.      Pay attention to your breathing. You are exercising too hard if you can't talk while you exercise.      Take short rest breaks when doing household chores and other activities.      Learn breathing methods--such as breathing through pursed lips--to help you become less short of breath.      If your doctor has not set you up with a pulmonary rehabilitation program, ask if rehab is right for you. Rehab includes exercise programs, education about your disease and how to manage it, help with diet and other changes, and emotional support. Diet     Eat regular, healthy meals.      Use bronchodilators about 1 hour before you eat to make it easier to eat.      Eat several smaller, frequent meals to prevent getting too full. A full stomach can push on the muscle that helps you breathe (your diaphragm) and make it harder to breathe.      Drink beverages at the end of the meal.      Avoid foods that are hard to chew.      Eat foods that contain protein so you don't lose muscle mass.      Talk with your doctor if you gain too much weight or if you lose weight without trying. Mental health     Talk to your family, friends, or a therapist about your feelings. Some people feel frightened, angry, hopeless, helpless, and even guilty. Talking openly about feelings may help you cope. If these feelings last, talk to your doctor. When should you call for help? Call 911 anytime you think you may need emergency care.  For example, call if:     You have severe trouble breathing.      You are having chest pain that is different or worse than usual.   Call your doctor now or seek immediate medical care if:     You have new or worse trouble breathing.      You cough up blood.      You have a fever.      You have feelings of anxiety or depression. Watch closely for changes in your health, and be sure to contact your doctor if:     You cough more deeply or more often, especially if you notice more mucus or a change in the color of your mucus.      You have new or worse swelling in your legs or belly.      You are not getting better as expected. Where can you learn more? Go to https://ScribbleLivepeshelleyeb.Preisbock. org and sign in to your Light Magic account. Enter S615 in the Modulus Financial Engineering box to learn more about \"Chronic Obstructive Pulmonary Disease (COPD): Care Instructions. \"     If you do not have an account, please click on the \"Sign Up Now\" link. Current as of: July 6, 2021               Content Version: 13.2  © 2006-2022 Tellwiki. Care instructions adapted under license by [facility] ([place] [facility]). If you have questions about a medical condition or this instruction, always ask your healthcare professional. [place] [address] any warranty or liability for your use of this information. Patient Education        Learning About Clearing Your Lungs  How does clearing your lungs help you breathe? When you have too much mucus in your lungs, learning to clear your lungs may help you save energy and improve your breathing. It may also help prevent lunginfections. Here are three ways to clear your lungs:   Postural drainage   Chest and back percussion   Controlled coughing  How do you do postural drainage? Postural drainage means lying down in different positions to help drain mucusfrom your lungs. Hold each position for at least 3 minutes. Your doctor or therapist will tell you how long. Do it about 30 minutes after you use your inhaler. Make sure youhave an empty stomach.  If you allergies, and yelling can also cause a sore throat. Sore throats can be painful and annoying. Fortunately, most sore throats go away on their own. If you have a bacterial infection, your doctor may prescribeantibiotics. Follow-up care is a key part of your treatment and safety. Be sure to make and go to all appointments, and call your doctor if you are having problems. It's also a good idea to know your test results and keep alist of the medicines you take. How can you care for yourself at home?  If your doctor prescribed antibiotics, take them as directed. Do not stop taking them just because you feel better. You need to take the full course of antibiotics.  Gargle with warm salt water several times a day to help reduce swelling and relieve pain. Mix 1/2 teaspoon of salt in 1 cup of warm water.  Take an over-the-counter pain medicine, such as acetaminophen (Tylenol), ibuprofen (Advil, Motrin), or naproxen (Aleve). Read and follow all instructions on the label.  Be careful when taking over-the-counter cold or flu medicines and Tylenol at the same time. Many of these medicines have acetaminophen, which is Tylenol. Read the labels to make sure that you are not taking more than the recommended dose. Too much acetaminophen (Tylenol) can be harmful.  Drink plenty of fluids. Fluids may help soothe an irritated throat. Hot fluids, such as tea or soup, may help decrease throat pain.  Use over-the-counter throat lozenges to soothe pain. Regular cough drops or hard candy may also help. These should not be given to young children because of the risk of choking.  Do not smoke or allow others to smoke around you. If you need help quitting, talk to your doctor about stop-smoking programs and medicines. These can increase your chances of quitting for good.  Use a vaporizer or humidifier to add moisture to your bedroom. Follow the directions for cleaning the machine. When should you call for help?    Call your doctor now or seek immediate medical care if:     You have trouble breathing.      Your sore throat gets much worse on one side.      You have new or worse trouble swallowing.      You have a new or higher fever. Watch closely for changes in your health, and be sure to contact your doctor ifyou do not get better as expected. Where can you learn more? Go to https://[email or web address].Southern [email or web address]. org and sign in to your Captual account. Enter M241 in the Polyheal box to learn more about \"Sore Throat: Care Instructions. \"     If you do not have an account, please click on the \"Sign Up Now\" link. Current as of: September 8, 2021               Content Version: 13.2  © 2006-2022 Healthwise, Incorporated. Care instructions adapted under license by [facility] ([facility]). If you have questions about a medical condition or this instruction, always ask your healthcare professional. [address] any warranty or liability for your use of this information.

## 2022-05-18 NOTE — TELEPHONE ENCOUNTER
Ba Fuchs, APRN - CNP    brompheniramine-pseudoephedrine-DM (BROMFED DM) 2-30-10 MG/5ML syrup [[id number]]     Order Details  Dose: 5 mL Route: Oral Frequency: NIGHTLY PRN for Cough   Dispense Quantity: 1 each Refills: 0      Quantity needs to have an ml amount on script. Please Advise.

## 2022-05-18 NOTE — PROGRESS NOTES
[address] [facility]  [phone] (phone)  [phone] (fax)    Visit Date: 5/18/2022    Anita Paniagua is a 69 y.o. female who presents today for:  Chief Complaint   Patient presents with    Fall     on monday     Dizziness     started on sunday     Cough     started on sunday     Hoarse     HPI:     Cough, sore throat, and dizziness - started Saturday night - did have a fever - 101 on Sunday, body aches. Took home covid test - Sunday - negative. No known exposures. Has tried OC cold medication - just started yesterday.     Had a fall on Monday - at home - fell off 3 steps and hit left shoulder - doing fine    HPI  Health Maintenance   Topic Date Due    Shingles vaccine (1 of 2) Never done   [employer] Visit (AWV)  11/04/2021    Depression Screen  06/08/2022    DTaP/Tdap/Td vaccine (2 - Td or Tdap) 10/12/2030    DEXA (modify frequency per FRAX score)  Completed    Flu vaccine  Completed    Pneumococcal 65+ years Vaccine  Completed    COVID-19 Vaccine  Completed    Hepatitis C screen  Completed    Hepatitis A vaccine  Aged Out    Hepatitis B vaccine  Aged Out    Hib vaccine  Aged Out    Meningococcal (ACWY) vaccine  Aged Out     Past Medical History:   Diagnosis Date    Cancer [facility])       Past Surgical History:   Procedure Laterality Date    BREAST SURGERY      FINGER TRIGGER RELEASE Right 2017     Family History   Problem Relation Age of Onset    Diabetes Mother     Cancer Mother         colon    High Blood Pressure Mother     No Known Problems Father     Breast Cancer Sister     High Blood Pressure Brother     No Known Problems Brother     No Known Problems Brother      Social History     Tobacco Use    Smoking status: Never Smoker    Smokeless tobacco: Never Used   Substance Use Topics    Alcohol use: No      Current Outpatient Medications   Medication Sig Dispense Refill    azithromycin (ZITHROMAX) 250 MG tablet Take 2 tabs (500 mg) on Day 1, and take 1 tab (250 mg) on days 2 through 5. 1 packet 0    brompheniramine-pseudoephedrine-DM (BROMFED DM) 2-30-10 MG/5ML syrup Take 5 mLs by mouth nightly as needed for Cough 1 each 0    NONFORMULARY Take by mouth multidophilus      zoster recombinant adjuvanted vaccine (SHINGRIX) 50 MCG/0.5ML SUSR injection Inject 0.5 mLs into the muscle See Admin Instructions 1 dose now and repeat in 2-6 months 0.5 mL 0    NONFORMULARY Take 1 capsule by mouth 2 times daily (with meals) [facility] at [facility] phosphatidyl serine + bacopa monnieri       Coenzyme Q10-Red Yeast Rice  MG CAPS Take 1 capsule by mouth 2 times daily (before meals) Supper and bedtime      letrozole (FEMARA) 2.5 MG tablet Take 1 tablet by mouth daily  3    NONFORMULARY Take 1 capsule by mouth 3 times daily Magtein by Source Natural      B Complex-Folic Acid (O-866 BALANCED TR PO) Take 1 tablet by mouth 3 times daily (before meals) [id number] [facility] Potassium 99 MG TABS Take 1 capsule by mouth every other day       diphenhydrAMINE-PE-APAP (ALLERGY RELIEF PLUS SINUS) 25-5-325 MG TABS Take by mouth as needed      vitamin D (CHOLECALCIFEROL) 5000 units CAPS capsule Take 5,000 Units by mouth daily Skip Sunday      Cinnamon 500 MG CAPS Take 500 mg by mouth 3 times daily       Ginger, Zingiber officinalis, (GINGER ROOT) 500 MG CAPS Take 1 capsule by mouth daily      Turmeric Curcumin 500 MG CAPS Take 1 capsule by mouth 2 times daily (with meals)       melatonin 3 MG TABS tablet Take 5 mg by mouth nightly as needed      Garlic 809 MG CAPS Take 1 capsule by mouth 2 times daily      Calcium Carbonate-Vit D-Min (CALCIUM 1200 PO) Take 1 tablet by mouth daily (before lunch)       Omega-3 Fatty Acids (FISH OIL) 1200 MG CAPS Take 3 capsules by mouth 4 times daily (before meals and nightly) [facility] pharmaceutical grade      vitamin E 400 UNIT capsule Take 400 Units by mouth daily      vitamin B-6 (PYRIDOXINE) 50 MG tablet Take 50 mg by mouth daily  Cyanocobalamin (VITAMIN B-12) 3000 MCG SUBL Place 2 tablets under the tongue daily       Biotin 2500 MCG CAPS Take 1 tablet by mouth every other day       NONFORMULARY Take 1 capsule by mouth daily as needed [place]a Resp usually used in October      NONFORMULARY Take 1 capsule by mouth 2 times daily as needed Respiration Blend SP-3 usually used in October      Olive Leaf Extract 250 MG CAPS Take 1 capsule by mouth 2 times daily      Cetirizine HCl (ZYRTEC ALLERGY) 10 MG CAPS Take by mouth nightly       Loratadine (CLARITIN) 10 MG CAPS Take 10 mg by mouth as needed        No current facility-administered medications for this visit. Allergies   Allergen Reactions    Gluten Meal Other (See Comments)     Gluten,fruit, and sugar causes blisters of mouth    Orange Oil Other (See Comments)     Any acidic fruit    Pcn [Penicillins] Other (See Comments)     Unknown  Childhood reaction. Subjective:    Review of Systems   Constitutional: Negative for chills, fatigue and fever. HENT: Positive for congestion, ear pain, postnasal drip, rhinorrhea, sinus pain and sore throat. Eyes: Negative for discharge and redness. Respiratory: Positive for cough. Negative for shortness of breath. Cardiovascular: Negative for chest pain and leg swelling. Gastrointestinal: Negative for abdominal distention, abdominal pain, anal bleeding, blood in stool, constipation, diarrhea and nausea. Skin: Negative for color change and rash. Neurological: Negative for facial asymmetry, speech difficulty and weakness. Hematological: Does not bruise/bleed easily. Psychiatric/Behavioral: Negative for agitation and confusion. Objective:     Vitals:    05/18/22 0840   BP: 105/60   Site: Right Upper Arm   Position: Sitting   Cuff Size: Medium Adult   Pulse: 68   Resp: 16   Temp: 96.7 °F (35.9 °C)   SpO2: 98%   Weight: 118 lb 6.4 oz (53.7 kg)   Height: 5' 4\" (1.626 m)       Body mass index is 20.32 kg/m².     Wt Readings from Last 3 Encounters:   05/18/22 118 lb 6.4 oz (53.7 kg)   03/17/22 119 lb 3.2 oz (54.1 kg)   03/08/22 117 lb (53.1 kg)     BP Readings from Last 3 Encounters:   05/18/22 105/60   03/17/22 108/62   03/08/22 136/70     Physical Exam  Constitutional:       General: She is not in acute distress. Appearance: She is well-developed. She is not ill-appearing or diaphoretic. HENT:      Head: Normocephalic and atraumatic. Right Ear: Hearing and external ear normal. No decreased hearing noted. A middle ear effusion is present. Left Ear: Hearing and external ear normal. No decreased hearing noted. A middle ear effusion is present. Nose: Mucosal edema, congestion and rhinorrhea present. No nasal deformity. Mouth/Throat:      Pharynx: Posterior oropharyngeal erythema present. Eyes:      General:         Right eye: No discharge. Left eye: No discharge. Conjunctiva/sclera: Conjunctivae normal.   Pulmonary:      Effort: Pulmonary effort is normal. No respiratory distress. Breath sounds: No wheezing. Abdominal:      General: There is no distension. Tenderness: There is no guarding. Musculoskeletal:         General: No tenderness or deformity. Normal range of motion. Cervical back: Normal range of motion and neck supple. Skin:     Coloration: Skin is not pale. Findings: No erythema or rash (On exposed areas). Neurological:      Mental Status: She is alert. Gait: Gait normal.   Psychiatric:         Speech: Speech normal.         Behavior: Behavior normal.         Thought Content:  Thought content normal.         Judgment: Judgment normal.         Lab Results   Component Value Date    WBC 4.8 03/15/2022    HGB 12.0 03/15/2022    HCT 36.9 (L) 03/15/2022     03/15/2022    CHOL 241 (H) 03/15/2022    TRIG 66 03/15/2022    HDL 90 03/15/2022    LDLCALC 138 03/15/2022    AST 19 01/11/2022     01/11/2022    K 4.4 01/11/2022     01/11/2022 CREATININE 0.9 01/11/2022    BUN 29 (H) 01/11/2022    CO2 24 01/11/2022    TSH 1.680 12/08/2021    LABA1C 5.5 06/08/2021    LABGLOM 61 (A) 01/11/2022    MG 2.1 03/15/2022    CALCIUM 9.5 03/15/2022    VITD25 55 03/15/2022     Assessment:       Diagnosis Orders   1. Illness  COVID-19    Rapid Influenza A/B Antigens    brompheniramine-pseudoephedrine-DM (BROMFED DM) 2-30-10 MG/5ML syrup   2. Acute pharyngitis, unspecified etiology  azithromycin (ZITHROMAX) 250 MG tablet    brompheniramine-pseudoephedrine-DM (BROMFED DM) 2-30-10 MG/5ML syrup   3. Bronchitis  azithromycin (ZITHROMAX) 250 MG tablet    brompheniramine-pseudoephedrine-DM (BROMFED DM) 2-30-10 MG/5ML syrup       Plan:   Hafsa Ortega was seen today for fall, dizziness, cough and hoarse. Diagnoses and all orders for this visit:    Illness  -     COVID-19; Future  -     Rapid Influenza A/B Antigens; Future  -     brompheniramine-pseudoephedrine-DM (BROMFED DM) 2-30-10 MG/5ML syrup; Take 5 mLs by mouth nightly as needed for Cough    Acute pharyngitis, unspecified etiology  -     azithromycin (ZITHROMAX) 250 MG tablet; Take 2 tabs (500 mg) on Day 1, and take 1 tab (250 mg) on days 2 through 5.  -     brompheniramine-pseudoephedrine-DM (BROMFED DM) 2-30-10 MG/5ML syrup; Take 5 mLs by mouth nightly as needed for Cough    Bronchitis  -     azithromycin (ZITHROMAX) 250 MG tablet; Take 2 tabs (500 mg) on Day 1, and take 1 tab (250 mg) on days 2 through 5.  -     brompheniramine-pseudoephedrine-DM (BROMFED DM) 2-30-10 MG/5ML syrup; Take 5 mLs by mouth nightly as needed for Cough        No follow-ups on file. Orders Placed:  Orders Placed This Encounter   Procedures    Rapid Influenza A/B Antigens    COVID-19     Medications Prescribed:  Orders Placed This Encounter   Medications    azithromycin (ZITHROMAX) 250 MG tablet     Sig: Take 2 tabs (500 mg) on Day 1, and take 1 tab (250 mg) on days 2 through 5.      Dispense:  1 packet     Refill:  0    brompheniramine-pseudoephedrine-DM (BROMFED DM) 2-30-10 MG/5ML syrup     Sig: Take 5 mLs by mouth nightly as needed for Cough     Dispense:  1 each     Refill:  0     Future Appointments   Date Time Provider Nuzhat Bangi   9/15/2022  3:40 PM MATTHEW Barnes CNP [facility] [place]   9/19/2022  8:00 AM [facility] [place]      Patient given educational materials - see patient instructions. Discussed use, benefit, and side effects of prescribedmedications. All patient questions answered. Pt voiced understanding. Reviewed health maintenance. Instructed to continue current medications, diet and exercise. Patient agreed with treatment plan. Follow up as directed.     Electronically signed by MATTHEW Barnes CNP on 5/18/2022 at 11:56 AM

## 2022-05-18 NOTE — LETTER
[address] [place] [address],[address] [facility] [address] [place] [address]  Phone: [phone]  Fax: [address] [facility], MATTHEW - CNP        May 18, 2022     Patient: Mekhi Roy   YOB: 1945   Date of Visit: 5/18/2022       To Whom It May Concern: It is my medical opinion that Eder Rob may return to work on 05/23/2022. If you have any questions or concerns, please don't hesitate to call.     Sincerely,        MATTHEW El CNP

## 2022-05-20 ENCOUNTER — TELEPHONE (OUTPATIENT)
Dept: FAMILY MEDICINE CLINIC | Age: 77
End: 2022-05-20

## 2022-05-20 DIAGNOSIS — R69 ILLNESS: Primary | ICD-10-CM

## 2022-05-20 RX ORDER — BENZONATATE 200 MG/1
200 CAPSULE ORAL 3 TIMES DAILY PRN
Qty: 30 CAPSULE | Refills: 0 | Status: SHIPPED | OUTPATIENT
Start: 2022-05-20 | End: 2022-05-27

## 2022-05-20 NOTE — TELEPHONE ENCOUNTER
Ba Fuchs, APRN - KRISTIN [facility] [employer] [facility] called and states that the Bromfed does not seem to be helping that she is still coughing now having a sore throat due to all the coughing and unable to get sleep and while talking to me she had a hard time talking due to all the coughing in between her words. If calling in a new script Please send to [address] [place] [address].   Please Advise

## 2022-05-23 ENCOUNTER — HOSPITAL ENCOUNTER (EMERGENCY)
Age: 77
Discharge: HOME | End: 2022-05-23
Payer: MEDICARE

## 2022-05-23 VITALS
WEIGHT: 113.38 LBS | OXYGEN SATURATION: 97 % | BODY MASS INDEX: 19.46 KG/M2 | DIASTOLIC BLOOD PRESSURE: 66 MMHG | SYSTOLIC BLOOD PRESSURE: 143 MMHG | HEART RATE: 72 BPM | RESPIRATION RATE: 16 BRPM | TEMPERATURE: 97.1 F

## 2022-05-23 DIAGNOSIS — J32.9 RHINOSINUSITIS: ICD-10-CM

## 2022-05-23 DIAGNOSIS — J31.0 RHINOSINUSITIS: ICD-10-CM

## 2022-05-23 DIAGNOSIS — J04.0 LARYNGITIS: ICD-10-CM

## 2022-05-23 DIAGNOSIS — J30.9 ALLERGIC RHINITIS, UNSPECIFIED SEASONALITY, UNSPECIFIED TRIGGER: Primary | ICD-10-CM

## 2022-05-23 PROCEDURE — 99213 OFFICE O/P EST LOW 20 MIN: CPT | Performed by: EMERGENCY MEDICINE

## 2022-05-23 PROCEDURE — 99213 OFFICE O/P EST LOW 20 MIN: CPT

## 2022-05-23 RX ORDER — PREDNISONE 20 MG/1
40 TABLET ORAL DAILY
Qty: 5 TABLET | Refills: 0 | Status: SHIPPED | OUTPATIENT
Start: 2022-05-23 | End: 2022-05-28

## 2022-05-23 RX ORDER — PSEUDOEPHEDRINE HYDROCHLORIDE 30 MG/1
60 TABLET ORAL EVERY 6 HOURS PRN
Qty: 20 TABLET | Refills: 1 | Status: SHIPPED | OUTPATIENT
Start: 2022-05-23 | End: 2022-05-30

## 2022-05-23 RX ORDER — FLUTICASONE PROPIONATE 50 MCG
1 SPRAY, SUSPENSION (ML) NASAL DAILY
Qty: 16 G | Refills: 0 | Status: SHIPPED | OUTPATIENT
Start: 2022-05-23

## 2022-05-23 ASSESSMENT — ENCOUNTER SYMPTOMS
RHINORRHEA: 1
DIARRHEA: 0
SORE THROAT: 1
VOMITING: 0
VOICE CHANGE: 1
NAUSEA: 0
ABDOMINAL PAIN: 0
SHORTNESS OF BREATH: 0
COUGH: 1

## 2022-05-23 ASSESSMENT — PAIN - FUNCTIONAL ASSESSMENT: PAIN_FUNCTIONAL_ASSESSMENT: NONE - DENIES PAIN

## 2022-05-23 NOTE — ED TRIAGE NOTES
Pt to room 3 with c/o a cough, hoarse voice and some dizziness x 5 days. She states she was seen at her PCP last week for similar symptoms and prescribed Zithromax and Bromfed cough syrup. It appears on the med list that Tesslon Pearls were ordered as well but never picked up.

## 2022-05-23 NOTE — Clinical Note
Alexy Guajardo was seen and treated in our emergency department on 5/23/2022. She may return to work on 05/25/2022. If you have any questions or concerns, please don't hesitate to call.       Yovana Uribe, MATTHEW - CNP

## 2022-05-23 NOTE — ED PROVIDER NOTES
[facility]  Urgent Care Encounter       CHIEF COMPLAINT       Chief Complaint   Patient presents with    Cough    Headache    Dizziness       Nurses Notes reviewed and I agree except as noted in the HPI. HISTORY OF PRESENT ILLNESS   Jesse Miranda is a 69 y.o. female who presents for complaints of sinus and nasal congestion, postnasal drip, sore throat, loss of voice, occasional cough. Denies shortness of breath. No fevers, body aches or chills. Patient was seen by her primary care provider for the same concerns last week and was placed on Bromfed, Zithromax and Tessalon. Patient did not get the Tessalon filled but had no relief of symptoms with the other 2 medications. She is on daily Zyrtec. She performs Gunjan pot sinus rinses      HPI    REVIEW OF SYSTEMS     Review of Systems   Constitutional: Negative for chills, fatigue and fever. HENT: Positive for congestion, rhinorrhea, sore throat and voice change. Negative for ear discharge and ear pain. Respiratory: Positive for cough. Negative for shortness of breath. Cardiovascular: Negative for chest pain. Gastrointestinal: Negative for abdominal pain, diarrhea, nausea and vomiting. Neurological: Negative for dizziness and headaches. Psychiatric/Behavioral: Negative for behavioral problems. PAST MEDICAL HISTORY         Diagnosis Date    Cancer [facility])        SURGICALHISTORY     Patient  has a past surgical history that includes Breast surgery and Finger trigger release (Right, 2017).     CURRENT MEDICATIONS       Discharge Medication List as of 5/23/2022 10:14 AM      CONTINUE these medications which have NOT CHANGED    Details   benzonatate (TESSALON) 200 MG capsule Take 1 capsule by mouth 3 times daily as needed for Cough, Disp-30 capsule, R-0Normal      azithromycin (ZITHROMAX) 250 MG tablet Take 2 tabs (500 mg) on Day 1, and take 1 tab (250 mg) on days 2 through 5., Disp-1 packet, R-0Normal brompheniramine-pseudoephedrine-DM (BROMFED DM) 2-30-10 MG/5ML syrup Take 5 mLs by mouth nightly as needed for Cough, Disp-35 mL, R-0Normal      !! NONFORMULARY Take by mouth multidophilusHistorical Med      zoster recombinant adjuvanted vaccine (SHINGRIX) 50 MCG/0.5ML SUSR injection Inject 0.5 mLs into the muscle See Admin Instructions 1 dose now and repeat in 2-6 months, Disp-0.5 mL, R-0Print      !! NONFORMULARY Take 1 capsule by mouth 2 times daily (with meals) [facility] at [facility] phosphatidyl serine + bacopa monnieri Historical Med      Coenzyme Q10-Red Yeast Rice  MG CAPS Take 1 capsule by mouth 2 times daily (before meals) Supper and bedtimeHistorical Med      letrozole (FEMARA) 2.5 MG tablet Take 1 tablet by mouth daily, R-3Historical Med      !! NONFORMULARY Take 1 capsule by mouth 3 times daily [facility] by Source NaturalHistorical Med      B Complex-Folic Acid (R-935 BALANCED TR PO) Take 1 tablet by mouth 3 times daily (before meals) [address] [facility] [address]      Potassium 99 MG TABS Take 1 capsule by mouth every other day Historical Med      diphenhydrAMINE-PE-APAP (ALLERGY RELIEF PLUS SINUS) 25-5-325 MG TABS Take by mouth as neededHistorical Med      vitamin D (CHOLECALCIFEROL) 5000 units CAPS capsule Take 5,000 Units by mouth daily Skip SundayHistorical Med      Cinnamon 500 MG CAPS Take 500 mg by mouth 3 times daily Historical Med      Ginger, Zingiber officinalis, (GINGER ROOT) 500 MG CAPS Take 1 capsule by mouth dailyHistorical Med      Turmeric Curcumin 500 MG CAPS Take 1 capsule by mouth 2 times daily (with meals) Historical Med      melatonin 3 MG TABS tablet Take 5 mg by mouth nightly as neededHistorical Med      Garlic 678 MG CAPS Take 1 capsule by mouth 2 times dailyHistorical Med      Calcium Carbonate-Vit D-Min (CALCIUM 1200 PO) Take 1 tablet by mouth daily (before lunch) Historical Med      Omega-3 Fatty Acids (FISH OIL) 1200 MG CAPS Take 3 capsules by mouth 4 times daily (before meals and nightly) [facility] pharmaceutical gradeHistorical Med      vitamin E 400 UNIT capsule Take 400 Units by mouth dailyHistorical Med      vitamin B-6 (PYRIDOXINE) 50 MG tablet Take 50 mg by mouth daily Historical Med      Cyanocobalamin (VITAMIN B-12) 3000 MCG SUBL Place 2 tablets under the tongue daily Historical Med      Biotin 2500 MCG CAPS Take 1 tablet by mouth every other day Historical Med      !! NONFORMULARY Take 1 capsule by mouth daily as needed Orega Resp usually used in [facility] Med      !! NONFORMULARY Take 1 capsule by mouth 2 times daily as needed Respiration Blend SP-3 usually used in OctoberHistorical Med      Olive Leaf Extract 250 MG CAPS Take 1 capsule by mouth 2 times dailyHistorical Med      Cetirizine HCl (ZYRTEC ALLERGY) 10 MG CAPS Take by mouth nightly Historical Med      Loratadine (CLARITIN) 10 MG CAPS Take 10 mg by mouth as needed Historical Med       !! - Potential duplicate medications found. Please discuss with provider. ALLERGIES     Patient is is allergic to gluten meal, orange oil, and pcn [penicillins].     Patients   Immunization History   Administered Date(s) Administered    COVID-19, Moderna, Primary or Immunocompromised, PF, 100mcg/0.5mL 02/19/2021, 02/19/2021, 03/19/2021, 03/19/2021    COVID-19, Pfizer Purple top, DILUTE for use, 12+ yrs, 30mcg/0.3mL dose 11/20/2021    Influenza Whole 12/05/2008    Influenza, High Dose (Fluzone 65 yrs and older) 12/07/2016, 10/24/2017, 10/11/2018, 08/17/2020    Influenza, High-dose, Quadv, 65 yrs +, IM (Fluzone) 08/17/2020    Influenza, MDCK Quadv, with preserv IM (Flucelvax 2 yrs and older) 10/17/2019, 10/17/2019    Pneumococcal Conjugate 13-valent (Vcwxisr21) 11/05/2018    Pneumococcal Polysaccharide (Eqvoosrre21) 11/03/2020    Tdap (Boostrix, Adacel) 10/12/2020       FAMILY HISTORY     Patient's family history includes Breast Cancer in her sister; Cancer in her mother; Diabetes in her mother; High Blood Pressure in her brother and mother; No Known Problems in her brother, brother, and father. SOCIAL HISTORY     Patient  reports that she has never smoked. She has never used smokeless tobacco. She reports that she does not drink alcohol and does not use drugs. PHYSICAL EXAM     ED TRIAGE VITALS  BP: (!) 143/66, Temp: 97.1 °F (36.2 °C), Pulse: 72, Resp: 16, SpO2: 97 %,Estimated body mass index is 19.46 kg/m² as calculated from the following:    Height as of 5/18/22: 5' 4\" (1.626 m). Weight as of this encounter: 113 lb 6 oz (51.4 kg). ,No LMP recorded. Patient is postmenopausal.    Physical Exam  Constitutional:       Appearance: She is ill-appearing. She is not toxic-appearing. HENT:      Head: Normocephalic and atraumatic. Right Ear: Tympanic membrane, ear canal and external ear normal.      Left Ear: Tympanic membrane, ear canal and external ear normal.      Nose: Congestion and rhinorrhea present. Mouth/Throat:      Mouth: Mucous membranes are moist.      Pharynx: Oropharynx is clear. No oropharyngeal exudate or posterior oropharyngeal erythema. Cardiovascular:      Rate and Rhythm: Normal rate and regular rhythm. Pulses: Normal pulses. Heart sounds: Normal heart sounds. Pulmonary:      Effort: Pulmonary effort is normal.      Breath sounds: Normal breath sounds. Skin:     General: Skin is warm and dry. Neurological:      General: No focal deficit present. Mental Status: She is alert. Psychiatric:         Mood and Affect: Mood normal.         DIAGNOSTIC RESULTS     Labs:No results found for this visit on 05/23/22. IMAGING:    No orders to display         EKG:      URGENT CARE COURSE:     Vitals:    05/23/22 0942   BP: (!) 143/66   Pulse: 72   Resp: 16   Temp: 97.1 °F (36.2 °C)   TempSrc: Temporal   SpO2: 97%   Weight: 113 lb 6 oz (51.4 kg)       Medications - No data to display         PROCEDURES:  None    FINAL IMPRESSION      1.  Allergic rhinitis, unspecified seasonality, unspecified trigger    2. Rhinosinusitis    3. Laryngitis          DISPOSITION/ PLAN     Presents for was likely seasonal allergy rhinosinusitis. Laryngitis. Likely viral in nature. Patient has completed a course of azithromycin with no improvement of symptoms. I will have patient continue her antihistamine. Flonase nasal spray. Prednisone. Sudafed Tessalon as previously prescribed for her cough. Drink plenty of fluids. Return for new or worsening symptoms.       PATIENT REFERRED TO:  MATTHEW Palomino CNP  [place] [address] / [facility] [address]      DISCHARGE MEDICATIONS:  Discharge Medication List as of 5/23/2022 10:14 AM      START taking these medications    Details   predniSONE (DELTASONE) 20 MG tablet Take 2 tablets by mouth daily for 5 days, Disp-5 tablet, R-0Normal      fluticasone (FLONASE) 50 MCG/ACT nasal spray 1 spray by Each Nostril route daily, Disp-16 g, R-0Normal      pseudoephedrine (DECONGESTANT) 30 MG tablet Take 2 tablets by mouth every 6 hours as needed for Congestion, Disp-20 tablet, R-1Normal             Discharge Medication List as of 5/23/2022 10:14 AM          Discharge Medication List as of 5/23/2022 10:14 AM          MATTHEW Chris CNP    (Please note that portions of this note were completed with a voice recognition program. Efforts were made to edit the dictations but occasionally words are mis-transcribed.)          MATTHEW Chris CNP  05/23/22 9313

## 2022-05-24 ENCOUNTER — TELEPHONE (OUTPATIENT)
Dept: FAMILY MEDICINE CLINIC | Age: 77
End: 2022-05-24

## 2022-05-24 NOTE — LETTER
[facility]  [address], [address]  Phone: [phone]  Fax: [phone]    May 24, 2022    Kathie Javier  [address] [place] [address]      Dear Nikki Sydney,    This letter is regarding your Emergency Department (ED) visit at [facility] on 5/23/22. Dr. Subhash Negron wanted to make sure that you understand your discharge instructions and that you were able to fill any prescriptions that may have been ordered for you. Please contact the office at the above phone number if the ED advised you to follow up with Dr. Subhash Negron, or if you have any further questions or needs. Also did you know -   *Visiting the ED for a non-emergency could result in higher co-pays than you would normally be subject to paying? *You can call your doctor even after hours so they can direct you to the most appropriate care. [facility]) practices can often offer you an appointment on the same day that you call. *We have some [facility] offices that offer Walk-in appointments; check our website for availability in your community, [email or web address].      *Evisits are now available for patients for $36 through [employer] for certain conditions:  * Sinus, cold and or cough       * Diarrhea            * Headache  * Heartburn                                * Poison Grisel          * Back pain     * Urinary problems                         If you do not have [employer] and are interested, please contact the office and a staff member may assist you or go to [email or web address].     Sincerely,     MATTHEW Fisher - CNP and your [facility]

## 2022-06-09 ENCOUNTER — HOSPITAL ENCOUNTER (EMERGENCY)
Age: 77
Discharge: HOME | End: 2022-06-09
Payer: MEDICARE

## 2022-06-09 VITALS
DIASTOLIC BLOOD PRESSURE: 62 MMHG | SYSTOLIC BLOOD PRESSURE: 109 MMHG | OXYGEN SATURATION: 96 % | RESPIRATION RATE: 16 BRPM | TEMPERATURE: 97.3 F | HEART RATE: 96 BPM

## 2022-06-09 DIAGNOSIS — R42 DIZZINESS: ICD-10-CM

## 2022-06-09 DIAGNOSIS — J30.2 SEASONAL ALLERGIES: Primary | ICD-10-CM

## 2022-06-09 PROCEDURE — 99212 OFFICE O/P EST SF 10 MIN: CPT | Performed by: EMERGENCY MEDICINE

## 2022-06-09 PROCEDURE — 99213 OFFICE O/P EST LOW 20 MIN: CPT

## 2022-06-09 RX ORDER — PSEUDOEPHEDRINE HYDROCHLORIDE 30 MG/1
30 TABLET ORAL EVERY 4 HOURS PRN
COMMUNITY

## 2022-06-09 RX ORDER — MECLIZINE HYDROCHLORIDE 25 MG/1
25 TABLET ORAL 3 TIMES DAILY PRN
Qty: 30 TABLET | Refills: 0 | Status: SHIPPED | OUTPATIENT
Start: 2022-06-09 | End: 2022-06-19

## 2022-06-09 ASSESSMENT — ENCOUNTER SYMPTOMS
SINUS PRESSURE: 0
SINUS PAIN: 0
RHINORRHEA: 1
SHORTNESS OF BREATH: 0
COUGH: 0

## 2022-06-09 ASSESSMENT — PAIN - FUNCTIONAL ASSESSMENT: PAIN_FUNCTIONAL_ASSESSMENT: NONE - DENIES PAIN

## 2022-06-09 NOTE — ED PROVIDER NOTES
[facility]  Urgent Care Encounter       CHIEF COMPLAINT       Chief Complaint   Patient presents with    Sinus Problem       Nurses Notes reviewed and I agree except as noted in the HPI. HISTORY OF PRESENT ILLNESS   Carie Poole is a 69 y.o. female who presents for complaints of postnasal drip, sinus drainage, ears are itching. She states dizziness began today. She does have a history of vertigo in the past.  She reports she has been taking over-the-counter Sudafed on occasion, she is continuing to use her Flonase. And reports she is taking a 24-hour allergy pill but is not sure of the name of the medication. She has meclizine at home but has not tried taking this medication. Patient denies any headaches. Problems with speech. No weakness to the extremities, visual difficulties, headaches or other strokelike symptoms. HPI    REVIEW OF SYSTEMS     Review of Systems   Constitutional: Negative for activity change, fatigue and fever. HENT: Positive for congestion, ear pain (itching) and rhinorrhea. Negative for sinus pressure and sinus pain. Respiratory: Negative for cough and shortness of breath. Cardiovascular: Negative for chest pain. Neurological: Positive for dizziness. Negative for facial asymmetry, speech difficulty, light-headedness and headaches. Psychiatric/Behavioral: Negative for behavioral problems. PAST MEDICAL HISTORY         Diagnosis Date    Cancer [facility])        SURGICALHISTORY     Patient  has a past surgical history that includes Breast surgery and Finger trigger release (Right, 2017).     CURRENT MEDICATIONS       Previous Medications    B COMPLEX-FOLIC ACID (E-452 BALANCED TR PO)    Take 1 tablet by mouth 3 times daily (before meals) [address] [facility] [place] [address] 2500 MCG CAPS    Take 1 tablet by mouth every other day     CALCIUM CARBONATE-VIT D-MIN (CALCIUM 1200 PO)    Take 1 tablet by mouth daily (before lunch)     CETIRIZINE HCL (ZYRTEC ALLERGY) 10 MG CAPS    Take by mouth nightly     CINNAMON 500 MG CAPS    Take 500 mg by mouth 3 times daily     COENZYME Q10-RED YEAST RICE  MG CAPS    Take 1 capsule by mouth 2 times daily (before meals) Supper and bedtime    CYANOCOBALAMIN (VITAMIN B-12) 3000 MCG SUBL    Place 2 tablets under the tongue daily     DIPHENHYDRAMINE-PE-APAP (ALLERGY RELIEF PLUS SINUS) 25-5-325 MG TABS    Take by mouth as needed    FLUTICASONE (FLONASE) 50 MCG/ACT NASAL SPRAY    1 spray by Each Nostril route daily    GARLIC 947 MG CAPS    Take 1 capsule by mouth 2 times daily    GINGER, ZINGIBER OFFICINALIS, (GINGER ROOT) 500 MG CAPS    Take 1 capsule by mouth daily    LETROZOLE (FEMARA) 2.5 MG TABLET    Take 1 tablet by mouth daily    LORATADINE (CLARITIN) 10 MG CAPS    Take 10 mg by mouth as needed     MELATONIN 3 MG TABS TABLET    Take 5 mg by mouth nightly as needed    NONFORMULARY    Take 1 capsule by mouth daily as needed Orega Resp usually used in October    NONFORMULARY    Take 1 capsule by mouth 2 times daily as needed Respiration Blend SP-3 usually used in October    NONFORMULARY    Take 1 capsule by mouth 3 times daily [employer] by Source Natural    NONFORMULARY    Take 1 capsule by mouth 2 times daily (with meals) [facility] phosphatidyl serine + bacopa monnieri     NONFORMULARY    Take by mouth multidophilus    OLIVE LEAF EXTRACT 250 MG CAPS    Take 1 capsule by mouth 2 times daily    OMEGA-3 FATTY ACIDS (FISH OIL) 1200 MG CAPS    Take 3 capsules by mouth 4 times daily (before meals and nightly) [facility] pharmaceutical grade    POTASSIUM 99 MG TABS    Take 1 capsule by mouth every other day     PSEUDOEPHEDRINE (SUDAFED) 30 MG TABLET    Take 30 mg by mouth every 4 hours as needed for Congestion    TURMERIC CURCUMIN 500 MG CAPS    Take 1 capsule by mouth 2 times daily (with meals)     VITAMIN B-6 (PYRIDOXINE) 50 MG TABLET    Take 50 mg by mouth daily     VITAMIN D (CHOLECALCIFEROL) 5000 UNITS CAPS CAPSULE    Take 5,000 Units by mouth daily Skip Sunday    VITAMIN E 400 UNIT CAPSULE    Take 400 Units by mouth daily    ZOSTER RECOMBINANT ADJUVANTED VACCINE (SHINGRIX) 50 MCG/0.5ML SUSR INJECTION    Inject 0.5 mLs into the muscle See Admin Instructions 1 dose now and repeat in 2-6 months       ALLERGIES     Patient is is allergic to gluten meal, orange oil, and pcn [penicillins]. Patients   Immunization History   Administered Date(s) Administered    COVID-19, Moderna, Primary or Immunocompromised, PF, 100mcg/0.5mL 02/19/2021, 02/19/2021, 03/19/2021, 03/19/2021    COVID-19, Pfizer Purple top, DILUTE for use, 12+ yrs, 30mcg/0.3mL dose 11/20/2021    Influenza Whole 12/05/2008    Influenza, High Dose (Fluzone 65 yrs and older) 12/07/2016, 10/24/2017, 10/11/2018, 08/17/2020    Influenza, High-dose, Ora Gavin, 65 yrs +, IM (Fluzone) 08/17/2020    Influenza, MDCK Quadv, with preserv IM (Flucelvax 2 yrs and older) 10/17/2019, 10/17/2019    Pneumococcal Conjugate 13-valent (Wsvdyht51) 11/05/2018    Pneumococcal Polysaccharide (Eyxdfyfxb01) 11/03/2020    Tdap (Boostrix, Adacel) 10/12/2020       FAMILY HISTORY     Patient's family history includes Breast Cancer in her sister; Cancer in her mother; Diabetes in her mother; High Blood Pressure in her brother and mother; No Known Problems in her brother, brother, and father. SOCIAL HISTORY     Patient  reports that she has never smoked. She has never used smokeless tobacco. She reports that she does not drink alcohol and does not use drugs. PHYSICAL EXAM     ED TRIAGE VITALS  BP: 109/62, Temp: 97.3 °F (36.3 °C), Heart Rate: 96, Resp: 16, SpO2: 96 %,Estimated body mass index is 19.46 kg/m² as calculated from the following:    Height as of 5/18/22: 5' 4\" (1.626 m). Weight as of 5/23/22: 113 lb 6 oz (51.4 kg). ,No LMP recorded. Patient is postmenopausal.    Physical Exam  Constitutional:       General: She is not in acute distress. Appearance: She is normal weight.  She is not ill-appearing. HENT:      Head: Normocephalic. Right Ear: Tympanic membrane, ear canal and external ear normal.      Left Ear: Tympanic membrane, ear canal and external ear normal.      Nose: Rhinorrhea present. No congestion. Mouth/Throat:      Pharynx: Oropharynx is clear. No oropharyngeal exudate. Eyes:      Extraocular Movements:      Right eye: No nystagmus. Left eye: No nystagmus. Pupils: Pupils are equal, round, and reactive to light. Cardiovascular:      Rate and Rhythm: Normal rate and regular rhythm. Pulses: Normal pulses. Heart sounds: Normal heart sounds. Pulmonary:      Effort: Pulmonary effort is normal.      Breath sounds: Normal breath sounds. Abdominal:      General: Abdomen is flat. Bowel sounds are normal.      Palpations: Abdomen is soft. Skin:     General: Skin is warm and dry. Neurological:      General: No focal deficit present. Mental Status: She is alert. Psychiatric:         Mood and Affect: Mood normal.         Behavior: Behavior normal.         DIAGNOSTIC RESULTS     Labs:No results found for this visit on 06/09/22. IMAGING:    No orders to display         EKG:      URGENT CARE COURSE:     Vitals:    06/09/22 0923   BP: 109/62   Pulse: 96   Resp: 16   Temp: 97.3 °F (36.3 °C)   TempSrc: Infrared   SpO2: 96%       Medications - No data to display         PROCEDURES:  None    FINAL IMPRESSION      1. Seasonal allergies    2. Dizziness          DISPOSITION/ PLAN     Patient presents for symptoms of seasonal allergies. Patient was here recently for similar concerns. I see no signs of acute sinus or ear infection. Patient will continue current medications. Drink plenty of fluids. Meclizine as directed as needed for dizziness.   Return for new or worsening symptoms      PATIENT REFERRED TO:  Jani Olivares, APRN - CNP  [address] [place] [address] / [facility] [address]      DISCHARGE MEDICATIONS:  New Prescriptions    MECLIZINE (ANTIVERT) 25 MG TABLET    Take 1 tablet by mouth 3 times daily as needed for Dizziness       Discontinued Medications    AZITHROMYCIN (ZITHROMAX) 250 MG TABLET    Take 2 tabs (500 mg) on Day 1, and take 1 tab (250 mg) on days 2 through 5.        Current Discharge Medication List          Oneil Gottron, APRN - CNP    (Please note that portions of this note were completed with a voice recognition program. Efforts were made to edit the dictations but occasionally words are mis-transcribed.)          Oneil Gottron, APRN - CNP  06/09/22 1007

## 2022-06-09 NOTE — ED TRIAGE NOTES
Was here 2 weeks ago for the same thing, sinus congestion/drainage/cough/ears feel full/loosing vice, balance off

## 2022-06-10 ENCOUNTER — TELEPHONE (OUTPATIENT)
Dept: FAMILY MEDICINE CLINIC | Age: 77
End: 2022-06-10

## 2022-06-10 NOTE — LETTER
[address] [place] [address],[address] [facility]  [address] [facility],[address]  Phone: [phone]  Fax: [address] [facility]  [address] [address] [address], [address]  Phone: [phone]  Fax: [phone]    Cat 10, 2022    Javier Edge  [address] [place] [address]      Dear Lindsay Shaw,    This letter is regarding your Emergency Department (ED) visit at [address] [place] [address] on 6/9/22. Dr. Jesu Pineda wanted to make sure that you understand your discharge instructions and that you were able to fill any prescriptions that may have been ordered for you. Please contact the office at the above phone number if the ED advised you to follow up with Dr. Jesu Pineda, or if you have any further questions or needs. Also did you know -   *Visiting the ED for a non-emergency could result in higher co-pays than you would normally be subject to paying? *You can call your doctor even after hours so they can direct you to the most appropriate care. [facility]) practices can often offer you an appointment on the same day that you call. *We have some [facility] offices that offer Walk-in appointments; check our website for availability in your community, [email or web address].      *Evisits are now available for patients for $36 through [email or web address] for certain conditions:  * Sinus, cold and or cough       * Diarrhea            * Headache  * Heartburn                                * Poison Grisel          * Back pain     * Urinary problems                         If you do not have [employer] and are interested, please contact the office and a staff member may assist you or go to [email or web address].     Sincerely,     MATTHEW Hayes CNP and your [facility]

## 2022-08-03 ENCOUNTER — HOSPITAL ENCOUNTER (EMERGENCY)
Age: 77
Discharge: HOME | End: 2022-08-03
Payer: MEDICARE

## 2022-08-03 ENCOUNTER — APPOINTMENT (OUTPATIENT)
Dept: GENERAL RADIOLOGY | Age: 77
End: 2022-08-03
Payer: MEDICARE

## 2022-08-03 VITALS
HEIGHT: 64 IN | TEMPERATURE: 97.2 F | BODY MASS INDEX: 19.29 KG/M2 | DIASTOLIC BLOOD PRESSURE: 68 MMHG | RESPIRATION RATE: 16 BRPM | HEART RATE: 92 BPM | WEIGHT: 113 LBS | SYSTOLIC BLOOD PRESSURE: 140 MMHG | OXYGEN SATURATION: 96 %

## 2022-08-03 DIAGNOSIS — S66.902A INJURY OF LEFT SCAPHOLUNATE LIGAMENT WITH NO INSTABILITY, INITIAL ENCOUNTER: ICD-10-CM

## 2022-08-03 DIAGNOSIS — S63.502A LEFT WRIST SPRAIN, INITIAL ENCOUNTER: Primary | ICD-10-CM

## 2022-08-03 PROCEDURE — 99213 OFFICE O/P EST LOW 20 MIN: CPT

## 2022-08-03 PROCEDURE — 73110 X-RAY EXAM OF WRIST: CPT

## 2022-08-03 PROCEDURE — 99213 OFFICE O/P EST LOW 20 MIN: CPT | Performed by: EMERGENCY MEDICINE

## 2022-08-03 ASSESSMENT — ENCOUNTER SYMPTOMS
NAUSEA: 0
SHORTNESS OF BREATH: 0
COUGH: 0
COLOR CHANGE: 0
VOMITING: 0

## 2022-08-03 ASSESSMENT — PAIN SCALES - GENERAL: PAINLEVEL_OUTOF10: 2

## 2022-08-03 ASSESSMENT — PAIN DESCRIPTION - LOCATION: LOCATION: WRIST

## 2022-08-03 ASSESSMENT — PAIN DESCRIPTION - PAIN TYPE: TYPE: ACUTE PAIN

## 2022-08-03 ASSESSMENT — PAIN - FUNCTIONAL ASSESSMENT: PAIN_FUNCTIONAL_ASSESSMENT: 0-10

## 2022-08-03 ASSESSMENT — PAIN DESCRIPTION - ORIENTATION: ORIENTATION: RIGHT

## 2022-08-03 NOTE — ED NOTES
Pt ambulatory to room 4 with complaints of falling this am.  States she was outside watering her plants when she tripped on some uneven cement and caught herself with her hands. States more weight was put on the left wrist now painful. Worse when she holds her hand down. Pulses present with good cap refill. Pt state she was able to put a splint on and go to work.      Colton Garrett RN  08/03/22 5794

## 2022-08-03 NOTE — ED PROVIDER NOTES
[facility] 36  Urgent Care Encounter       CHIEF COMPLAINT       Chief Complaint   Patient presents with    Fall    Wrist Injury     left       Nurses Notes reviewed and I agree except as noted in the HPI. HISTORY OF PRESENT ILLNESS   Jairon Collazo is a 69 y.o. female who presents for complaints of left wrist pain. Patient states she fell this morning. She states she tripped over an uneven sidewalk. She fell onto outstretched hands. She states she developed pain to the distal left wrist.  Patient denies any other injuries. She states she lightly struck her head on the sidewalk but caught herself with her hands. She did not lose consciousness. She denies any headache, nausea or vomiting, no neck pain or tenderness. She states she did not hurt her knees or lower extremities. She is here for evaluation of the left wrist pain. Patient does have a Velcro wrist splint that she is currently wearing and states feels better in the wrist splint. HPI    REVIEW OF SYSTEMS     Review of Systems   Constitutional:  Negative for activity change, fatigue and fever. Respiratory:  Negative for cough and shortness of breath. Gastrointestinal:  Negative for nausea and vomiting. Musculoskeletal:  Positive for arthralgias (left wrist). Negative for neck pain and neck stiffness. Skin:  Negative for color change. Neurological:  Negative for dizziness and headaches. PAST MEDICAL HISTORY         Diagnosis Date    Cancer [facility])        SURGICALHISTORY     Patient  has a past surgical history that includes Breast surgery and Finger trigger release (Right, 2017).     CURRENT MEDICATIONS       Discharge Medication List as of 8/3/2022  5:05 PM        CONTINUE these medications which have NOT CHANGED    Details   pseudoephedrine (SUDAFED) 30 MG tablet Take 30 mg by mouth every 4 hours as needed for CongestionHistorical Med      fluticasone (FLONASE) 50 MCG/ACT nasal spray 1 spray by Each Nostril route daily, Disp-16 g, R-0Normal      !! NONFORMULARY Take by mouth multidophilusHistorical Med      zoster recombinant adjuvanted vaccine (SHINGRIX) 50 MCG/0.5ML SUSR injection Inject 0.5 mLs into the muscle See Admin Instructions 1 dose now and repeat in 2-6 months, Disp-0.5 mL, R-0Print      !! NONFORMULARY Take 1 capsule by mouth 2 times daily (with meals) P[facility] [employer] phosphatidyl serine + bacopa monnieri Historical Med      Coenzyme Q10-Red Yeast Rice  MG CAPS Take 1 capsule by mouth 2 times daily (before meals) Supper and bedtimeHistorical Med      letrozole (FEMARA) 2.5 MG tablet Take 1 tablet by mouth daily, R-3Historical Med      !! NONFORMULARY Take 1 capsule by mouth 3 times daily Magtein by Source NaturalHistorical Med      B Complex-Folic Acid (Q-255 BALANCED TR PO) Take 1 tablet by mouth 3 times daily (before meals) [address] [facility] at [address] [place]      Potassium 99 MG TABS Take 1 capsule by mouth every other day Historical Med      diphenhydrAMINE-PE-APAP (ALLERGY RELIEF PLUS SINUS) 25-5-325 MG TABS Take by mouth as neededHistorical Med      vitamin D (CHOLECALCIFEROL) 5000 units CAPS capsule Take 5,000 Units by mouth daily Skip SundayHistorical Med      Cinnamon 500 MG CAPS Take 500 mg by mouth 3 times daily Historical Med      Yadira, Zingiber officinalis, (YADIRA ROOT) 500 MG CAPS Take 1 capsule by mouth dailyHistorical Med      Turmeric Curcumin 500 MG CAPS Take 1 capsule by mouth 2 times daily (with meals) Historical Med      melatonin 3 MG TABS tablet Take 5 mg by mouth nightly as neededHistorical Med      Garlic 399 MG CAPS Take 1 capsule by mouth 2 times dailyHistorical Med      Calcium Carbonate-Vit D-Min (CALCIUM 1200 PO) Take 1 tablet by mouth daily (before lunch) Historical Med      Omega-3 Fatty Acids (FISH OIL) 1200 MG CAPS Take 3 capsules by mouth 4 times daily (before meals and nightly) [facility] pharmaceutical gradeHistorical Med      vitamin E 400 UNIT capsule Take 400 Units by mouth dailyHistorical Med      vitamin B-6 (PYRIDOXINE) 50 MG tablet Take 50 mg by mouth daily Historical Med      Cyanocobalamin (VITAMIN B-12) 3000 MCG SUBL Place 2 tablets under the tongue daily Historical Med      Biotin 2500 MCG CAPS Take 1 tablet by mouth every other day Historical Med      !! NONFORMULARY Take 1 capsule by mouth daily as needed Orega Resp usually used in OctoberTrinity Healthical Med      !! NONFORMULARY Take 1 capsule by mouth 2 times daily as needed Respiration Blend SP-3 usually used in OctoberHistorical Med      Olive Leaf Extract 250 MG CAPS Take 1 capsule by mouth 2 times dailyHistorical Med      Cetirizine HCl (ZYRTEC ALLERGY) 10 MG CAPS Take by mouth nightly Historical Med      Loratadine (CLARITIN) 10 MG CAPS Take 10 mg by mouth as needed Historical Med       !! - Potential duplicate medications found. Please discuss with provider. ALLERGIES     Patient is is allergic to gluten meal, orange oil, and pcn [penicillins].     Patients   Immunization History   Administered Date(s) Administered    COVID-19, MODERNA BLUE border, Primary or Immunocompromised, (age 12y+), IM, 100 mcg/0.5mL 02/19/2021, 02/19/2021, 03/19/2021, 03/19/2021    COVID-19, PFIZER PURPLE top, DILUTE for use, (age 15 y+), 30mcg/0.3mL 11/20/2021    Influenza Whole 12/05/2008    Influenza, High Dose (Fluzone 65 yrs and older) 12/07/2016, 10/24/2017, 10/11/2018, 08/17/2020    Influenza, High-dose, Quadv, 65 yrs +, IM (Fluzone) 08/17/2020    Influenza, MDCK Quadv, with preserv IM (Flucelvax 2 yrs and older) 10/17/2019, 10/17/2019    Pneumococcal Conjugate 13-valent ([id number]) 11/05/2018    Pneumococcal Polysaccharide ([id number]) 11/03/2020    Tdap (Boostrix, Adacel) 10/12/2020       FAMILY HISTORY     Patient's family history includes Breast Cancer in her sister; Cancer in her mother; Diabetes in her mother; High Blood Pressure in her brother and mother; No Known Problems in her brother, brother, and father. SOCIAL HISTORY     Patient  reports that she has never smoked. She has never used smokeless tobacco. She reports that she does not drink alcohol and does not use drugs. PHYSICAL EXAM     ED TRIAGE VITALS  BP: (!) 140/68, Temp: 97.2 °F (36.2 °C), Heart Rate: 92, Resp: 16, SpO2: 96 %,Estimated body mass index is 19.4 kg/m² as calculated from the following:    Height as of this encounter: 5' 4\" (1.626 m). Weight as of this encounter: 113 lb (51.3 kg). ,No LMP recorded. Patient is postmenopausal.    Physical Exam  Constitutional:       General: She is not in acute distress. Appearance: She is normal weight. She is not ill-appearing. HENT:      Head: Normocephalic and atraumatic. Nose: Nose normal.      Mouth/Throat:      Mouth: Mucous membranes are moist.   Cardiovascular:      Rate and Rhythm: Normal rate and regular rhythm. Pulses: Normal pulses. Heart sounds: Normal heart sounds. Pulmonary:      Effort: Pulmonary effort is normal.      Breath sounds: Normal breath sounds. Musculoskeletal:      Right elbow: Normal.      Left elbow: Normal.      Right forearm: Normal.      Left forearm: Normal.      Right wrist: Normal.      Left wrist: Tenderness and bony tenderness present. No snuff box tenderness or crepitus. Right hand: Normal.      Left hand: Normal.      Right hip: Normal.      Left hip: Normal.      Right knee: Normal.      Left knee: Normal.      Comments: Tenderness to the distal wrist in the carpal region, more on the radial aspect. No significant swelling or bruising   Skin:     General: Skin is warm and dry. Neurological:      General: No focal deficit present. Mental Status: She is alert. Psychiatric:         Mood and Affect: Mood normal.         Behavior: Behavior normal.       DIAGNOSTIC RESULTS     Labs:No results found for this visit on 08/03/22. IMAGING:    XR WRIST LEFT (MIN 3 VIEWS)   Final Result   No acute abnormality.             **This report has been created using voice recognition software. It may contain minor errors which are inherent in voice recognition technology. **      Final report electronically signed by Dr. Tyrone Robbins on 8/3/2022 4:48 PM            EKG:      URGENT CARE COURSE:     Vitals:    08/03/22 1636   BP: (!) 140/68   Pulse: 92   Resp: 16   Temp: 97.2 °F (36.2 °C)   SpO2: 96%   Weight: 113 lb (51.3 kg)   Height: 5' 4\" (1.626 m)       Medications - No data to display         PROCEDURES:  None    FINAL IMPRESSION      1. Left wrist sprain, initial encounter    2. Injury of left scapholunate ligament with no instability, initial encounter          DISPOSITION/ PLAN   Patient presents for what is likely a left wrist sprain. X-rays were performed and negative for fracture dislocation. Radiologist did indicate there is possible scapholunate widening. There is no instability to the wrist.  I advised the patient she should have this further evaluated by orthopedics. I offered a new Velcro wrist splint but patient preferred to continue wearing her old splint. She is advised to RICE. Tylenol/ibuprofen for pain. Ice to the area frequently.   Follow-up with orthopedic institute tomorrow for further evaluation        PATIENT REFERRED TO:  MATTHEW Wilson CNP  [address] [place], [place] [id number]      DISCHARGE MEDICATIONS:  Discharge Medication List as of 8/3/2022  5:05 PM          Discharge Medication List as of 8/3/2022  5:05 PM          Discharge Medication List as of 8/3/2022  5:05 PM          MATTHEW Milton CNP    (Please note that portions of this note were completed with a voice recognition program. Efforts were made to edit the dictations but occasionally words are mis-transcribed.)           MATTHEW Milton CNP  08/03/22 9332

## 2022-08-04 ENCOUNTER — TELEPHONE (OUTPATIENT)
Dept: FAMILY MEDICINE CLINIC | Age: 77
End: 2022-08-04

## 2022-08-04 NOTE — LETTER
[facility]  [address], [address]  Phone: [phone]  Fax: [phone]    August 4, 2022    Nelda Hurst  [address] [place] [address]      Dear Flori Perla,    This letter is regarding your Emergency Department (ED) visit at [address] [address] on 8/3/22. Dr. Ba Fuchs wanted to make sure that you understand your discharge instructions and that you were able to fill any prescriptions that may have been ordered for you. Please contact the office at the above phone number if the ED advised you to follow up with Dr. Ba Fuchs, or if you have any further questions or needs. Also did you know -   *Visiting the ED for a non-emergency could result in higher co-pays than you would normally be subject to paying? *You can call your doctor even after hours so they can direct you to the most appropriate care. [facility]) practices can often offer you an appointment on the same day that you call. *We have some [address] [facility] offices that offer Walk-in appointments; check our website for availability in your community, [email or web address].      *Evisits are now available for patients for $36 through [employer] for certain conditions:  * Sinus, cold and or cough       * Diarrhea            * Headache  * Heartburn                                * Poison Grisel          * Back pain     * Urinary problems                         If you do not have [facility] and are interested, please contact the office and a staff member may assist you or go to [email or web address].     Sincerely,     MATTHEW Gates CNP and your [facility]

## 2022-09-09 ENCOUNTER — NURSE ONLY (OUTPATIENT)
Dept: LAB | Age: 77
End: 2022-09-09

## 2022-09-09 DIAGNOSIS — E78.2 MIXED HYPERLIPIDEMIA: ICD-10-CM

## 2022-09-09 DIAGNOSIS — R73.09 LOW GLUCOSE LEVEL: ICD-10-CM

## 2022-09-09 DIAGNOSIS — B96.20 E. COLI UTI (URINARY TRACT INFECTION): ICD-10-CM

## 2022-09-09 DIAGNOSIS — M19.019 ARTHRITIS PAIN OF SHOULDER: ICD-10-CM

## 2022-09-09 DIAGNOSIS — R35.0 URINARY FREQUENCY: ICD-10-CM

## 2022-09-09 DIAGNOSIS — C50.011 MALIGNANT NEOPLASM OF NIPPLE OF RIGHT BREAST IN FEMALE, UNSPECIFIED ESTROGEN RECEPTOR STATUS (HCC): ICD-10-CM

## 2022-09-09 DIAGNOSIS — E55.9 VITAMIN D DEFICIENCY: ICD-10-CM

## 2022-09-09 DIAGNOSIS — R53.83 OTHER FATIGUE: ICD-10-CM

## 2022-09-09 DIAGNOSIS — Z13.29 THYROID DISORDER SCREEN: ICD-10-CM

## 2022-09-09 DIAGNOSIS — H04.123 DRY EYES: ICD-10-CM

## 2022-09-09 DIAGNOSIS — N39.0 E. COLI UTI (URINARY TRACT INFECTION): ICD-10-CM

## 2022-09-09 DIAGNOSIS — Z71.89 ENCOUNTER FOR HERB AND VITAMIN SUPPLEMENT MANAGEMENT: ICD-10-CM

## 2022-09-09 DIAGNOSIS — E78.41 ELEVATED LIPOPROTEIN(A): ICD-10-CM

## 2022-09-09 DIAGNOSIS — K90.89 OTHER INTESTINAL MALABSORPTION: ICD-10-CM

## 2022-09-09 LAB
AVERAGE GLUCOSE: 105 MG/DL (ref 70–126)
BACTERIA: ABNORMAL /HPF
BASOPHILS # BLD: 0.6 %
BASOPHILS ABSOLUTE: 0 THOU/MM3 (ref 0–0.1)
BILIRUBIN URINE: NEGATIVE
BLOOD, URINE: ABNORMAL
CALCIUM SERPL-MCNC: 9.5 MG/DL (ref 8.5–10.5)
CASTS 2: ABNORMAL /LPF
CASTS UA: ABNORMAL /LPF
CHARACTER, URINE: ABNORMAL
CHOLESTEROL, TOTAL: 266 MG/DL (ref 100–199)
COLOR: ABNORMAL
CRYSTALS, UA: ABNORMAL
EOSINOPHIL # BLD: 3.8 %
EOSINOPHILS ABSOLUTE: 0.2 THOU/MM3 (ref 0–0.4)
EPITHELIAL CELLS, UA: ABNORMAL /HPF
ERYTHROCYTE [DISTWIDTH] IN BLOOD BY AUTOMATED COUNT: 14.3 % (ref 11.5–14.5)
ERYTHROCYTE [DISTWIDTH] IN BLOOD BY AUTOMATED COUNT: 47.9 FL (ref 35–45)
ESTRADIOL LEVEL: < 5 PG/ML (ref 5–50)
GLUCOSE URINE: NEGATIVE MG/DL
HBA1C MFR BLD: 5.5 % (ref 4.4–6.4)
HCT VFR BLD CALC: 40.8 % (ref 37–47)
HDLC SERPL-MCNC: 99 MG/DL
HEMOGLOBIN: 13.6 GM/DL (ref 12–16)
IMMATURE GRANS (ABS): 0 THOU/MM3 (ref 0–0.07)
IMMATURE GRANULOCYTES: 0 %
KETONES, URINE: NEGATIVE
LDL CHOLESTEROL CALCULATED: 151 MG/DL
LEUKOCYTE ESTERASE, URINE: ABNORMAL
LYMPHOCYTES # BLD: 39.4 %
LYMPHOCYTES ABSOLUTE: 2 THOU/MM3 (ref 1–4.8)
MAGNESIUM: 2.2 MG/DL (ref 1.6–2.4)
MCH RBC QN AUTO: 31.2 PG (ref 26–33)
MCHC RBC AUTO-ENTMCNC: 33.3 GM/DL (ref 32.2–35.5)
MCV RBC AUTO: 93.6 FL (ref 81–99)
MISCELLANEOUS 2: ABNORMAL
MONOCYTES # BLD: 8.2 %
MONOCYTES ABSOLUTE: 0.4 THOU/MM3 (ref 0.4–1.3)
NITRITE, URINE: NEGATIVE
NUCLEATED RED BLOOD CELLS: 0 /100 WBC
PH UA: 8.5 (ref 5–9)
PLATELET # BLD: 265 THOU/MM3 (ref 130–400)
PMV BLD AUTO: 10 FL (ref 9.4–12.4)
PROTEIN UA: NEGATIVE
PTH INTACT: 18.4 PG/ML (ref 15–65)
RBC # BLD: 4.36 MILL/MM3 (ref 4.2–5.4)
RBC URINE: ABNORMAL /HPF
RENAL EPITHELIAL, UA: ABNORMAL
RHEUMATOID FACTOR: < 10 IU/ML (ref 0–13)
SEDIMENTATION RATE, ERYTHROCYTE: 9 MM/HR (ref 0–20)
SEG NEUTROPHILS: 48 %
SEGMENTED NEUTROPHILS ABSOLUTE COUNT: 2.4 THOU/MM3 (ref 1.8–7.7)
SPECIFIC GRAVITY, URINE: 1.01 (ref 1–1.03)
TRIGL SERPL-MCNC: 78 MG/DL (ref 0–199)
UROBILINOGEN, URINE: 0.2 EU/DL (ref 0–1)
VITAMIN D 25-HYDROXY: 96 NG/ML (ref 30–100)
WBC # BLD: 5 THOU/MM3 (ref 4.8–10.8)
WBC UA: ABNORMAL /HPF
YEAST: ABNORMAL

## 2022-09-10 LAB
C-REACTIVE PROTEIN, HIGH SENSITIVITY: 1.1 MG/L
THYROID PEROXIDASE ANTIBODY: < 4 IU/ML (ref 0–25)
TISSUE TRANSGLUTAMINASE IGA: < 0.1 U/ML
TTG, IGG: < 0.6 U/ML

## 2022-09-12 LAB
ANA SCREEN: NORMAL
DHEAS (DHEA SULFATE): 386 UG/DL (ref 10–90)

## 2022-09-15 ENCOUNTER — OFFICE VISIT (OUTPATIENT)
Dept: FAMILY MEDICINE CLINIC | Age: 77
End: 2022-09-15
Payer: MEDICARE

## 2022-09-15 VITALS
WEIGHT: 118 LBS | DIASTOLIC BLOOD PRESSURE: 72 MMHG | RESPIRATION RATE: 12 BRPM | HEART RATE: 84 BPM | BODY MASS INDEX: 20.14 KG/M2 | TEMPERATURE: 97.8 F | SYSTOLIC BLOOD PRESSURE: 124 MMHG | HEIGHT: 64 IN

## 2022-09-15 DIAGNOSIS — Z23 NEED FOR INFLUENZA VACCINATION: Primary | ICD-10-CM

## 2022-09-15 DIAGNOSIS — Z00.00 MEDICARE ANNUAL WELLNESS VISIT, SUBSEQUENT: ICD-10-CM

## 2022-09-15 DIAGNOSIS — Z78.0 POSTMENOPAUSAL: ICD-10-CM

## 2022-09-15 DIAGNOSIS — Z23 NEED FOR SHINGLES VACCINE: ICD-10-CM

## 2022-09-15 PROCEDURE — 90694 VACC AIIV4 NO PRSRV 0.5ML IM: CPT | Performed by: NURSE PRACTITIONER

## 2022-09-15 PROCEDURE — G0439 PPPS, SUBSEQ VISIT: HCPCS | Performed by: NURSE PRACTITIONER

## 2022-09-15 PROCEDURE — 1123F ACP DISCUSS/DSCN MKR DOCD: CPT | Performed by: NURSE PRACTITIONER

## 2022-09-15 PROCEDURE — G0008 ADMIN INFLUENZA VIRUS VAC: HCPCS | Performed by: NURSE PRACTITIONER

## 2022-09-15 RX ORDER — ZOSTER VACCINE RECOMBINANT, ADJUVANTED 50 MCG/0.5
0.5 KIT INTRAMUSCULAR SEE ADMIN INSTRUCTIONS
Qty: 0.5 ML | Refills: 0 | Status: SHIPPED | OUTPATIENT
Start: 2022-09-15 | End: 2023-03-14

## 2022-09-15 SDOH — ECONOMIC STABILITY: FOOD INSECURITY: WITHIN THE PAST 12 MONTHS, YOU WORRIED THAT YOUR FOOD WOULD RUN OUT BEFORE YOU GOT MONEY TO BUY MORE.: NEVER TRUE

## 2022-09-15 SDOH — ECONOMIC STABILITY: FOOD INSECURITY: WITHIN THE PAST 12 MONTHS, THE FOOD YOU BOUGHT JUST DIDN'T LAST AND YOU DIDN'T HAVE MONEY TO GET MORE.: NEVER TRUE

## 2022-09-15 ASSESSMENT — PATIENT HEALTH QUESTIONNAIRE - PHQ9
SUM OF ALL RESPONSES TO PHQ QUESTIONS 1-9: 0
SUM OF ALL RESPONSES TO PHQ QUESTIONS 1-9: 0
2. FEELING DOWN, DEPRESSED OR HOPELESS: 0
1. LITTLE INTEREST OR PLEASURE IN DOING THINGS: 0
SUM OF ALL RESPONSES TO PHQ9 QUESTIONS 1 & 2: 0
SUM OF ALL RESPONSES TO PHQ QUESTIONS 1-9: 0
SUM OF ALL RESPONSES TO PHQ QUESTIONS 1-9: 0

## 2022-09-15 ASSESSMENT — LIFESTYLE VARIABLES
HOW MANY STANDARD DRINKS CONTAINING ALCOHOL DO YOU HAVE ON A TYPICAL DAY: PATIENT DOES NOT DRINK
HOW OFTEN DO YOU HAVE A DRINK CONTAINING ALCOHOL: NEVER

## 2022-09-15 ASSESSMENT — SOCIAL DETERMINANTS OF HEALTH (SDOH): HOW HARD IS IT FOR YOU TO PAY FOR THE VERY BASICS LIKE FOOD, HOUSING, MEDICAL CARE, AND HEATING?: NOT HARD AT ALL

## 2022-09-15 NOTE — PROGRESS NOTES
Vaccine Information Sheet, \"Influenza - Inactivated\"  given to [employer], and verbalized understanding. Patient responses:    Have you ever had a reaction to a flu vaccine? No  Do you have an allergy to eggs, Latex -induced anaphylaxis, neomycin or polymixin? No  Do you have an allergy to Thimerosal, contact lens solution, or Merthiolate? No  Have you ever had [place] Syndrome? No  Do you have any current illness? No  Do you have a temperature above 100.4 degrees? No  Are you pregnant? No  If pregnant, permission obtained from physician? N/A  Do you have an active neurological disorder? No    Immunizations Administered       Name Date Dose Route    Influenza, FLUAD, (age 72 y+), Adjuvanted, 0.5mL 9/15/2022 0.5 mL Intramuscular    Site: Deltoid- Right    Lot: [id number]    NDC: 98180-589-18        After obtaining consent, and per orders of Hima Osorio CNP, the injection was given by Raymon Ojeda LPN. Patient tolerated well.

## 2022-09-15 NOTE — PROGRESS NOTES
Medicare Annual Wellness Visit    Luba Zabala is here for Medicare AWV and 6 Month Follow-Up (Labs done, results in epic. )    Wellness visit:  The patient has no complaints today. Patient eats 3 meals per day and 1 snacks per day. She does exercise regularly:   Physical activities include: weight training, yoga, 4 times per week, 20 minutes/day. She does take over the counter vitamins or supplements. The patient has ever had a blood transfusion or tattooed?: no. She wears seatbelts while riding a car. She does not text or talk on the phone while driving. She performs all of her ADL's without problem. She is independent, she cooks, drives, bathes, and gets dressed without assistance. She is . She has 2 children. She does work. She works 30+ hours a week at [employer]. She is not current on Influenza and Covid 4th dose, shingles . She is not a smoker. Patient does not consume alcoholic beverages on a regular basis. Patient has had a colonoscopy. She  reports that she has never smoked. She has never used smokeless tobacco. She reports that she does not drink alcohol and does not use drugs. .  She has had a colonoscopy. It was normal.  Her last mammogram was 1 years, it wasnormal.  Had a dexa done 2019 - osteopenia     Assessment & Plan   Need for influenza vaccination  -     Influenza, FLUAD, (age 72 y+), IM, Preservative Free, 0.5 mL  Medicare annual wellness visit, subsequent  Need for shingles vaccine  -     zoster recombinant adjuvanted vaccine (SHINGRIX) 50 MCG/0.5ML SUSR injection;  Inject 0.5 mLs into the muscle See Admin Instructions 1 dose now and repeat in 2-6 months, Disp-0.5 mL, R-0Print  Postmenopausal  -     DEXA BONE DENSITY AXIAL SKELETON; Future    Recommendations for Preventive Services Due: see orders and patient instructions/AVS.  Recommended screening schedule for the next 5-10 years is provided to the patient in written form: see Patient Instructions/AVS.     Return in 6 months (on 3/15/2023) for Medicare Annual Wellness Visit in 1 year. Patient's complete Health Risk Assessment and screening values have been reviewed and are found in Flowsheets. The following problems were reviewed today and where indicated follow up appointments were made and/or referrals ordered. Positive Risk Factor Screenings with Interventions:    Fall Risk:  Do you feel unsteady or are you worried about falling? : no  2 or more falls in past year?: (!) yes  Fall with injury in past year?: no   Fall Risk Interventions:    Home safety tips provided  Home exercises provided to promote strength and balance             Health Habits/Nutrition:  Physical Activity: Insufficiently Active    Days of Exercise per Week: 6 days    Minutes of Exercise per Session: 20 min     Have you lost any weight without trying in the past 3 months?: (!) Yes  Body mass index: 20.25  Have you seen the dentist within the past year?: Yes  Health Habits/Nutrition Interventions:  Nutritional issues:  educational materials for healthy, well-balanced diet provided    Hearing/Vision:  Do you or your family notice any trouble with your hearing that hasn't been managed with hearing aids?: No  Do you have difficulty driving, watching TV, or doing any of your daily activities because of your eyesight?: No  Have you had an eye exam within the past year?: (!) No  No results found.   Hearing/Vision Interventions:  Vision concerns:  patient encouraged to make appointment with his/her eye specialist    Safety:  Do you have working smoke detectors?: Yes  Do you have any tripping hazards - loose or unsecured carpets or rugs?: No  Do you have any tripping hazards - clutter in doorways, halls, or stairs?: No  Do you have either shower bars, grab bars, non-slip mats or non-slip surfaces in your shower or bathtub?: (!) No  Do all of your stairways have a railing or banister?: (!) No  Do you always fasten your seatbelt when you are in a car?: Yes  Safety MD   vitamin D (CHOLECALCIFEROL) 5000 units CAPS capsule Take 5,000 Units by mouth daily Skip Sunday Yes Historical Provider, MD   Cinnamon 500 MG CAPS Take 500 mg by mouth 3 times daily  Yes Historical Provider, MD   Ginggrisel, Zingiber officinalis, (GINGER ROOT) 500 MG CAPS Take 1 capsule by mouth daily Yes Historical Provider, MD   Turmeric Curcumin 500 MG CAPS Take 3 capsules by mouth in the morning, at noon, and at bedtime Yes Historical Provider, MD   melatonin 3 MG TABS tablet Take 5 mg by mouth nightly as needed Yes Historical Provider, MD   Garlic 942 MG CAPS Take 1 capsule by mouth 2 times daily Yes Historical Provider, MD   Calcium Carbonate-Vit D-Min (CALCIUM 1200 PO) Take 1 tablet by mouth daily (before lunch)  Yes Historical Provider, MD   Omega-3 Fatty Acids (FISH OIL) 1200 MG CAPS Take 3 capsules by mouth 4 times daily (before meals and nightly) [facility] pharmaceutical grade Yes Historical Provider, MD   vitamin E 400 UNIT capsule Take 400 Units by mouth daily Yes Historical Provider, MD   vitamin B-6 (PYRIDOXINE) 50 MG tablet Take 50 mg by mouth daily  Yes Historical Provider, MD   Cyanocobalamin (VITAMIN B-12) 3000 MCG SUBL Place 2 tablets under the tongue daily  Yes Historical Provider, MD   Biotin 2500 MCG CAPS Take 1 tablet by mouth every other day  Yes Historical Provider, MD   NONFORMULARY Take 1 capsule by mouth daily as needed Orega Resp usually used in October Yes Historical Provider, MD   NONFORMULARY Take 1 capsule by mouth 2 times daily as needed Respiration Blend SP-3 usually used in October Yes Historical Provider, MD   Olive Leaf Extract 250 MG CAPS Take 1 capsule by mouth 2 times daily Yes Historical Provider, MD   Cetirizine HCl 10 MG CAPS Take by mouth nightly  Yes Historical Provider, MD   loratadine (CLARITIN) 10 MG capsule Take 10 mg by mouth as needed  Yes Historical Provider, [place] (Including outside providers/suppliers regularly involved in providing care):   Patient Care Team:  MATTHEW Tran CNP as PCP - General (Certified Nurse Practitioner)  MATTHEW Tran CNP as PCP - [facility] Empaneled Provider     Reviewed and updated this visit:  Tobacco  Allergies  Meds  Problems  Med Hx  Surg Hx  Soc Hx  Fam Hx

## 2022-09-19 ENCOUNTER — OFFICE VISIT (OUTPATIENT)
Dept: FAMILY MEDICINE CLINIC | Age: 77
End: 2022-09-19
Payer: MEDICARE

## 2022-09-19 VITALS
HEIGHT: 64 IN | WEIGHT: 119.8 LBS | OXYGEN SATURATION: 97 % | SYSTOLIC BLOOD PRESSURE: 114 MMHG | DIASTOLIC BLOOD PRESSURE: 60 MMHG | BODY MASS INDEX: 20.45 KG/M2 | TEMPERATURE: 97.2 F | HEART RATE: 74 BPM | RESPIRATION RATE: 10 BRPM

## 2022-09-19 DIAGNOSIS — E78.41 ELEVATED LIPOPROTEIN(A): ICD-10-CM

## 2022-09-19 DIAGNOSIS — C50.011 MALIGNANT NEOPLASM OF NIPPLE OF RIGHT BREAST IN FEMALE, UNSPECIFIED ESTROGEN RECEPTOR STATUS (HCC): ICD-10-CM

## 2022-09-19 DIAGNOSIS — E78.2 MIXED HYPERLIPIDEMIA: ICD-10-CM

## 2022-09-19 DIAGNOSIS — R73.09 LOW GLUCOSE LEVEL: ICD-10-CM

## 2022-09-19 DIAGNOSIS — E55.9 VITAMIN D DEFICIENCY: Primary | ICD-10-CM

## 2022-09-19 DIAGNOSIS — K90.89 OTHER INTESTINAL MALABSORPTION: ICD-10-CM

## 2022-09-19 DIAGNOSIS — R23.2 HOT FLASHES: ICD-10-CM

## 2022-09-19 DIAGNOSIS — Z13.29 THYROID DISORDER SCREEN: ICD-10-CM

## 2022-09-19 DIAGNOSIS — R53.83 OTHER FATIGUE: ICD-10-CM

## 2022-09-19 DIAGNOSIS — Z71.89 ENCOUNTER FOR HERB AND VITAMIN SUPPLEMENT MANAGEMENT: ICD-10-CM

## 2022-09-19 DIAGNOSIS — R35.0 URINARY FREQUENCY: ICD-10-CM

## 2022-09-19 LAB
DHEA UNCONJUGATED: 3.08 NG/ML (ref 0.63–4.7)
TESTOSTERONE, FREE W SHGB, FEMALES/CHILDREN: NORMAL

## 2022-09-19 PROCEDURE — G8427 DOCREV CUR MEDS BY ELIG CLIN: HCPCS | Performed by: FAMILY MEDICINE

## 2022-09-19 PROCEDURE — 1123F ACP DISCUSS/DSCN MKR DOCD: CPT | Performed by: FAMILY MEDICINE

## 2022-09-19 PROCEDURE — 99213 OFFICE O/P EST LOW 20 MIN: CPT | Performed by: FAMILY MEDICINE

## 2022-09-19 PROCEDURE — G8420 CALC BMI NORM PARAMETERS: HCPCS | Performed by: FAMILY MEDICINE

## 2022-09-19 PROCEDURE — G8400 PT W/DXA NO RESULTS DOC: HCPCS | Performed by: FAMILY MEDICINE

## 2022-09-19 PROCEDURE — 1090F PRES/ABSN URINE INCON ASSESS: CPT | Performed by: FAMILY MEDICINE

## 2022-09-19 PROCEDURE — 1036F TOBACCO NON-USER: CPT | Performed by: FAMILY MEDICINE

## 2022-09-19 RX ORDER — AMPICILLIN TRIHYDRATE 250 MG
1 CAPSULE ORAL
COMMUNITY

## 2022-09-19 NOTE — PROGRESS NOTES
zoster recombinant adjuvanted vaccine [facility]) 50 MCG/0.5ML SUSR injection, Inject 0.5 mLs into the muscle See Admin Instructions 1 dose now and repeat in 2-6 months, Disp: 0.5 mL, Rfl: 0    pseudoephedrine (SUDAFED) 30 MG tablet, Take 30 mg by mouth every 4 hours as needed for Congestion, Disp: , Rfl:     fluticasone (FLONASE) 50 MCG/ACT nasal spray, 1 spray by Each Nostril route daily, Disp: 16 g, Rfl: 0    NONFORMULARY, Take by mouth multidophilus, Disp: , Rfl:     NONFORMULARY, Take 1 capsule by mouth 2 times daily (with meals) BREAKFAST AND LUNCH FilterSure at [facility] serine + bacopa monnieri, Disp: , Rfl:     Coenzyme Q10-Red Yeast Rice  MG CAPS, Take 1 capsule by mouth 2 times daily (before meals) Supper and bedtime, Disp: , Rfl:     letrozole (FEMARA) 2.5 MG tablet, Take 1 tablet by mouth daily, Disp: , Rfl: 3    NONFORMULARY, Take 1 capsule by mouth 3 times daily [facility] by Source Natural, Disp: , Rfl:     B Complex-Folic Acid (V-478 BALANCED TR PO), Take 1 tablet by mouth 3 times daily (before meals) [id number] [facility] at [employer] , Disp: , Rfl:     Potassium 99 MG TABS, Take 1 capsule by mouth every other day , Disp: , Rfl:     diphenhydrAMINE-PE-APAP 25-5-325 MG TABS, Take by mouth as needed, Disp: , Rfl:     vitamin D (CHOLECALCIFEROL) 5000 units CAPS capsule, Take 5,000 Units by mouth once a week STOP TIL MARCH THEN WEEKLY, Disp: , Rfl:     Cinnamon 500 MG CAPS, Take 500 mg by mouth 3 times daily , Disp: , Rfl:     Ginger, Zingiber officinalis, (GINGER ROOT) 500 MG CAPS, Take 1 capsule by mouth daily, Disp: , Rfl:     Turmeric Curcumin 500 MG CAPS, Take 3 capsules by mouth in the morning, at noon, and at bedtime, Disp: , Rfl:     melatonin 3 MG TABS tablet, Take 5 mg by mouth nightly as needed, Disp: , Rfl:     Garlic 640 MG CAPS, Take 1 capsule by mouth 2 times daily, Disp: , Rfl:     Calcium Carbonate-Vit D-Min (CALCIUM 1200 PO), Take 1 tablet by mouth daily (before lunch) , Disp: , Rfl:     Omega-3 Fatty Acids (FISH OIL) 1200 MG CAPS, Take 3 capsules by mouth 4 times daily (before meals and nightly) [employer] pharmaceutical grade, Disp: , Rfl:     vitamin E 400 UNIT capsule, Take 400 Units by mouth daily, Disp: , Rfl:     vitamin B-6 (PYRIDOXINE) 50 MG tablet, Take 50 mg by mouth daily , Disp: , Rfl:     Cyanocobalamin (VITAMIN B-12) 3000 MCG SUBL, Place 2 tablets under the tongue daily , Disp: , Rfl:     Biotin 2500 MCG CAPS, Take 1 tablet by mouth every other day SKIP 4 DAYS BEFORE LAB TEST, Disp: , Rfl:     NONFORMULARY, Take 1 capsule by mouth daily as needed Orega Resp usually used in October, Disp: , Rfl:     NONFORMULARY, Take 1 capsule by mouth 2 times daily as needed Respiration Blend SP-3 usually used in October, Disp: , Rfl:     Olive Leaf Extract 250 MG CAPS, Take 1 capsule by mouth 2 times daily, Disp: , Rfl:     Cetirizine HCl 10 MG CAPS, Take by mouth nightly , Disp: , Rfl:     loratadine (CLARITIN) 10 MG capsule, Take 10 mg by mouth as needed , Disp: , Rfl:   Allergies: Gluten meal, Orange oil, and Pcn [penicillins]  Immunizations:   Immunization History   Administered Date(s) Administered    COVID-19, MODERN BLUE border, Primary or Immunocompromised, (age 12y+), IM, 100 mcg/0.5mL 02/19/2021, 02/19/2021, 03/19/2021, 03/19/2021    COVID-19, PFIZER PURPLE top, DILUTE for use, (age 15 y+), 30mcg/0.3mL 11/20/2021    Influenza Whole 12/05/2008    Influenza, FLUAD, (age 72 y+), Adjuvanted, 0.5mL 10/06/2021, 09/15/2022    Influenza, FLUCELVAX, (age 10 mo+), MDCK, MDV, 0.5mL 10/17/2019, 10/17/2019    Influenza, FLUZONE (age 72 y+), High Dose, 0.7mL 08/17/2020    Influenza, High Dose (Fluzone 65 yrs and older) 12/07/2016, 10/24/2017, 10/11/2018, 08/17/2020    Pneumococcal Conjugate 13-valent ([id number]) 11/05/2018    Pneumococcal Polysaccharide ([id number]) 11/03/2020    Tdap (Boostrix, Adacel) 10/12/2020        History of Present Illness:     Jenifer's had concerns including 6 Month Follow-Up, Memory Loss (Brought a few pills. Some trouble sleeping), and Discuss Labs. Cat Lu  presents to the [address] [place] [address] today for;   Chief Complaint   Patient presents with    6 Month Follow-Up    Memory Loss     Brought a few pills. Some trouble sleeping    Discuss Labs   , abnormal labs follow up and these conditions as she  Is looking today for:     1. Vitamin D deficiency    2. Mixed hyperlipidemia    3. Other fatigue    4. Malignant neoplasm of nipple of right breast in female, unspecified estrogen receptor status ([facility] [address]    5. Encounter for herb and vitamin supplement management    6. Other intestinal malabsorption    7. Urinary frequency    8. Low glucose level    9. Elevated lipoprotein(a)    10. Thyroid disorder screen    11. Hot flashes      HPI    Subjective:     Review of Systems   All other systems reviewed and are negative. Objective:     /60 (Site: Right Upper Arm, Position: Sitting, Cuff Size: Medium Adult)   Pulse 74   Temp 97.2 °F (36.2 °C) (Temporal)   Resp 10   Ht 5' 4\" (1.626 m)   Wt 119 lb 12.8 oz (54.3 kg)   SpO2 97%   BMI 20.56 kg/m²   Physical Exam  Vitals and nursing note reviewed. Constitutional:       Appearance: Normal appearance. HENT:      Head: Normocephalic. Pulmonary:      Effort: Pulmonary effort is normal.   Neurological:      Mental Status: She is alert. Psychiatric:         Mood and Affect: Mood normal.         Thought Content: Thought content normal.          Laboratory Data:   Lab results were searched in Care Everywhere and/or those brought by the pateint were reviewed today with Eric Lucas and she has a copy of their most recent labs to take home with them as noted below;       Imaging Data:   Imaging Data:       Assessment & Plan:       Impression:  1. Vitamin D deficiency    2. Mixed hyperlipidemia    3. Other fatigue    4.  Malignant neoplasm of nipple of right breast in female, unspecified estrogen receptor status ([address] 5. Encounter for herb and vitamin supplement management    6. Other intestinal malabsorption    7. Urinary frequency    8. Low glucose level    9. Elevated lipoprotein(a)    10. Thyroid disorder screen    11. Hot flashes      Assessment and Plan:  After reviewing the patients chief complaints, reviewing their labfindings in great detail (with the patient and those accompanying them) which correlate to their chief complaints, symptoms, and or medical conditions; suggestions were made relating to changes in diet and or supplements which may improve the complaints and which will be reflected in their future lab findings; Chief Complaint   Patient presents with    6 Month Follow-Up    Memory Loss     Brought a few pills. Some trouble sleeping    Discuss Labs   ;    Plans for the next visits:  - Abnormal and non-optimal Labs were ordered today to be repeated in the next 120-365 days to assess changes from adjustments in nutrition and or nutrients. - Patient instructed when having a blood draw to ask the  to divide their lab draws into multiple draws over several days if not feeling good at the time of the lab draw or if either prefers to do several smaller blood draws over several days  -Patient instructed to check with insurer before each lab draw and to go to the lab which the insurer directs them for the most cost effective lab draw with the least patient's cost  - Rebeka Pippins  will be scheduled subsequent to those results. Chalino Red will bring in her drink, food, supplement log to her next visit    Chronic Problems Addressed on this Visit:                                   1.  Intensity of Service; Uncontrolled items at this visit; Chief Complaint   Patient presents with    6 Month Follow-Up    Memory Loss     Brought a few pills. Some trouble sleeping    Discuss Labs   ; Improved items at this visit and Stable items were discussed at this visit;  2.  Patients food, drinks, supplements and symptoms were reviewed with the patient,       - Dewayne Canales will bring food, drink, supplements and symptoms log to next visit for inclusion in their record      - 75 better food list reviewed & given to patient with the omega 6 food list to avoid      - The 52 Latex foods list was reviewed and given to the patients with the information on carrageenan         - Gluten in corn and oats abstracts sheet reviewed and given to the patient today   3. Greater than 20 minutes time was spent with the patient face to face on this visit; of which >50% was for counseling and coordination of care, as well as the time spent before and after the visit reviewing the chart, documenting the encounter, reviewing labs,reports, NIH listed studies, making phone calls, etc.      Patients food and drinks were reviewed with the patient,   - They will bring a food drink symptom log to future visits for inclusion in their record    - 75 better food list reviewed & given to patient along with the omega 6 food list to avoid      - Gluten in corn and oats abstracts sheet reviewed and given to the patient today    - 23 Foods containing Latex-like proteins was reviewed and copy to be taken if desired     - Nutrient Supplements list provided and copyto be taken if desired    - [facility]. [place] web site offered to patient to review at their convenience by staff with login information    Note:  I have discussed with the patient that with all nutraceuticals, there is often mixed data and emerging research which needs to be monitored; as well as an array of NIH fact sheets on nutrients and supplements, available at www.nih,issue plus [employer]. com plus www.lpi,org. If I have recommended cinnamon at the request of this patient to assist them in control of their blood sugar, triglyceride, and/or weight issues.   I discussed that the patient's clinical use of cinnamon bark, calcium, magnesium, Vitamin D, and pharmaceutical grade CVS omega 3 oil or triple-strength fish oil, and B-50/B-100 time-released B-complex by [address] will be for a time-limited trial to determine their individual effectiveness and safety in this patient. I also referred the patient to the NMCD: Nutrition, Metabolism, and Cardiovascular Diseases (SecuritiesCard.pl) and concerns about long-term use and hepatotoxicity of cinnamon and other nutrients. I suggested they frequently search nih.gov for the latest non-proprietary information on nutriceuticals as well as consider a subscription to boosk for details on reviewed supplements, or at the least review the nutrient files at [facility] at [facility], [address] bark, an insulin mimetic, reduces some High Carbohydrate Dietary Impacts. Methylhydroxychalcone polymers insulin-enhancing properties in fat cells are responsible for enhanced glucose uptake, inhibiting hepatic HMG-CoA reductase and lowers lipids. www.jacn. org/content/20/4/327.full     But cinnamon with additives such as Cinnamon Extract are not effective as insulin mimetics.  :eStoreDirectory.at     Nutrients for Start up from [employer] or [employer] for ease to get started now;  Dangelo Casillas has some useable products;  - Triple Strength Fish Oil, enteric coated  - Vit D-3 5000 IU gel caps  - Iron ferrous sulfate 325 mg tabs  - Centrum Silver look-a-like for most patients, or  - Centrum plain look-a-like if need iron    Local pharmacies or chains such as [employer], [facility], [facility], have:  - [employer] pharmaceutical grade omega 3 is 90% EPA/DHA whereas most Triple strength fish oil are 75% EPA/DHA  - Triple Strength Fish Oil (enteric coated if available) or if not enteric coated, can take from freezer for less burps  - B-50 or B-100 released balanced B complex tabs [facility] [address] [facility] bark 500 mg (without Chromium or extracts)   some brands list 1000 mg / serving of 2 capsules,    some brands have 1000 mg caps with the undesireable chromium extract  - Calcium carbonate/citrate, magnesium oxide/citrate, Vit D-3 as 3-4 tabs/caps/serving     Some Local Brands may contain Zinc which is acceptable for the first bottle or two  - Magnesium oxide 250 mg tabs for those having < 2 bowel movements daily  - Magnesium citrate 200 mg if having > 2 bowel movements/day  - Centrum Silver or look-a-like for most patients, Centrum plain or look-a-like with iron  - Vitamin D-3 comes as 1,000 IU or 2,000 IU or 5,000 IU gel caps or Liquid drops but keep Vitamin D levels <50 but >40     Some brands containing or derived from soy oil or corn oil are OK if not allergic to soy  - Elemental Iron 65 mg tabs at bedtime is available over the counter if need more iron     Usually turns bowel movements grey, green, or black but not a concern  - Apricot Kernel Oil (by Now) for dry skin sensitive perineal or perianal area skin    Nutrients for ongoing use by Mail order for less expense from [employer] ;  - Strength Fish Oil , 240 Softgels Item #[id number]  -B-100 time released balanced B complex Item #[id number]  - Cinnamon bark 500 mg without Chromium or extract Item #[id number]  - Calcium carbonate 1000 mg, Magnesium oxide 500 mg, Vit D-3 400 IU Item #[id number]  - Magnesium oxide 500 mg tabs Item #856158 if less than 2 bowel movements daily  - [employer] Item #472309 for most patients, One Daily Item #469676 with iron  - Vit D 3  1,000 Item #319353      2,000 IU Item #339272   Item #938040     Some brands containing orderived from soy oil or corn oil are OK if not allergic to soy    Nutrients for Special Needs by Mail order for less expense from www. puritan.com;  -Elemental Iron 65 mg tabs Item #545823 if need more iron for low iron on labs    Usually turns bowel movements grey, green or black but not a concern  - Time released Niacin 250 mg Item #[id number] for cold intolerance, low libido or impotence  - DHEA 50 mg Item #238836 for improving DHEA levels on labs if having Fatigue    If stools too loose substitute for your Magnesium oxide using;   Magnesium citrate 200 mg tabs (NOT liquid) at [employer]   Magnesium gluconate 550 mg by Adriana at [employer] or [employer]. com  Magnesium chloride foot soaks or body sprays  www.ModiFace   Magnesium chloride flakes 14.99 Item #: [id number] if back-ordered, get spray  Magnesium threonate, Magtein also helps mental clarity and sleep    Food Drink Symptom Log;  I asked this patient to track these items and any other symptoms on their list on a weekly basis to documenttheir progress or lack of same. This can be done on the symptom tracking sheet I gave them at today's visit but looks like this:                                                      Rate on scale of 0-10 with zero = not noticeable  Symptom:                            Week 1               2                 3                 4               Etc            Hair loss    Foot cramps    Paresthesia    Aches    IBS (irritable bowel)    Constipation    Diarrhea  Nocturia (up to bathroom at night)    Fatigue/Energy level  Stress      On the other side of the sheet they can track their food, drink, environment, activity, symptoms etc      Avoiding Latex-like proteins in my foods; Avocados, Bananas, Celery, Figs & Kiwi proteins have latex-like proteins to inflame our immune systems, plus 47 more foods  How Can I Have A Latex Allergy? Eating foods with latex-like protein exposes us to latex allergies. Our body cannot tell the differencebetween these latex-like proteins and latex from rubber products since many people are allergic to fruit, vegetables and latex. Read labels on pre-packaged foods. This list to avoid is only a guide if you are known allergicto latex or have a latex rash on your chin, cheeks and lines on your neck and chest. The amount of latex is different in each food product or fruit variety.   Avoid out of Season if not grown locally:   Melon, Nectarine, Papaya, Cherry, Passion fruit, Plum, Chestnuts, and Tomato. Avocado, Banana, Celery, Figs, and Kiwi always contain Latex-like protein. Whats in Season? Strawberries taste better in June than December because June is strawberry season so buy locally grown produce \"in season\" for the best flavor, cost, and less Latex. Locally grown produce not only tastes great but also requires little or no ethylene exposure in food distribution so has less latex content. Out of season: use canned, frozen, or dried since those are processed ripe and latex content is lower!!!     Month     [place] Locally Grown Produce  January, February, March: use canned, frozen or dried fruits since lower in latex  April: asparagus, radishes  May: asparagus, broccoli, green onions, greens, peas, radishes, rhubarb  June: asparagus, beets, beans, broccoli, cabbage, cantaloupe, carrots, green onions, greens, lettuce, onions, parsley, peas, radishes, rhubarb, strawberries, watermelons  July: beans, beets, blueberries, broccoli, cabbage, cantaloupe, carrots, cauliflower, celery, cucumbers, eggplant, grapes, green onions, greens, lettuce, onions, parsley, peas, peaches, bell peppers, potatoes, radishes, summer raspberries, squash, sweetcorn, tomatoes, turnips, watermelons  August: apples, beans, beets, blueberries, cabbage, cantaloupe, carrots, cauliflower, celery, cucumbers, eggplant, grapes, green onions, greens, lettuce, onions, parsley, peas, peaches, pears, bell peppers, potatoes, radishes, squash, sweet corn, tomatoes, turnips, watermelons  September: apples, beans, beets, blueberries, cabbage, cantaloupe, carrots, cauliflower, celery, cucumbers, eggplant, grapes, green onions, greens, lettuce, onions, parsley, peas, peaches, pears, bell peppers, plums, potatoes, pumpkins, radishes, fall red raspberries, squash, sweet corn, tomatoes, turnips, watermelons  October: apples, beets, broccoli, cabbage, carrots, cauliflower, celery, green onions, greens, lettuce, parsley, peas, pears, potatoes, pumpkins, radishes, fall red raspberries, squash, turnips  November: broccoli, cabbage, carrots, parsley, pears, peas  December: use canned, frozen or dried fruits since lower in latex    Upto half of latex-sensitive patients show allergic reactions to fruits (avocados, bananas, kiwifruits, papayas, peaches),   Annals of Allergy, 1994. These plants contain the same proteins that are allergens in latex. People with fruit allergies should warn physicians before undergoing procedures which may cause anaphylactic reaction if in contact with latex gloves. Some of the common foods with defined cross-reactivity to latex are avocado, banana, kiwi, chestnut, raw potato, tomato, stone fruits (e.g., peach, cherry), hazelnut, melons, celery, carrot, apple, pear, papaya, and almond. Foods with less well-defined cross-reactivity to latex are peanuts, peppers, citrus fruits, coconut, pineapple, dany, fig, passion fruit, Ugli fruit, and grape. This fruit/latex cross-reactivity is worsened by ethylene, a gas used to hasten commercial ripening. In nature, plants produce low levels of the hormone ethylene, which regulates germination, flowering, and ripening. Forced ripening by high ethylene concentrations, plants produce allergenic wound-repair proteins, which are similar to wound-repair proteins made during the tapping of rubber trees. Sensitive individuals who ingest the fruit get a higher dose and worse reaction. Some people may even first become sensitized to latex through fruit. Can food processing increase the concentrations of allergenic proteins? Latex-sensitized children (and adults) in [place] often experience allergic reactions after eating bananas ripened artificially with ethylene.  In the [place], food distribution centers treat unripe bananas and other produce with ethylene to ripen; not commonly done in [facility] [place] since fruit is tree-ripened there. Does treatment of food with ethylene induce banana proteins that cross-react with latex? (Sarai et al.)    References:   Latex in Foods Allergy, http://ehp.niehs.nih.gov/members/2003/5811/5811.html    Search web for [employer] in Season \" for where you live or are at the time you food shop   Management of Latex, ://medicalcenter. [facility].edu/  search for nih, latex-like proteins in foods

## 2022-09-19 NOTE — PATIENT INSTRUCTIONS
Thank you   Thank you for trusting us with your healthcare needs. You may receive a survey regarding today's visit. It would help us out if you would take a few moments to provide your feedback. We value your input. Please bring in ALL medications BOTTLES, including inhalers, herbal supplements, over the counter, prescribed & non-prescribed medicine. The office would like actual medication bottles and a list.   Please note our OFFICE POLICIES:   Prior to getting your labs drawn, please check with your insurance company for benefits and eligibility of lab services. Often, insurance companies cover certain tests for preventative visits only. It is patient's responsibility to see what is covered. We are unable to change a diagnosis after the test has been performed. Lab orders will not be re-printed. Please hold onto your original lab orders and take them to your lab to be completed. If you no show your scheduled appointment three times, you will be dismissed from this practice. Reschedules must be completed 24 hours prior to your schedule appointment. If the list below has been completed, PLEASE FAX RECORDS TO OUR OFFICE @ [phone].  Once the records have been received we will update your records at our office:  Health Maintenance Due   Topic Date Due    Shingles vaccine (1 of 2) Never done    COVID-19 Vaccine (4 - Booster for Moderna series) 03/20/2022

## 2022-10-14 ENCOUNTER — HOSPITAL ENCOUNTER (EMERGENCY)
Age: 77
Discharge: HOME | End: 2022-10-14
Payer: MEDICARE

## 2022-10-14 VITALS
DIASTOLIC BLOOD PRESSURE: 74 MMHG | OXYGEN SATURATION: 98 % | SYSTOLIC BLOOD PRESSURE: 132 MMHG | TEMPERATURE: 98.3 F | RESPIRATION RATE: 16 BRPM | HEART RATE: 87 BPM

## 2022-10-14 DIAGNOSIS — J06.9 UPPER RESPIRATORY TRACT INFECTION, UNSPECIFIED TYPE: Primary | ICD-10-CM

## 2022-10-14 LAB
SARS-COV-2, NAA: NORMAL
SARS-COV-2, NAA: NOT  DETECTED

## 2022-10-14 PROCEDURE — 99213 OFFICE O/P EST LOW 20 MIN: CPT | Performed by: NURSE PRACTITIONER

## 2022-10-14 PROCEDURE — 99213 OFFICE O/P EST LOW 20 MIN: CPT

## 2022-10-14 PROCEDURE — 87635 SARS-COV-2 COVID-19 AMP PRB: CPT

## 2022-10-14 ASSESSMENT — ENCOUNTER SYMPTOMS
DIARRHEA: 0
SHORTNESS OF BREATH: 0
VOMITING: 0
COUGH: 1
NAUSEA: 0
SORE THROAT: 0

## 2022-10-14 NOTE — ED PROVIDER NOTES
[facility]       CHIEF COMPLAINT       Chief Complaint   Patient presents with    Cough    Congestion       Nurses Notes reviewed and I agree except as noted in the HPI. HISTORY OF PRESENT ILLNESS   Juan J Seth is a 69 y.o. female who presents for evaluation of cough and congestion that been ongoing for the past 4 to 5 days. Patient states that her  has had similar symptoms but both have been experiencing improvement in their symptoms. States that they are going to see their daughter and grandchildren this weekend and wanted to ensure they do not have COVID before making the trip. Denies any shortness of breath, fever, or chills. The history is provided by the patient. REVIEW OF SYSTEMS     Review of Systems   Constitutional:  Negative for chills and fever. HENT:  Positive for congestion. Negative for sore throat. Respiratory:  Positive for cough. Negative for shortness of breath. Cardiovascular:  Negative for chest pain. Gastrointestinal:  Negative for diarrhea, nausea and vomiting. Musculoskeletal:  Negative for arthralgias and myalgias. Skin:  Negative for rash. Allergic/Immunologic: Negative for environmental allergies. Neurological:  Negative for headaches. PAST MEDICAL HISTORY         Diagnosis Date    Cancer [facility])        SURGICALHISTORY     Patient  has a past surgical history that includes Breast surgery and Finger trigger release (Right, 2017).     CURRENT MEDICATIONS       Previous Medications    B COMPLEX-FOLIC ACID (U-928 BALANCED TR PO)    Take 1 tablet by mouth 3 times daily (before meals) [address] [facility] 66 2500 MCG CAPS    Take 1 tablet by mouth every other day SKIP 4 DAYS BEFORE LAB TEST    CALCIUM CARBONATE-VIT D-MIN (CALCIUM 1200 PO)    Take 1 tablet by mouth daily (before lunch)     CETIRIZINE HCL 10 MG CAPS    Take by mouth nightly     CINNAMON 500 MG CAPS    Take 500 mg by mouth 3 times daily     COENZYME Q10-RED YEAST RICE  MG CAPS    Take 1 capsule by mouth 2 times daily (before meals) Supper and bedtime    CYANOCOBALAMIN (VITAMIN B-12) 3000 MCG SUBL    Place 2 tablets under the tongue daily     DIPHENHYDRAMINE-PE-APAP 25-5-325 MG TABS    Take by mouth as needed    FLUTICASONE (FLONASE) 50 MCG/ACT NASAL SPRAY    1 spray by Each Nostril route daily    GARLIC 815 MG CAPS    Take 1 capsule by mouth 2 times daily    GINGER, ZINGIBER OFFICINALIS, (GINGER ROOT) 500 MG CAPS    Take 1 capsule by mouth daily    LETROZOLE (FEMARA) 2.5 MG TABLET    Take 1 tablet by mouth daily    LORATADINE (CLARITIN) 10 MG CAPSULE    Take 10 mg by mouth as needed     MELATONIN 3 MG TABS TABLET    Take 5 mg by mouth nightly as needed    NONFORMULARY    Take 1 capsule by mouth daily as needed Orega Resp usually used in October    NONFORMULARY    Take 1 capsule by mouth 2 times daily as needed Respiration Blend SP-3 usually used in October    NONFORMULARY    Take 1 capsule by mouth 3 times daily [employer] by Source Natural    NONFORMULARY    Take 1 capsule by mouth 2 times daily (with meals) BREAKFAST AND LUNCH [employer] [facility] phosphatidyl serine + bacopa monnieri    NONFORMULARY    Take by mouth multidophilus    OLIVE LEAF EXTRACT 250 MG CAPS    Take 1 capsule by mouth 2 times daily    OMEGA-3 FATTY ACIDS (FISH OIL) 1200 MG CAPS    Take 3 capsules by mouth 4 times daily (before meals and nightly) [facility] pharmaceutical grade    POTASSIUM 99 MG TABS    Take 1 capsule by mouth every other day     PSEUDOEPHEDRINE (SUDAFED) 30 MG TABLET    Take 30 mg by mouth every 4 hours as needed for Congestion    RED YEAST RICE 600 MG CAPS    Take 1 capsule by mouth Daily with supper    TURMERIC CURCUMIN 500 MG CAPS    Take 3 capsules by mouth in the morning, at noon, and at bedtime    VITAMIN B-6 (PYRIDOXINE) 50 MG TABLET    Take 50 mg by mouth daily     VITAMIN D (CHOLECALCIFEROL) 5000 UNITS CAPS CAPSULE    Take 5,000 Units by mouth once a week STOP TIL MARCH THEN WEEKLY    VITAMIN E 400 UNIT CAPSULE    Take 400 Units by mouth daily    ZOSTER RECOMBINANT ADJUVANTED VACCINE (SHINGRIX) 50 MCG/0.5ML SUSR INJECTION    Inject 0.5 mLs into the muscle See Admin Instructions 1 dose now and repeat in 2-6 months       ALLERGIES     Patient is is allergic to gluten meal, orange oil, and pcn [penicillins]. Patients   Immunization History   Administered Date(s) Administered    COVID-19, MODERNA BLUE border, Primary or Immunocompromised, (age 12y+), IM, 100 mcg/0.5mL 02/19/2021, 02/19/2021, 03/19/2021, 03/19/2021    COVID-19, PFIZER PURPLE top, DILUTE for use, (age 15 y+), 30mcg/0.3mL 11/20/2021    Influenza Whole 12/05/2008    Influenza, FLUAD, (age 72 y+), Adjuvanted, 0.5mL 10/06/2021, 09/15/2022    Influenza, FLUCELVAX, (age 10 mo+), MDCK, MDV, 0.5mL 10/17/2019, 10/17/2019    Influenza, FLUZONE (age 72 y+), High Dose, 0.7mL 08/17/2020    Influenza, High Dose (Fluzone 65 yrs and older) 12/07/2016, 10/24/2017, 10/11/2018, 08/17/2020    Pneumococcal Conjugate 13-valent ([id number]) 11/05/2018    Pneumococcal Polysaccharide ([id number]) 11/03/2020    Tdap (Boostrix, Adacel) 10/12/2020       FAMILY HISTORY     Patient's family history includes Breast Cancer in her sister; Cancer in her mother; Diabetes in her mother; High Blood Pressure in her brother and mother; No Known Problems in her brother, brother, and father. SOCIAL HISTORY     Patient  reports that she has never smoked. She has never used smokeless tobacco. She reports that she does not drink alcohol and does not use drugs. PHYSICAL EXAM     ED TRIAGE VITALS  BP: 132/74, Temp: 98.3 °F (36.8 °C), Heart Rate: 87, Resp: 16, SpO2: 98 %,Estimated body mass index is 20.56 kg/m² as calculated from the following:    Height as of 9/19/22: 5' 4\" (1.626 m). Weight as of 9/19/22: 119 lb 12.8 oz (54.3 kg). ,No LMP recorded.  Patient is postmenopausal.    Physical Exam  Vitals and nursing note reviewed. Constitutional:       General: She is not in acute distress. Appearance: She is well-developed. She is not diaphoretic. HENT:      Right Ear: Tympanic membrane and ear canal normal.      Left Ear: Tympanic membrane and ear canal normal.      Mouth/Throat:      Mouth: Mucous membranes are moist.      Pharynx: Oropharynx is clear. Eyes:      Conjunctiva/sclera:      Right eye: Right conjunctiva is not injected. Left eye: Left conjunctiva is not injected. Pupils: Pupils are equal.   Cardiovascular:      Rate and Rhythm: Normal rate and regular rhythm. Heart sounds: No murmur heard. Pulmonary:      Effort: Pulmonary effort is normal. No respiratory distress. Breath sounds: Normal breath sounds. Musculoskeletal:      Cervical back: Normal range of motion. Lymphadenopathy:      Head:      Right side of head: No tonsillar adenopathy. Left side of head: No tonsillar adenopathy. Skin:     General: Skin is warm. Findings: No rash. Neurological:      Mental Status: She is alert and oriented to person, place, and time. Psychiatric:         Behavior: Behavior normal.       DIAGNOSTIC RESULTS     Labs:  Results for orders placed or performed during the hospital encounter of 10/14/22   COVID-19, Rapid   Result Value Ref Range    SARS-CoV-2, MARTA NOT  DETECTED NOT DETECTED   COVID-19, Rapid   Result Value Ref Range    SARS-CoV-2, MARTA NOT  DETECTED NOT DETECTED       IMAGING:    No orders to display         EKG:      URGENT CARE COURSE:     Vitals:    10/14/22 1349   BP: 132/74   Pulse: 87   Resp: 16   Temp: 98.3 °F (36.8 °C)   TempSrc: Temporal   SpO2: 98%       Medications - No data to display         PROCEDURES:  None    FINAL IMPRESSION      1. Upper respiratory tract infection, unspecified type          DISPOSITION/ PLAN       COVID test is negative at this time.   I discussed with the patient that because her symptoms are improving, I do not believe there is any other intervention required at this time. Advised to rest and hydrate at home and follow-up on an outpatient basis as needed. Patient is agreeable to plan as discussed.     PATIENT REFERRED TO:  MATTHEW Hong CNP  [address] [address]      DISCHARGE MEDICATIONS:  New Prescriptions    No medications on file       Discontinued Medications    No medications on file       Current Discharge Medication List          MATTHEW Juárez CNP    (Please note that portions of this note were completed with a voice recognition program. Efforts were made to edit the dictations but occasionally words are mis-transcribed.)          MATTHEW Juárez CNP  10/14/22 7698

## 2022-10-18 ENCOUNTER — TELEPHONE (OUTPATIENT)
Dept: FAMILY MEDICINE CLINIC | Age: 77
End: 2022-10-18

## 2022-10-18 ENCOUNTER — OFFICE VISIT (OUTPATIENT)
Dept: FAMILY MEDICINE CLINIC | Age: 77
End: 2022-10-18
Payer: MEDICARE

## 2022-10-18 VITALS
RESPIRATION RATE: 18 BRPM | SYSTOLIC BLOOD PRESSURE: 132 MMHG | HEART RATE: 80 BPM | WEIGHT: 120.6 LBS | HEIGHT: 64 IN | DIASTOLIC BLOOD PRESSURE: 62 MMHG | BODY MASS INDEX: 20.59 KG/M2 | TEMPERATURE: 97.8 F

## 2022-10-18 DIAGNOSIS — J40 BRONCHITIS: Primary | ICD-10-CM

## 2022-10-18 PROCEDURE — G8420 CALC BMI NORM PARAMETERS: HCPCS | Performed by: NURSE PRACTITIONER

## 2022-10-18 PROCEDURE — 1090F PRES/ABSN URINE INCON ASSESS: CPT | Performed by: NURSE PRACTITIONER

## 2022-10-18 PROCEDURE — 1036F TOBACCO NON-USER: CPT | Performed by: NURSE PRACTITIONER

## 2022-10-18 PROCEDURE — G8484 FLU IMMUNIZE NO ADMIN: HCPCS | Performed by: NURSE PRACTITIONER

## 2022-10-18 PROCEDURE — 1123F ACP DISCUSS/DSCN MKR DOCD: CPT | Performed by: NURSE PRACTITIONER

## 2022-10-18 PROCEDURE — G8427 DOCREV CUR MEDS BY ELIG CLIN: HCPCS | Performed by: NURSE PRACTITIONER

## 2022-10-18 PROCEDURE — G8400 PT W/DXA NO RESULTS DOC: HCPCS | Performed by: NURSE PRACTITIONER

## 2022-10-18 PROCEDURE — 99213 OFFICE O/P EST LOW 20 MIN: CPT | Performed by: NURSE PRACTITIONER

## 2022-10-18 RX ORDER — AZITHROMYCIN 250 MG/1
250 TABLET, FILM COATED ORAL SEE ADMIN INSTRUCTIONS
Qty: 6 TABLET | Refills: 0 | Status: SHIPPED | OUTPATIENT
Start: 2022-10-18 | End: 2022-10-23

## 2022-10-18 ASSESSMENT — ENCOUNTER SYMPTOMS
BLOOD IN STOOL: 0
WHEEZING: 1
COUGH: 1
DIARRHEA: 0
CHEST TIGHTNESS: 1
VOICE CHANGE: 1
SHORTNESS OF BREATH: 0
RHINORRHEA: 1
CONSTIPATION: 0
NAUSEA: 0
VOMITING: 0

## 2022-10-18 NOTE — PROGRESS NOTES
Chief Complaint   Patient presents with    Cough     Patient states that she has been experiencing a cough for the past 4 days. Patient is taking OTC mucinex. Dizziness     Patient states that she started to experiencing dizziness this am.         SUBJECTIVE     Javier Edge is a 69 y. o.female    Patient reports she started with a cold about 2 weeks ago. She was feeling pretty well until about 4 days ago when her cough increased. She does note chest tightness and wheezing. Reports she is bringing up thick mucus from her chest from coughing. Is using mucinex and aloe vera juice with some relief. She noticed some dizziness starting this morning. Finds she is veering off to the side. Denies any weakness, slurred speech. Review of Systems   Constitutional:  Positive for fatigue (mild). Negative for chills, diaphoresis and fever. HENT:  Positive for rhinorrhea and voice change. Respiratory:  Positive for cough, chest tightness and wheezing. Negative for shortness of breath. Cardiovascular:  Negative for chest pain, palpitations and leg swelling. Gastrointestinal:  Negative for blood in stool, constipation, diarrhea, nausea and vomiting. Genitourinary:  Negative for dysuria and hematuria. Musculoskeletal:  Negative for myalgias. Neurological:  Negative for dizziness (mild) and headaches. All other systems reviewed and are negative. OBJECTIVE     /62 (Site: Right Upper Arm, Position: Sitting, Cuff Size: Medium Adult)   Pulse 80   Temp 97.8 °F (36.6 °C) (Temporal)   Resp 18   Ht 5' 4\" (1.626 m)   Wt 120 lb 9.6 oz (54.7 kg)   BMI 20.70 kg/m²     Physical Exam  Vitals and nursing note reviewed. Constitutional:       Appearance: She is well-developed. She is ill-appearing. HENT:      Head: Normocephalic and atraumatic.       Right Ear: External ear normal.      Left Ear: External ear normal.      Nose: Nose normal.   Eyes:      Conjunctiva/sclera: Conjunctivae normal. Pupils: Pupils are equal, round, and reactive to light. Cardiovascular:      Rate and Rhythm: Normal rate and regular rhythm. Heart sounds: Normal heart sounds. Pulmonary:      Effort: Pulmonary effort is normal.      Breath sounds: Wheezing present. Abdominal:      General: Bowel sounds are normal.      Palpations: Abdomen is soft. Musculoskeletal:         General: Normal range of motion. Cervical back: Normal range of motion and neck supple. Skin:     General: Skin is warm and dry. Neurological:      Mental Status: She is alert and oriented to person, place, and time. Deep Tendon Reflexes: Reflexes are normal and symmetric. Psychiatric:         Behavior: Behavior normal.         Thought Content: Thought content normal.         Judgment: Judgment normal.         No results found for this visit on 10/18/22. ASSESSMENT       Diagnosis Orders   1.  Bronchitis  azithromycin (ZITHROMAX) 250 MG tablet          PLAN     Requested Prescriptions     Signed Prescriptions Disp Refills    azithromycin (ZITHROMAX) 250 MG tablet 6 tablet 0     Sig: Take 1 tablet by mouth See Admin Instructions for 5 days 500mg on day 1 followed by 250mg on days 2 - 5           Pt would like to hold on steroid or inhaler at this time and just tr antibiotic  Z-pack sent to pharmacy  Increase fluids and rest  RTO if symptoms worsen or stay the same      Electronically signed by MATTHEW Patel CNP on 10/18/2022 at 10:30 AM

## 2022-10-18 NOTE — LETTER
[facility]  [address], [address]  Phone: [phone]  Fax: [phone]    October 18, 2022    Blanca Reynaga  [address]      Dear Derrick Burton,    This letter is regarding your Emergency Department (ED) visit at [facility] on 10/14/22. Dr. Ashley Mix wanted to make sure that you understand your discharge instructions and that you were able to fill any prescriptions that may have been ordered for you. Please contact the office at the above phone number if the ED advised you to follow up with Dr. Ashley Mix, or if you have any further questions or needs. Also did you know -   *Visiting the ED for a non-emergency could result in higher co-pays than you would normally be subject to paying? *You can call your doctor even after hours so they can direct you to the most appropriate care. [facility]) practices can often offer you an appointment on the same day that you call. *We have some [address] [facility] offices that offer Walk-in appointments; check our website for availability in your community, www. [employer] [place].      *Evisits are now available for patients for $36 through [employer] for certain conditions:  * Sinus, cold and or cough       * Diarrhea            * Headache  * Heartburn                                * Poison Grisel          * Back pain     * Urinary problems                         If you do not have [employer] and are interested, please contact the office and a staff member may assist you or go to [email or web address].     Sincerely,     MATTHEW Regalado CNP and your [facility]

## 2022-10-26 ENCOUNTER — OFFICE VISIT (OUTPATIENT)
Dept: FAMILY MEDICINE CLINIC | Age: 77
End: 2022-10-26
Payer: MEDICARE

## 2022-10-26 ENCOUNTER — TELEPHONE (OUTPATIENT)
Dept: FAMILY MEDICINE CLINIC | Age: 77
End: 2022-10-26

## 2022-10-26 VITALS
BODY MASS INDEX: 20.32 KG/M2 | WEIGHT: 118.4 LBS | HEART RATE: 72 BPM | DIASTOLIC BLOOD PRESSURE: 60 MMHG | RESPIRATION RATE: 16 BRPM | SYSTOLIC BLOOD PRESSURE: 136 MMHG

## 2022-10-26 DIAGNOSIS — B02.9 HERPES ZOSTER WITHOUT COMPLICATION: Primary | ICD-10-CM

## 2022-10-26 PROCEDURE — G8484 FLU IMMUNIZE NO ADMIN: HCPCS | Performed by: NURSE PRACTITIONER

## 2022-10-26 PROCEDURE — 1036F TOBACCO NON-USER: CPT | Performed by: NURSE PRACTITIONER

## 2022-10-26 PROCEDURE — 1090F PRES/ABSN URINE INCON ASSESS: CPT | Performed by: NURSE PRACTITIONER

## 2022-10-26 PROCEDURE — 1123F ACP DISCUSS/DSCN MKR DOCD: CPT | Performed by: NURSE PRACTITIONER

## 2022-10-26 PROCEDURE — G8420 CALC BMI NORM PARAMETERS: HCPCS | Performed by: NURSE PRACTITIONER

## 2022-10-26 PROCEDURE — G8400 PT W/DXA NO RESULTS DOC: HCPCS | Performed by: NURSE PRACTITIONER

## 2022-10-26 PROCEDURE — G8427 DOCREV CUR MEDS BY ELIG CLIN: HCPCS | Performed by: NURSE PRACTITIONER

## 2022-10-26 PROCEDURE — 99213 OFFICE O/P EST LOW 20 MIN: CPT | Performed by: NURSE PRACTITIONER

## 2022-10-26 RX ORDER — FAMCICLOVIR 500 MG/1
500 TABLET, FILM COATED ORAL 3 TIMES DAILY
Qty: 21 TABLET | Refills: 0 | Status: SHIPPED | OUTPATIENT
Start: 2022-10-26 | End: 2022-11-02

## 2022-10-26 RX ORDER — LIDOCAINE 50 MG/G
1 PATCH TOPICAL DAILY
Qty: 30 PATCH | Refills: 0 | Status: SHIPPED | OUTPATIENT
Start: 2022-10-26 | End: 2022-11-25

## 2022-10-26 ASSESSMENT — ENCOUNTER SYMPTOMS
DIARRHEA: 0
RHINORRHEA: 0
COUGH: 0
SORE THROAT: 0
SHORTNESS OF BREATH: 0

## 2022-10-26 NOTE — TELEPHONE ENCOUNTER
PA approved. This drug has been approved under the Member's Medicare Part D benefit. Approved quantity: 30 units per 30 day(s). Pharmacy and patient notified.

## 2022-10-26 NOTE — TELEPHONE ENCOUNTER
PA for Lidocaine 5% patch was started on CoverMyMeds today. Will await response. Pt would like a call back with the determination.

## 2022-10-26 NOTE — PROGRESS NOTES
[facility]  [address] [facility] [address]  Dept: [phone]  Dept Fax: [phone]: [phone]     Visit Date:  10/26/2022      Patient:  Olena Montgomery  YOB: 1945    HPI:     Chief Complaint   Patient presents with    Skin Problem     Pt believes that she has the start of shingles on her right side. Pt states that the spot on her right side feels better if she puts her hand over it. Pt also states that her arm hurts when she moves it. Pt presents to the office today for possible shingles. Has had 2 prior episodes and this is how it started. Pain started yesterday and when she put her coat on for work the pain was made worse with rubbing. No lesions yet. No fever or chills. Skin Problem  This is a new problem. The problem has been gradually worsening since onset. The affected locations include the right shoulder. The rash is characterized by burning. She was exposed to nothing. Pertinent negatives include no anorexia, congestion, cough, diarrhea, facial edema, fatigue, fever, joint pain, rhinorrhea, shortness of breath or sore throat. Past treatments include analgesics and cold compress. Her past medical history is significant for varicella. There is no history of allergies, asthma or eczema.      Medications    Current Outpatient Medications:     famciclovir (FAMVIR) 500 MG tablet, Take 1 tablet by mouth 3 times daily for 7 days, Disp: 21 tablet, Rfl: 0    lidocaine (LIDODERM) 5 %, Place 1 patch onto the skin daily 12 hours on, 12 hours off., Disp: 30 patch, Rfl: 0    Red Yeast Rice 600 MG CAPS, Take 1 capsule by mouth Daily with supper, Disp: , Rfl:     zoster recombinant adjuvanted vaccine (SHINGRIX) 50 MCG/0.5ML SUSR injection, Inject 0.5 mLs into the muscle See Admin Instructions 1 dose now and repeat in 2-6 months, Disp: 0.5 mL, Rfl: 0    pseudoephedrine (SUDAFED) 30 MG tablet, Take 30 mg by mouth every 4 hours as needed for Congestion, Disp: , Rfl:     fluticasone (FLONASE) 50 MCG/ACT nasal spray, 1 spray by Each Nostril route daily, Disp: 16 g, Rfl: 0    NONFORMULARY, Take by mouth multidophilus, Disp: , Rfl:     NONFORMULARY, Take 1 capsule by mouth 2 times daily (with meals) BREAKFAST AND LUNCH [facility] phosphatidyl serine + bacopa monnieri, Disp: , Rfl:     Coenzyme Q10-Red Yeast Rice  MG CAPS, Take 1 capsule by mouth 2 times daily (before meals) Supper and bedtime, Disp: , Rfl:     letrozole (FEMARA) 2.5 MG tablet, Take 1 tablet by mouth daily, Disp: , Rfl: 3    NONFORMULARY, Take 1 capsule by mouth 3 times daily [facility], Disp: , Rfl:     B Complex-Folic Acid (V-934 BALANCED TR PO), Take 1 tablet by mouth 3 times daily (before meals) [address] [facility] [employer] , Disp: , Rfl:     Potassium 99 MG TABS, Take 1 capsule by mouth every other day , Disp: , Rfl:     diphenhydrAMINE-PE-APAP 25-5-325 MG TABS, Take by mouth as needed, Disp: , Rfl:     Cinnamon 500 MG CAPS, Take 500 mg by mouth 3 times daily , Disp: , Rfl:     Ginger, Zingiber officinalis, (GINGER ROOT) 500 MG CAPS, Take 1 capsule by mouth daily, Disp: , Rfl:     Turmeric Curcumin 500 MG CAPS, Take 3 capsules by mouth in the morning, at noon, and at bedtime, Disp: , Rfl:     melatonin 3 MG TABS tablet, Take 5 mg by mouth nightly as needed, Disp: , Rfl:     Garlic 100 MG CAPS, Take 1 capsule by mouth 2 times daily, Disp: , Rfl:     Calcium Carbonate-Vit D-Min (CALCIUM 1200 PO), Take 1 tablet by mouth daily (before lunch) , Disp: , Rfl:     Omega-3 Fatty Acids (FISH OIL) 1200 MG CAPS, Take 3 capsules by mouth 4 times daily (before meals and nightly) [employer] pharmaceutical grade, Disp: , Rfl:     vitamin E 400 UNIT capsule, Take 400 Units by mouth daily, Disp: , Rfl:     vitamin B-6 (PYRIDOXINE) 50 MG tablet, Take 50 mg by mouth daily , Disp: , Rfl:     Cyanocobalamin (VITAMIN B-12) 3000 MCG SUBL, Place 2 tablets under the tongue daily , Disp: , Rfl:     Biotin 2500 MCG CAPS, Take 1 tablet by mouth every other day SKIP 4 DAYS BEFORE LAB TEST, Disp: , Rfl:     NONFORMULARY, Take 1 capsule by mouth daily as needed Orega Resp usually used in October, Disp: , Rfl:     NONFORMULARY, Take 1 capsule by mouth 2 times daily as needed Respiration Blend SP-3 usually used in October, Disp: , Rfl:     Olive Leaf Extract 250 MG CAPS, Take 1 capsule by mouth 2 times daily, Disp: , Rfl:     Cetirizine HCl 10 MG CAPS, Take by mouth nightly , Disp: , Rfl:     loratadine (CLARITIN) 10 MG capsule, Take 10 mg by mouth as needed , Disp: , Rfl:     vitamin D (CHOLECALCIFEROL) 5000 units CAPS capsule, Take 5,000 Units by mouth once a week STOP TIL MARCH THEN WEEKLY (Patient not taking: Reported on 10/26/2022), Disp: , Rfl:     The patient is allergic to gluten meal, orange oil, and pcn [penicillins]. Past Medical History  Fox Burks  has a past medical history of Cancer ([facility] [address] Subjective:      Review of Systems   Constitutional:  Negative for fatigue and fever. HENT:  Negative for congestion, rhinorrhea and sore throat. Respiratory:  Negative for cough and shortness of breath. Gastrointestinal:  Negative for anorexia and diarrhea. Musculoskeletal:  Negative for joint pain. Objective:     /60   Pulse 72   Resp 16   Wt 118 lb 6.4 oz (53.7 kg)   BMI 20.32 kg/m²     Physical Exam  Vitals reviewed. Constitutional:       General: She is not in acute distress. Appearance: She is well-developed. HENT:      Head: Normocephalic and atraumatic. Nose: Nose normal. No congestion. Mouth/Throat:      Mouth: Mucous membranes are moist.      Pharynx: Oropharynx is clear. No oropharyngeal exudate. Eyes:      General:         Right eye: No discharge. Left eye: No discharge. Conjunctiva/sclera: Conjunctivae normal.   Neck:     Cardiovascular:      Rate and Rhythm: Normal rate and regular rhythm.       Heart sounds: Normal heart sounds. Pulmonary:      Effort: Pulmonary effort is normal. No respiratory distress. Breath sounds: Normal breath sounds. Skin:     General: Skin is warm and dry. Neurological:      General: No focal deficit present. Mental Status: She is alert and oriented to person, place, and time. Coordination: Coordination normal.   Psychiatric:         Mood and Affect: Mood normal.         Behavior: Behavior normal.         Thought Content: Thought content normal.         Judgment: Judgment normal.       Assessment/Plan:      Mar Finney was seen today for skin problem. Diagnoses and all orders for this visit:    Herpes zoster without complication  -     famciclovir (FAMVIR) 500 MG tablet; Take 1 tablet by mouth 3 times daily for 7 days  -     lidocaine (LIDODERM) 5 %; Place 1 patch onto the skin daily 12 hours on, 12 hours off.    - rest and increase fluids  - Use lidocaine patch for pain  - May also use naprosyn or ibuprofen as needed for pain  - Call office with any questions or concerns, or if symptoms are getting worse or changing      Return if symptoms worsen or fail to improve. Patient given educational materials - see patient instructions. Discussed use, benefit, and side effects of prescribed medications. All patient questions answered. Pt voiced understanding.         Electronically signed by MATTHEW Trejo CNP on 10/26/2022 at 10:21 AM

## 2022-11-13 ENCOUNTER — HOSPITAL ENCOUNTER (EMERGENCY)
Age: 77
Discharge: HOME | End: 2022-11-13
Payer: MEDICARE

## 2022-11-13 VITALS
DIASTOLIC BLOOD PRESSURE: 69 MMHG | OXYGEN SATURATION: 96 % | HEIGHT: 64 IN | HEART RATE: 71 BPM | BODY MASS INDEX: 19.29 KG/M2 | WEIGHT: 113 LBS | TEMPERATURE: 98.2 F | SYSTOLIC BLOOD PRESSURE: 123 MMHG | RESPIRATION RATE: 18 BRPM

## 2022-11-13 DIAGNOSIS — J20.9 ACUTE BRONCHITIS, UNSPECIFIED ORGANISM: Primary | ICD-10-CM

## 2022-11-13 LAB
FLU A ANTIGEN: NEGATIVE
FLU B ANTIGEN: NEGATIVE
SARS-COV-2, NAA: NOT  DETECTED

## 2022-11-13 PROCEDURE — 87804 INFLUENZA ASSAY W/OPTIC: CPT

## 2022-11-13 PROCEDURE — 87635 SARS-COV-2 COVID-19 AMP PRB: CPT

## 2022-11-13 PROCEDURE — 99213 OFFICE O/P EST LOW 20 MIN: CPT

## 2022-11-13 PROCEDURE — 99213 OFFICE O/P EST LOW 20 MIN: CPT | Performed by: NURSE PRACTITIONER

## 2022-11-13 RX ORDER — ALBUTEROL SULFATE 90 UG/1
2 AEROSOL, METERED RESPIRATORY (INHALATION) EVERY 4 HOURS PRN
Qty: 18 G | Refills: 0 | Status: SHIPPED | OUTPATIENT
Start: 2022-11-13 | End: 2022-12-01 | Stop reason: SDUPTHER

## 2022-11-13 RX ORDER — PREDNISONE 20 MG/1
40 TABLET ORAL DAILY
Qty: 10 TABLET | Refills: 0 | Status: SHIPPED | OUTPATIENT
Start: 2022-11-13 | End: 2022-11-18

## 2022-11-13 RX ORDER — BENZONATATE 200 MG/1
200 CAPSULE ORAL 3 TIMES DAILY PRN
Qty: 21 CAPSULE | Refills: 0 | Status: SHIPPED | OUTPATIENT
Start: 2022-11-13 | End: 2022-11-20

## 2022-11-13 ASSESSMENT — PAIN DESCRIPTION - DESCRIPTORS: DESCRIPTORS: ACHING

## 2022-11-13 ASSESSMENT — ENCOUNTER SYMPTOMS
SHORTNESS OF BREATH: 0
DIARRHEA: 0
NAUSEA: 0
COUGH: 1
SORE THROAT: 0
VOMITING: 0
SINUS PRESSURE: 1

## 2022-11-13 ASSESSMENT — PAIN DESCRIPTION - LOCATION: LOCATION: HEAD

## 2022-11-13 ASSESSMENT — PAIN - FUNCTIONAL ASSESSMENT
PAIN_FUNCTIONAL_ASSESSMENT: ACTIVITIES ARE NOT PREVENTED
PAIN_FUNCTIONAL_ASSESSMENT: 0-10

## 2022-11-13 ASSESSMENT — PAIN DESCRIPTION - PAIN TYPE: TYPE: ACUTE PAIN

## 2022-11-13 ASSESSMENT — PAIN DESCRIPTION - FREQUENCY: FREQUENCY: CONTINUOUS

## 2022-11-13 ASSESSMENT — PAIN SCALES - GENERAL: PAINLEVEL_OUTOF10: 2

## 2022-11-13 ASSESSMENT — PAIN DESCRIPTION - ONSET: ONSET: GRADUAL

## 2022-11-13 NOTE — ED TRIAGE NOTES
Nasopharyngeal covid and nasal influenza swabs obtained without difficulty and tolerated well by patient.

## 2022-11-13 NOTE — ED PROVIDER NOTES
[facility] 36  Urgent Care Encounter       CHIEF COMPLAINT       Chief Complaint   Patient presents with    Cough    Concern For COVID-19    Headache       Nurses Notes reviewed and I agree except as noted in the HPI. HISTORY OF PRESENT ILLNESS   Cyndie Andino is a 69 y.o. female who presents for evaluation of cough, congestion, headache and mild dizziness. Patient reports a history of vertigo and states that she has been taking meclizine for dizziness and that this significantly helps. She denies any chest tightness or shortness of breath. Patient reports that she did have a fever for the first day but has not had any recently. She reports that she typically experiences the symptoms with weather changes and does not believe that it is infectious in nature. She denies any known sick exposures. The history is provided by the patient. REVIEW OF SYSTEMS     Review of Systems   Constitutional:  Negative for chills and fever. HENT:  Positive for congestion and sinus pressure. Negative for sore throat. Respiratory:  Positive for cough. Negative for shortness of breath. Cardiovascular:  Negative for chest pain. Gastrointestinal:  Negative for diarrhea, nausea and vomiting. Musculoskeletal:  Negative for arthralgias and myalgias. Skin:  Negative for rash. Allergic/Immunologic: Negative for immunocompromised state. Neurological:  Positive for dizziness and headaches. PAST MEDICAL HISTORY         Diagnosis Date    Cancer [facility])        SURGICALHISTORY     Patient  has a past surgical history that includes Breast surgery and Finger trigger release (Right, 2017).     CURRENT MEDICATIONS       Previous Medications    B COMPLEX-FOLIC ACID (E-897 BALANCED TR PO)    Take 1 tablet by mouth 3 times daily (before meals) [id number] [facility] [place] [id number] 2500 MCG CAPS    Take 1 tablet by mouth every other day SKIP 4 DAYS BEFORE LAB TEST    CALCIUM CARBONATE-VIT D-MIN (CALCIUM 1200 PO)    Take 1 tablet by mouth daily (before lunch)     CETIRIZINE HCL 10 MG CAPS    Take by mouth nightly     CINNAMON 500 MG CAPS    Take 500 mg by mouth 3 times daily     COENZYME Q10-RED YEAST RICE  MG CAPS    Take 1 capsule by mouth 2 times daily (before meals) Supper and bedtime    CYANOCOBALAMIN (VITAMIN B-12) 3000 MCG SUBL    Place 2 tablets under the tongue daily     DIPHENHYDRAMINE-PE-APAP 25-5-325 MG TABS    Take by mouth as needed    FLUTICASONE (FLONASE) 50 MCG/ACT NASAL SPRAY    1 spray by Each Nostril route daily    GARLIC 742 MG CAPS    Take 1 capsule by mouth 2 times daily    GINGER, ZINGIBER OFFICINALIS, (GINGER ROOT) 500 MG CAPS    Take 1 capsule by mouth daily    LETROZOLE (FEMARA) 2.5 MG TABLET    Take 1 tablet by mouth daily    LIDOCAINE (LIDODERM) 5 %    Place 1 patch onto the skin daily 12 hours on, 12 hours off.     LORATADINE (CLARITIN) 10 MG CAPSULE    Take 10 mg by mouth as needed     MELATONIN 3 MG TABS TABLET    Take 5 mg by mouth nightly as needed    NONFORMULARY    Take 1 capsule by mouth daily as needed Orega Resp usually used in October    NONFORMULARY    Take 1 capsule by mouth 2 times daily as needed Respiration Blend SP-3 usually used in October    NONFORMULARY    Take 1 capsule by mouth 3 times daily [facility] Natural    NONFORMULARY    Take 1 capsule by mouth 2 times daily (with meals) BREAKFAST AND LUNCH -[facility] [email or web address] [facility] phosphatidyl serine + bacopa monnieri    NONFORMULARY    Take by mouth multidophilus    OLIVE LEAF EXTRACT 250 MG CAPS    Take 1 capsule by mouth 2 times daily    OMEGA-3 FATTY ACIDS (FISH OIL) 1200 MG CAPS    Take 3 capsules by mouth 4 times daily (before meals and nightly) [facility] pharmaceutical grade    POTASSIUM 99 MG TABS    Take 1 capsule by mouth every other day     PSEUDOEPHEDRINE (SUDAFED) 30 MG TABLET    Take 30 mg by mouth every 4 hours as needed for Congestion    RED YEAST RICE 600 MG CAPS    Take 1 capsule by mouth Daily with supper    TURMERIC CURCUMIN 500 MG CAPS    Take 3 capsules by mouth in the morning, at noon, and at bedtime    VITAMIN B-6 (PYRIDOXINE) 50 MG TABLET    Take 50 mg by mouth daily     VITAMIN D (CHOLECALCIFEROL) 5000 UNITS CAPS CAPSULE    Take 5,000 Units by mouth once a week STOP TIL MARCH THEN WEEKLY    VITAMIN E 400 UNIT CAPSULE    Take 400 Units by mouth daily    ZOSTER RECOMBINANT ADJUVANTED VACCINE (SHINGRIX) 50 MCG/0.5ML SUSR INJECTION    Inject 0.5 mLs into the muscle See Admin Instructions 1 dose now and repeat in 2-6 months       ALLERGIES     Patient is is allergic to gluten meal, orange oil, and pcn [penicillins]. Patients   Immunization History   Administered Date(s) Administered    COVID-19, MODERNA BLUE border, Primary or Immunocompromised, (age 12y+), IM, 100 mcg/0.5mL 02/19/2021, 02/19/2021, 03/19/2021, 03/19/2021    COVID-19, PFIZER PURPLE top, DILUTE for use, (age 15 y+), 30mcg/0.3mL 11/20/2021    Influenza Whole 12/05/2008    Influenza, FLUAD, (age 72 y+), Adjuvanted, 0.5mL 10/06/2021, 09/15/2022    Influenza, FLUCELVAX, (age 10 mo+), MDCK, MDV, 0.5mL 10/17/2019, 10/17/2019    Influenza, FLUZONE (age 72 y+), High Dose, 0.7mL 08/17/2020    Influenza, High Dose (Fluzone 65 yrs and older) 12/07/2016, 10/24/2017, 10/11/2018, 08/17/2020    Pneumococcal Conjugate 13-valent ([id number]) 11/05/2018    Pneumococcal Polysaccharide ([id number]) 11/03/2020    Tdap (Boostrix, Adacel) 10/12/2020       FAMILY HISTORY     Patient's family history includes Breast Cancer in her sister; Cancer in her mother; Diabetes in her mother; High Blood Pressure in her brother and mother; No Known Problems in her brother, brother, and father. SOCIAL HISTORY     Patient  reports that she has never smoked. She has never used smokeless tobacco. She reports that she does not drink alcohol and does not use drugs.     PHYSICAL EXAM     ED TRIAGE VITALS  BP: 123/69, Temp: 98.2 °F (36.8 °C), Heart Rate: 71, Resp: 18, SpO2: 96 %,Estimated body mass index is 19.4 kg/m² as calculated from the following:    Height as of this encounter: 5' 4\" (1.626 m). Weight as of this encounter: 113 lb (51.3 kg). ,No LMP recorded. Patient is postmenopausal.    Physical Exam  Vitals and nursing note reviewed. Constitutional:       General: She is not in acute distress. Appearance: She is well-developed. She is not diaphoretic. HENT:      Right Ear: A middle ear effusion is present. Left Ear: A middle ear effusion is present. Mouth/Throat:      Mouth: Mucous membranes are moist.      Pharynx: Oropharynx is clear. Eyes:      Conjunctiva/sclera:      Right eye: Right conjunctiva is not injected. Left eye: Left conjunctiva is not injected. Pupils: Pupils are equal.   Cardiovascular:      Rate and Rhythm: Normal rate and regular rhythm. Heart sounds: No murmur heard. Pulmonary:      Effort: Pulmonary effort is normal. No respiratory distress. Breath sounds: Normal breath sounds. Musculoskeletal:      Cervical back: Normal range of motion. Lymphadenopathy:      Head:      Right side of head: No tonsillar adenopathy. Left side of head: No tonsillar adenopathy. Cervical: No cervical adenopathy. Skin:     General: Skin is warm. Findings: No rash. Neurological:      Mental Status: She is alert and oriented to person, place, and time.    Psychiatric:         Behavior: Behavior normal.       DIAGNOSTIC RESULTS     Labs:  Results for orders placed or performed during the hospital encounter of 11/13/22   COVID-19, Rapid   Result Value Ref Range    SARS-CoV-2, MARTA NOT  DETECTED NOT DETECTED   Rapid influenza A/B antigens   Result Value Ref Range    Flu A Antigen Negative NEGATIVE    Flu B Antigen Negative NEGATIVE       IMAGING:    No orders to display         EKG:      URGENT CARE COURSE:     Vitals:    11/13/22 1152   BP: 123/69   Pulse: 71   Resp: 18   Temp: 98.2 °F (36.8 °C)   TempSrc: Oral   SpO2: 96% Weight: 113 lb (51.3 kg)   Height: 5' 4\" (1.626 m)       Medications - No data to display         PROCEDURES:  None    FINAL IMPRESSION      1. Acute bronchitis, unspecified organism          DISPOSITION/ PLAN       COVID influenza are negative at this time. I discussed with the patient we will plan to treat with oral steroids as well as an albuterol inhaler and Tessalon Perles. She is advised to rest and hydrate and follow-up on an outpatient basis symptoms do not improve. She is agreeable to plan as discussed.     PATIENT REFERRED TO:  MATTHEW Hong CNP  [facility] [address] [facility] [address] / [place] [address] [facility] [address]      DISCHARGE MEDICATIONS:  New Prescriptions    ALBUTEROL SULFATE HFA (PROVENTIL;VENTOLIN;PROAIR) 108 (90 BASE) MCG/ACT INHALER    Inhale 2 puffs into the lungs every 4 hours as needed for Wheezing or Shortness of Breath    BENZONATATE (TESSALON) 200 MG CAPSULE    Take 1 capsule by mouth 3 times daily as needed for Cough    PREDNISONE (DELTASONE) 20 MG TABLET    Take 2 tablets by mouth daily for 5 days       Discontinued Medications    No medications on file       Current Discharge Medication List          MATTHEW Juárez CNP    (Please note that portions of this note were completed with a voice recognition program. Efforts were made to edit the dictations but occasionally words are mis-transcribed.)          MATTHEW Juárez CNP  11/13/22 8034

## 2022-11-28 ENCOUNTER — OFFICE VISIT (OUTPATIENT)
Dept: FAMILY MEDICINE CLINIC | Age: 77
End: 2022-11-28
Payer: MEDICARE

## 2022-11-28 VITALS
HEART RATE: 72 BPM | BODY MASS INDEX: 20.6 KG/M2 | SYSTOLIC BLOOD PRESSURE: 120 MMHG | RESPIRATION RATE: 16 BRPM | DIASTOLIC BLOOD PRESSURE: 68 MMHG | TEMPERATURE: 98.6 F | WEIGHT: 120 LBS

## 2022-11-28 DIAGNOSIS — B02.9 HERPES ZOSTER WITHOUT COMPLICATION: ICD-10-CM

## 2022-11-28 DIAGNOSIS — J20.9 BRONCHITIS WITH BRONCHOSPASM: Primary | ICD-10-CM

## 2022-11-28 PROCEDURE — G8420 CALC BMI NORM PARAMETERS: HCPCS | Performed by: NURSE PRACTITIONER

## 2022-11-28 PROCEDURE — 1090F PRES/ABSN URINE INCON ASSESS: CPT | Performed by: NURSE PRACTITIONER

## 2022-11-28 PROCEDURE — 99213 OFFICE O/P EST LOW 20 MIN: CPT | Performed by: NURSE PRACTITIONER

## 2022-11-28 PROCEDURE — G8400 PT W/DXA NO RESULTS DOC: HCPCS | Performed by: NURSE PRACTITIONER

## 2022-11-28 PROCEDURE — 1123F ACP DISCUSS/DSCN MKR DOCD: CPT | Performed by: NURSE PRACTITIONER

## 2022-11-28 PROCEDURE — G8484 FLU IMMUNIZE NO ADMIN: HCPCS | Performed by: NURSE PRACTITIONER

## 2022-11-28 PROCEDURE — 1036F TOBACCO NON-USER: CPT | Performed by: NURSE PRACTITIONER

## 2022-11-28 PROCEDURE — G8427 DOCREV CUR MEDS BY ELIG CLIN: HCPCS | Performed by: NURSE PRACTITIONER

## 2022-11-28 RX ORDER — DEXTROMETHORPHAN HYDROBROMIDE AND PROMETHAZINE HYDROCHLORIDE 15; 6.25 MG/5ML; MG/5ML
5 SYRUP ORAL 4 TIMES DAILY PRN
Qty: 118 ML | Refills: 0 | Status: SHIPPED | OUTPATIENT
Start: 2022-11-28 | End: 2022-12-05

## 2022-11-28 RX ORDER — AZITHROMYCIN 250 MG/1
250 TABLET, FILM COATED ORAL SEE ADMIN INSTRUCTIONS
Qty: 6 TABLET | Refills: 0 | Status: CANCELLED | OUTPATIENT
Start: 2022-11-28 | End: 2022-12-03

## 2022-11-28 RX ORDER — ALBUTEROL SULFATE 90 UG/1
2 AEROSOL, METERED RESPIRATORY (INHALATION) 4 TIMES DAILY PRN
Qty: 54 G | Refills: 1 | Status: SHIPPED | OUTPATIENT
Start: 2022-11-28

## 2022-11-28 RX ORDER — DOXYCYCLINE HYCLATE 100 MG
100 TABLET ORAL 2 TIMES DAILY
Qty: 20 TABLET | Refills: 0 | Status: SHIPPED | OUTPATIENT
Start: 2022-11-28 | End: 2022-12-08

## 2022-11-28 ASSESSMENT — ENCOUNTER SYMPTOMS: COUGH: 1

## 2022-11-28 NOTE — PROGRESS NOTES
[address] [facility] [address]  Dept: [phone]  Dept Fax: [phone]     Visit Date:  11/28/2022      Patient:  Cintia Zambrano  YOB: 1945    HPI:     Chief Complaint   Patient presents with    Cough     Cough, chest congestion x couple weeks. Pt presents to the office today for cough for over a month. Pt has been on Z-Pack and steroids with some relief. Pt was also DX with shingles about 2 weeks ago and given antivirals. Shingles rash has improved, but pain is still there. Using lidociane with some relief. No fever or chills. Cough  This is a new problem. The current episode started 1 to 4 weeks ago. The problem has been gradually worsening. The cough is Productive of sputum. Associated symptoms include nasal congestion, postnasal drip, a sore throat and wheezing. Pertinent negatives include no chills, ear congestion, ear pain, fever, headaches, hemoptysis, myalgias, rash, shortness of breath, sweats or weight loss. She has tried rest, OTC cough suppressant, prescription cough suppressant, a beta-agonist inhaler, body position changes and cool air for the symptoms. The treatment provided mild relief. There is no history of asthma, bronchiectasis, bronchitis, emphysema, environmental allergies or pneumonia.      Medications    Current Outpatient Medications:     doxycycline hyclate (VIBRA-TABS) 100 MG tablet, Take 1 tablet by mouth 2 times daily for 10 days, Disp: 20 tablet, Rfl: 0    albuterol sulfate HFA (VENTOLIN HFA) 108 (90 Base) MCG/ACT inhaler, Inhale 2 puffs into the lungs 4 times daily as needed for Wheezing, Disp: 54 g, Rfl: 1    promethazine-dextromethorphan (PROMETHAZINE-DM) 6.25-15 MG/5ML syrup, Take 5 mLs by mouth 4 times daily as needed for Cough, Disp: 118 mL, Rfl: 0    albuterol sulfate HFA (PROVENTIL;VENTOLIN;PROAIR) 108 (90 Base) MCG/ACT inhaler, Inhale 2 puffs into the lungs every 4 hours as needed for Wheezing or Shortness of Breath, Disp: 18 g, Rfl: 0    Red Yeast Rice 600 MG CAPS, Take 1 capsule by mouth Daily with supper, Disp: , Rfl:     pseudoephedrine (SUDAFED) 30 MG tablet, Take 30 mg by mouth every 4 hours as needed for Congestion, Disp: , Rfl:     fluticasone (FLONASE) 50 MCG/ACT nasal spray, 1 spray by Each Nostril route daily, Disp: 16 g, Rfl: 0    NONFORMULARY, Take by mouth multidophilus, Disp: , Rfl:     NONFORMULARY, Take 1 capsule by mouth 2 times daily (with meals) BREAKFAST AND LUNCH -[facility] phosphatidyl serine + bacopa monnieri, Disp: , Rfl:     Coenzyme Q10-Red Yeast Rice  MG CAPS, Take 1 capsule by mouth 2 times daily (before meals) Supper and bedtime, Disp: , Rfl:     letrozole (FEMARA) 2.5 MG tablet, Take 1 tablet by mouth daily, Disp: , Rfl: 3    NONFORMULARY, Take 1 capsule by mouth 3 times daily [employer] by Source Natural, Disp: , Rfl:     B Complex-Folic Acid (D-970 BALANCED TR PO), Take 1 tablet by mouth 3 times daily (before meals) [address] [facility] [employer] , Disp: , Rfl:     Potassium 99 MG TABS, Take 1 capsule by mouth every other day , Disp: , Rfl:     diphenhydrAMINE-PE-APAP 25-5-325 MG TABS, Take by mouth as needed, Disp: , Rfl:     Cinnamon 500 MG CAPS, Take 500 mg by mouth 3 times daily , Disp: , Rfl:     Ginger, Zingiber officinalis, (GINGER ROOT) 500 MG CAPS, Take 1 capsule by mouth daily, Disp: , Rfl:     Turmeric Curcumin 500 MG CAPS, Take 3 capsules by mouth in the morning, at noon, and at bedtime, Disp: , Rfl:     melatonin 3 MG TABS tablet, Take 5 mg by mouth nightly as needed, Disp: , Rfl:     Garlic 683 MG CAPS, Take 1 capsule by mouth 2 times daily, Disp: , Rfl:     Calcium Carbonate-Vit D-Min (CALCIUM 1200 PO), Take 1 tablet by mouth daily (before lunch) , Disp: , Rfl:     Omega-3 Fatty Acids (FISH OIL) 1200 MG CAPS, Take 3 capsules by mouth 4 times daily (before meals and nightly) [employer] pharmaceutical grade, Disp: , Rfl:     vitamin E 400 UNIT capsule, Take 400 Units by mouth daily, Disp: , Rfl:     vitamin B-6 (PYRIDOXINE) 50 MG tablet, Take 50 mg by mouth daily , Disp: , Rfl:     Cyanocobalamin (VITAMIN B-12) 3000 MCG SUBL, Place 2 tablets under the tongue daily , Disp: , Rfl:     Biotin 2500 MCG CAPS, Take 1 tablet by mouth every other day SKIP 4 DAYS BEFORE LAB TEST, Disp: , Rfl:     NONFORMULARY, Take 1 capsule by mouth daily as needed Orega Resp usually used in October, Disp: , Rfl:     NONFORMULARY, Take 1 capsule by mouth 2 times daily as needed Respiration Blend SP-3 usually used in October, Disp: , Rfl:     Olive Leaf Extract 250 MG CAPS, Take 1 capsule by mouth 2 times daily, Disp: , Rfl:     Cetirizine HCl 10 MG CAPS, Take by mouth nightly , Disp: , Rfl:     loratadine (CLARITIN) 10 MG capsule, Take 10 mg by mouth as needed , Disp: , Rfl:     zoster recombinant adjuvanted vaccine [facility]) 50 MCG/0.5ML SUSR injection, Inject 0.5 mLs into the muscle See Admin Instructions 1 dose now and repeat in 2-6 months (Patient not taking: Reported on 11/28/2022), Disp: 0.5 mL, Rfl: 0    vitamin D (CHOLECALCIFEROL) 5000 units CAPS capsule, Take 5,000 Units by mouth once a week STOP TIL MARCH THEN WEEKLY (Patient not taking: No sig reported), Disp: , Rfl:     The patient is allergic to gluten meal, orange oil, and pcn [penicillins]. Past Medical History  Arron Kiran  has a past medical history of Cancer ([facility] [address] Subjective:      Review of Systems   Constitutional:  Negative for chills, fever and weight loss. HENT:  Positive for postnasal drip and sore throat. Negative for ear pain. Respiratory:  Positive for cough and wheezing. Negative for hemoptysis and shortness of breath. Musculoskeletal:  Negative for myalgias. Skin:  Negative for rash. Allergic/Immunologic: Negative for environmental allergies. Neurological:  Negative for headaches.      Objective:     /68 (Site: Right Upper Arm)   Pulse 72   Temp 98.6 °F (37 °C) (Oral)   Resp 16   Wt 120 lb (54.4 kg)   BMI 20.60 kg/m²     Physical Exam  Vitals reviewed. Constitutional:       General: She is not in acute distress. Appearance: Normal appearance. She is well-developed. HENT:      Head: Normocephalic and atraumatic. Right Ear: Hearing, tympanic membrane, ear canal and external ear normal.      Left Ear: Hearing, tympanic membrane, ear canal and external ear normal.      Nose: Congestion and rhinorrhea present. No nasal tenderness. Mouth/Throat:      Lips: Pink. Mouth: Mucous membranes are moist. No oral lesions. Pharynx: Oropharynx is clear. Uvula midline. Posterior oropharyngeal erythema present. Eyes:      General:         Right eye: No discharge. Left eye: No discharge. Conjunctiva/sclera: Conjunctivae normal.   Neck:      Vascular: No carotid bruit. Trachea: No tracheal deviation. Cardiovascular:      Rate and Rhythm: Normal rate and regular rhythm. Heart sounds: Normal heart sounds. No murmur heard. Pulmonary:      Effort: Pulmonary effort is normal. No respiratory distress. Breath sounds: Normal breath sounds. Comments: Harsh, congested cough. Abdominal:      General: Bowel sounds are normal.      Palpations: Abdomen is soft. Tenderness: There is no abdominal tenderness. Musculoskeletal:         General: Normal range of motion. Cervical back: Full passive range of motion without pain and neck supple. Lymphadenopathy:      Head:      Right side of head: No submental, submandibular, tonsillar, preauricular, posterior auricular or occipital adenopathy. Left side of head: No submental, submandibular, tonsillar, preauricular, posterior auricular or occipital adenopathy. Cervical: No cervical adenopathy. Skin:     General: Skin is warm and dry. Findings: No rash. Neurological:      General: No focal deficit present.       Mental Status: She is alert and oriented to

## 2022-11-29 ASSESSMENT — ENCOUNTER SYMPTOMS
SHORTNESS OF BREATH: 0
WHEEZING: 1
SORE THROAT: 1
HEMOPTYSIS: 0

## 2022-12-01 ENCOUNTER — TELEPHONE (OUTPATIENT)
Dept: FAMILY MEDICINE CLINIC | Age: 77
End: 2022-12-01

## 2022-12-01 RX ORDER — ALBUTEROL SULFATE 90 UG/1
2 AEROSOL, METERED RESPIRATORY (INHALATION) EVERY 4 HOURS PRN
Qty: 18 G | Refills: 0 | Status: SHIPPED | OUTPATIENT
Start: 2022-12-01

## 2022-12-01 NOTE — TELEPHONE ENCOUNTER
Pt was seen on 11/28/2022 by FREDDY. Was prescribed ventolin and she went to pick it up at her pharmacy it was over $100. She would like a different inhaler sent to [facility]. I told her that I could not promise that any other medication could be cheaper. Please advise.

## 2022-12-01 NOTE — TELEPHONE ENCOUNTER
Noted. Resent albuterol for a proair, we can see if that is any cheaper. Pt may also ask the pharmacist what would be cheaper.  Thanks -WS    Orders Placed This Encounter   Medications    albuterol sulfate HFA (PROVENTIL;VENTOLIN;PROAIR) 108 (90 Base) MCG/ACT inhaler     Sig: Inhale 2 puffs into the lungs every 4 hours as needed for Wheezing or Shortness of Breath     Dispense:  18 g     Refill:  0

## 2022-12-01 NOTE — TELEPHONE ENCOUNTER
Pharmacy states that new rx will have a copay of $44.99. I spoke with patient and she was agreeable to this.

## 2022-12-06 ENCOUNTER — OFFICE VISIT (OUTPATIENT)
Dept: FAMILY MEDICINE CLINIC | Age: 77
End: 2022-12-06
Payer: MEDICARE

## 2022-12-06 VITALS
DIASTOLIC BLOOD PRESSURE: 58 MMHG | TEMPERATURE: 98 F | HEART RATE: 92 BPM | WEIGHT: 125 LBS | RESPIRATION RATE: 14 BRPM | SYSTOLIC BLOOD PRESSURE: 130 MMHG | BODY MASS INDEX: 21.46 KG/M2

## 2022-12-06 DIAGNOSIS — J06.9 VIRAL URI: ICD-10-CM

## 2022-12-06 DIAGNOSIS — R06.00 PND (PAROXYSMAL NOCTURNAL DYSPNEA): ICD-10-CM

## 2022-12-06 DIAGNOSIS — R05.2 SUBACUTE COUGH: Primary | ICD-10-CM

## 2022-12-06 PROCEDURE — G8420 CALC BMI NORM PARAMETERS: HCPCS | Performed by: NURSE PRACTITIONER

## 2022-12-06 PROCEDURE — 1036F TOBACCO NON-USER: CPT | Performed by: NURSE PRACTITIONER

## 2022-12-06 PROCEDURE — G8427 DOCREV CUR MEDS BY ELIG CLIN: HCPCS | Performed by: NURSE PRACTITIONER

## 2022-12-06 PROCEDURE — 99213 OFFICE O/P EST LOW 20 MIN: CPT | Performed by: NURSE PRACTITIONER

## 2022-12-06 PROCEDURE — 1123F ACP DISCUSS/DSCN MKR DOCD: CPT | Performed by: NURSE PRACTITIONER

## 2022-12-06 PROCEDURE — G8484 FLU IMMUNIZE NO ADMIN: HCPCS | Performed by: NURSE PRACTITIONER

## 2022-12-06 PROCEDURE — 1090F PRES/ABSN URINE INCON ASSESS: CPT | Performed by: NURSE PRACTITIONER

## 2022-12-06 PROCEDURE — G8400 PT W/DXA NO RESULTS DOC: HCPCS | Performed by: NURSE PRACTITIONER

## 2022-12-06 RX ORDER — METHYLPREDNISOLONE 4 MG/1
TABLET ORAL
Qty: 1 KIT | Refills: 0 | Status: SHIPPED | OUTPATIENT
Start: 2022-12-06 | End: 2022-12-12

## 2022-12-06 RX ORDER — FLUTICASONE PROPIONATE 50 MCG
1 SPRAY, SUSPENSION (ML) NASAL DAILY
Qty: 32 G | Refills: 0 | Status: SHIPPED | OUTPATIENT
Start: 2022-12-06

## 2022-12-06 ASSESSMENT — ENCOUNTER SYMPTOMS
BLOOD IN STOOL: 0
CONSTIPATION: 0
SORE THROAT: 0
SINUS PRESSURE: 1
SINUS PAIN: 1
EYE DISCHARGE: 0
DIARRHEA: 0
COLOR CHANGE: 0
SHORTNESS OF BREATH: 0
NAUSEA: 0
EYE REDNESS: 0
ABDOMINAL PAIN: 0
RHINORRHEA: 1
ANAL BLEEDING: 0
COUGH: 1
ABDOMINAL DISTENTION: 0

## 2022-12-06 NOTE — PROGRESS NOTES
[facility]  [address] [facility] [address]  Dept: [phone]  Loc: [phone]      Visit Date: 12/6/2022    Ignacia Gonzales a 69 y.o. female who presents today for:   Chief Complaint   Patient presents with    Dizziness     Dizziness started this morning. Not feeling well,      HPI:     Was seen at [facility] for cough and congestion 10/14 - sx started 6 days prior - dx with viral illness - no meds given    10/18 seen in office - ongoing c/o cough, chest tightness, wheezing, and dizziness - feeling like she was veering to the side - given zithromax    Seen back 10/26 - dx shingles    Back to  11/13 for URI sy - flu and covid negative at that time - given albuterol, tessalon and prednisone - felt a little better with the steroids    Back in office 11/28 - c/o ongoing uri symptoms - worsening - given doxycycline, albuterol, and promethazine-dm. Presents today with continued symptoms - congestion - sinus pressure - dizziness - PND. No fevers. Been using albuterol inhaler    HPI  Health Maintenance   Topic Date Due    Shingles vaccine (1 of 2) Never done    COVID-19 Vaccine (4 - Booster for Moderna series) 01/15/2022    Depression Screen  09/15/2023    Annual Wellness Visit (AWV)  09/16/2023    DTaP/Tdap/Td vaccine (2 - Td or Tdap) 10/12/2030    DEXA (modify frequency per FRAX score)  Completed    Flu vaccine  Completed    Pneumococcal 65+ years Vaccine  Completed    Hepatitis C screen  Completed    Hepatitis A vaccine  Aged Out    Hib vaccine  Aged Out    Meningococcal (ACWY) vaccine  Aged Out       Current Outpatient Medications   Medication Sig Dispense Refill    methylPREDNISolone (MEDROL DOSEPACK) 4 MG tablet Take by mouth.  1 kit 0    fluticasone (FLONASE) 50 MCG/ACT nasal spray 1 spray by Each Nostril route daily For 7 days 32 g 0    albuterol sulfate HFA (PROVENTIL;VENTOLIN;PROAIR) 108 (90 Base) MCG/ACT inhaler Inhale 2 puffs into the lungs every 4 hours as needed for Wheezing or Shortness of Breath 18 g 0    doxycycline hyclate (VIBRA-TABS) 100 MG tablet Take 1 tablet by mouth 2 times daily for 10 days 20 tablet 0    albuterol sulfate HFA (VENTOLIN HFA) 108 (90 Base) MCG/ACT inhaler Inhale 2 puffs into the lungs 4 times daily as needed for Wheezing 54 g 1    Red Yeast Rice 600 MG CAPS Take 1 capsule by mouth Daily with supper      zoster recombinant adjuvanted vaccine (SHINGRIX) 50 MCG/0.5ML SUSR injection Inject 0.5 mLs into the muscle See Admin Instructions 1 dose now and repeat in 2-6 months 0.5 mL 0    pseudoephedrine (SUDAFED) 30 MG tablet Take 30 mg by mouth every 4 hours as needed for Congestion      NONFORMULARY Take by mouth multidophilus      NONFORMULARY Take 1 capsule by mouth 2 times daily (with meals) BREAKFAST AND LUNCH hipix at [facility] phosphatidyl serine + bacopa monnieri      Coenzyme Q10-Red Yeast Rice  MG CAPS Take 1 capsule by mouth 2 times daily (before meals) Supper and bedtime      letrozole (FEMARA) 2.5 MG tablet Take 1 tablet by mouth daily  3    NONFORMULARY Take 1 capsule by mouth 3 times daily [employer] by Source Natural      B Complex-Folic Acid (S-065 BALANCED TR PO) Take 1 tablet by mouth 3 times daily (before meals) [id number] [facility]       Potassium 99 MG TABS Take 1 capsule by mouth every other day       diphenhydrAMINE-PE-APAP 25-5-325 MG TABS Take by mouth as needed      vitamin D (CHOLECALCIFEROL) 5000 units CAPS capsule Take 5,000 Units by mouth once a week STOP TIL MARCH THEN WEEKLY      Cinnamon 500 MG CAPS Take 500 mg by mouth 3 times daily       Ginger, Zingiber officinalis, (GINGER ROOT) 500 MG CAPS Take 1 capsule by mouth daily      Turmeric Curcumin 500 MG CAPS Take 3 capsules by mouth in the morning, at noon, and at bedtime      melatonin 3 MG TABS tablet Take 5 mg by mouth nightly as needed      Garlic 903 MG CAPS Take 1 capsule by mouth 2 times daily      Calcium Carbonate-Vit D-Min (CALCIUM 1200 PO) Take 1 tablet by mouth daily (before lunch)       Omega-3 Fatty Acids (FISH OIL) 1200 MG CAPS Take 3 capsules by mouth 4 times daily (before meals and nightly) [facility] pharmaceutical grade      vitamin E 400 UNIT capsule Take 400 Units by mouth daily      vitamin B-6 (PYRIDOXINE) 50 MG tablet Take 50 mg by mouth daily       Cyanocobalamin (VITAMIN B-12) 3000 MCG SUBL Place 2 tablets under the tongue daily       Biotin 2500 MCG CAPS Take 1 tablet by mouth every other day SKIP 4 DAYS BEFORE LAB TEST      NONFORMULARY Take 1 capsule by mouth daily as needed Orega Resp usually used in October      NONFORMULARY Take 1 capsule by mouth 2 times daily as needed Respiration Blend SP-3 usually used in October      Olive Leaf Extract 250 MG CAPS Take 1 capsule by mouth 2 times daily      Cetirizine HCl 10 MG CAPS Take by mouth nightly       loratadine (CLARITIN) 10 MG capsule Take 10 mg by mouth as needed        No current facility-administered medications for this visit. Allergies   Allergen Reactions    Gluten Meal Other (See Comments)     Gluten,fruit, and sugar causes blisters of mouth    Orange Oil Other (See Comments)     Any acidic fruit    Pcn [Penicillins] Other (See Comments)     Unknown  Childhood reaction. Subjective:   Review of Systems   Constitutional:  Negative for chills, fatigue and fever. HENT:  Positive for congestion, postnasal drip, rhinorrhea, sinus pressure and sinus pain. Negative for ear pain and sore throat. Eyes:  Negative for discharge and redness. Respiratory:  Positive for cough. Negative for shortness of breath. Cardiovascular:  Negative for chest pain and leg swelling. Gastrointestinal:  Negative for abdominal distention, abdominal pain, anal bleeding, blood in stool, constipation, diarrhea and nausea. Skin:  Negative for color change and rash. Neurological:  Negative for facial asymmetry, speech difficulty and weakness.    Hematological:  Does not bruise/bleed easily. Psychiatric/Behavioral:  Negative for agitation and confusion. Objective:     Vitals:    12/06/22 1155 12/06/22 1208   BP: (!) 104/48 (!) 130/58   Pulse: 92    Resp: 14    Temp: 98 °F (36.7 °C)    TempSrc: Oral    Weight: 125 lb (56.7 kg)        Body mass index is 21.46 kg/m². Wt Readings from Last 3 Encounters:   12/06/22 125 lb (56.7 kg)   11/28/22 120 lb (54.4 kg)   11/13/22 113 lb (51.3 kg)     BP Readings from Last 3 Encounters:   12/06/22 (!) 130/58   11/28/22 120/68   11/13/22 123/69       Physical Exam  Constitutional:       General: She is not in acute distress. Appearance: Normal appearance. She is well-developed. She is not ill-appearing or diaphoretic. HENT:      Head: Normocephalic and atraumatic. Right Ear: Hearing and external ear normal. No decreased hearing noted. A middle ear effusion is present. Left Ear: Hearing and external ear normal. No decreased hearing noted. A middle ear effusion is present. Nose: Nose normal. No nasal deformity. Mouth/Throat:     Eyes:      General:         Right eye: No discharge. Left eye: No discharge. Conjunctiva/sclera: Conjunctivae normal.   Cardiovascular:      Rate and Rhythm: Normal rate and regular rhythm. Pulmonary:      Effort: Pulmonary effort is normal. No respiratory distress. Breath sounds: No wheezing or rhonchi. Abdominal:      General: There is no distension. Tenderness: There is no guarding. Musculoskeletal:         General: No tenderness or deformity. Normal range of motion. Cervical back: Normal range of motion and neck supple. Skin:     Coloration: Skin is not pale. Findings: No erythema or rash (On exposed areas). Neurological:      General: No focal deficit present. Mental Status: She is alert and oriented to person, place, and time.       Gait: Gait normal.   Psychiatric:         Mood and Affect: Mood normal.         Speech: Speech normal. Behavior: Behavior normal.         Thought Content: Thought content normal.         Judgment: Judgment normal.       Lab Results   Component Value Date    WBC 5.0 2022    HGB 13.6 2022    HCT 40.8 2022     2022    CHOL 266 (H) 2022    TRIG 78 2022    HDL 99 2022    LDLCALC 151 2022    AST 19 2022     2022    K 4.4 2022     2022    CREATININE 0.9 2022    BUN 29 (H) 2022    CO2 24 2022    TSH 1.680 2021    LABA1C 5.5 2022    LABGLOM 61 (A) 2022    MG 2.2 2022    CALCIUM 9.5 2022    VITD25 96 2022       :       Diagnosis Orders   1. Subacute cough  XR CHEST STANDARD (2 VW)    methylPREDNISolone (MEDROL DOSEPACK) 4 MG tablet    fluticasone (FLONASE) 50 MCG/ACT nasal spray      2. Viral URI  methylPREDNISolone (MEDROL DOSEPACK) 4 MG tablet    fluticasone (FLONASE) 50 MCG/ACT nasal spray      3. PND (paroxysmal nocturnal dyspnea)  methylPREDNISolone (MEDROL DOSEPACK) 4 MG tablet    fluticasone (FLONASE) 50 MCG/ACT nasal spray          :   Will get a chest xray  Steroids given   Flonase for up to 7 days      Return if symptoms worsen or fail to improve. Placed:  Orders Placed This Encounter   Procedures    XR CHEST STANDARD (2 VW)     Medications Prescribed:  Orders Placed This Encounter   Medications    methylPREDNISolone (MEDROL DOSEPACK) 4 MG tablet     Sig: Take by mouth. Dispense:  1 kit     Refill:  0    fluticasone (FLONASE) 50 MCG/ACT nasal spray     Si spray by Each Nostril route daily For 7 days     Dispense:  32 g     Refill:  0          Discussed use,benefit, and side effects of prescribed medications. Barriers to medication complianceaddressed. All patient questions answered. Pt voiced understanding.      Electronicallysigned by MATTHEW Mccarthy CNP on 2022 at 12:58 PM

## 2022-12-08 ENCOUNTER — HOSPITAL ENCOUNTER (OUTPATIENT)
Age: 77
Discharge: HOME | End: 2022-12-08
Payer: MEDICARE

## 2022-12-08 ENCOUNTER — HOSPITAL ENCOUNTER (OUTPATIENT)
Dept: GENERAL RADIOLOGY | Age: 77
Discharge: HOME | End: 2022-12-08
Payer: MEDICARE

## 2022-12-08 DIAGNOSIS — R05.2 SUBACUTE COUGH: ICD-10-CM

## 2022-12-08 PROCEDURE — 71046 X-RAY EXAM CHEST 2 VIEWS: CPT

## 2022-12-28 DIAGNOSIS — R05.2 SUBACUTE COUGH: ICD-10-CM

## 2022-12-28 DIAGNOSIS — J06.9 VIRAL URI: ICD-10-CM

## 2022-12-28 DIAGNOSIS — R06.00 PND (PAROXYSMAL NOCTURNAL DYSPNEA): ICD-10-CM

## 2022-12-28 RX ORDER — FLUTICASONE PROPIONATE 50 MCG
1 SPRAY, SUSPENSION (ML) NASAL DAILY
Refills: 1 | OUTPATIENT
Start: 2022-12-28

## 2023-01-04 ENCOUNTER — OFFICE VISIT (OUTPATIENT)
Dept: FAMILY MEDICINE CLINIC | Age: 78
End: 2023-01-04
Payer: MEDICARE

## 2023-01-04 VITALS
SYSTOLIC BLOOD PRESSURE: 100 MMHG | HEART RATE: 80 BPM | BODY MASS INDEX: 20.77 KG/M2 | WEIGHT: 121 LBS | DIASTOLIC BLOOD PRESSURE: 64 MMHG | RESPIRATION RATE: 16 BRPM | TEMPERATURE: 98.6 F

## 2023-01-04 DIAGNOSIS — D22.30 CHANGE IN FACIAL MOLE: ICD-10-CM

## 2023-01-04 DIAGNOSIS — J40 BRONCHITIS WITH ACUTE WHEEZING: Primary | ICD-10-CM

## 2023-01-04 PROCEDURE — 1036F TOBACCO NON-USER: CPT | Performed by: NURSE PRACTITIONER

## 2023-01-04 PROCEDURE — G8400 PT W/DXA NO RESULTS DOC: HCPCS | Performed by: NURSE PRACTITIONER

## 2023-01-04 PROCEDURE — 1090F PRES/ABSN URINE INCON ASSESS: CPT | Performed by: NURSE PRACTITIONER

## 2023-01-04 PROCEDURE — 99213 OFFICE O/P EST LOW 20 MIN: CPT | Performed by: NURSE PRACTITIONER

## 2023-01-04 PROCEDURE — G8420 CALC BMI NORM PARAMETERS: HCPCS | Performed by: NURSE PRACTITIONER

## 2023-01-04 PROCEDURE — G8427 DOCREV CUR MEDS BY ELIG CLIN: HCPCS | Performed by: NURSE PRACTITIONER

## 2023-01-04 PROCEDURE — G8484 FLU IMMUNIZE NO ADMIN: HCPCS | Performed by: NURSE PRACTITIONER

## 2023-01-04 PROCEDURE — 1123F ACP DISCUSS/DSCN MKR DOCD: CPT | Performed by: NURSE PRACTITIONER

## 2023-01-04 RX ORDER — BENZONATATE 100 MG/1
100 CAPSULE ORAL 3 TIMES DAILY PRN
Qty: 30 CAPSULE | Refills: 0 | Status: SHIPPED | OUTPATIENT
Start: 2023-01-04 | End: 2023-01-11

## 2023-01-04 RX ORDER — PREDNISONE 20 MG/1
20 TABLET ORAL 2 TIMES DAILY
Qty: 10 TABLET | Refills: 0 | Status: SHIPPED | OUTPATIENT
Start: 2023-01-04 | End: 2023-01-09

## 2023-01-04 RX ORDER — AZITHROMYCIN 250 MG/1
250 TABLET, FILM COATED ORAL SEE ADMIN INSTRUCTIONS
Qty: 6 TABLET | Refills: 0 | Status: SHIPPED | OUTPATIENT
Start: 2023-01-04 | End: 2023-01-09

## 2023-01-04 ASSESSMENT — ENCOUNTER SYMPTOMS
HEMOPTYSIS: 0
WHEEZING: 0
COUGH: 1
SHORTNESS OF BREATH: 0
RHINORRHEA: 1
HEARTBURN: 0

## 2023-01-04 ASSESSMENT — PATIENT HEALTH QUESTIONNAIRE - PHQ9
SUM OF ALL RESPONSES TO PHQ QUESTIONS 1-9: 0
1. LITTLE INTEREST OR PLEASURE IN DOING THINGS: 0
SUM OF ALL RESPONSES TO PHQ QUESTIONS 1-9: 0
2. FEELING DOWN, DEPRESSED OR HOPELESS: 0
SUM OF ALL RESPONSES TO PHQ QUESTIONS 1-9: 0
SUM OF ALL RESPONSES TO PHQ9 QUESTIONS 1 & 2: 0
SUM OF ALL RESPONSES TO PHQ QUESTIONS 1-9: 0

## 2023-01-04 NOTE — PROGRESS NOTES
[address] [facility] [address]  Dept: [phone]  Dept Fax: [phone]: [phone]     Visit Date:  1/4/2023      Patient:  Jennie Gutiérrez  YOB: 1945    HPI:     Chief Complaint   Patient presents with    Cough     Cough on and off x 2 months. Coughing up thick green mucus. Other     Patient wants right ear looked at and temple area on right side looked at. Pt presents to the office today for ongoing issues with cough and congestion. She has been dealing with this on and off for a few months. She has been on antibiotics, but over the past few weeks the cough has been getting worse. Coughing up green mucous. No recent fevers. Cough  This is a new problem. The current episode started more than 1 month ago. The problem has been gradually worsening. The cough is Productive of sputum. Associated symptoms include nasal congestion, postnasal drip and rhinorrhea. Pertinent negatives include no ear congestion, ear pain, heartburn, hemoptysis, shortness of breath, sweats, weight loss or wheezing. The symptoms are aggravated by lying down. She has tried rest, cool air, body position changes and OTC cough suppressant for the symptoms. Lesion on her right temple. No bleeding or pain. Pt does have a follow up with dermatology schedule.       Medications    Current Outpatient Medications:     benzonatate (TESSALON) 100 MG capsule, Take 1 capsule by mouth 3 times daily as needed for Cough, Disp: 30 capsule, Rfl: 0    predniSONE (DELTASONE) 20 MG tablet, Take 1 tablet by mouth 2 times daily for 5 days, Disp: 10 tablet, Rfl: 0    azithromycin (ZITHROMAX) 250 MG tablet, Take 1 tablet by mouth See Admin Instructions for 5 days 500mg on day 1 followed by 250mg on days 2 - 5, Disp: 6 tablet, Rfl: 0    fluticasone (FLONASE) 50 MCG/ACT nasal spray, 1 spray by Each Nostril route daily For 7 days, Disp: 32 g, Rfl: 0 albuterol sulfate HFA (PROVENTIL;VENTOLIN;PROAIR) 108 (90 Base) MCG/ACT inhaler, Inhale 2 puffs into the lungs every 4 hours as needed for Wheezing or Shortness of Breath, Disp: 18 g, Rfl: 0    albuterol sulfate HFA (VENTOLIN HFA) 108 (90 Base) MCG/ACT inhaler, Inhale 2 puffs into the lungs 4 times daily as needed for Wheezing, Disp: 54 g, Rfl: 1    Red Yeast Rice 600 MG CAPS, Take 1 capsule by mouth Daily with supper, Disp: , Rfl:     zoster recombinant adjuvanted vaccine (SHINGRIX) 50 MCG/0.5ML SUSR injection, Inject 0.5 mLs into the muscle See Admin Instructions 1 dose now and repeat in 2-6 months, Disp: 0.5 mL, Rfl: 0    pseudoephedrine (SUDAFED) 30 MG tablet, Take 30 mg by mouth every 4 hours as needed for Congestion, Disp: , Rfl:     NONFORMULARY, Take by mouth multidophilus, Disp: , Rfl:     NONFORMULARY, Take 1 capsule by mouth 2 times daily (with meals) BREAKFAST AND LUNCH [facility] phosphatidyl serine + bacopa monnieri, Disp: , Rfl:     Coenzyme Q10-Red Yeast Rice  MG CAPS, Take 1 capsule by mouth 2 times daily (before meals) Supper and bedtime, Disp: , Rfl:     letrozole (FEMARA) 2.5 MG tablet, Take 1 tablet by mouth daily, Disp: , Rfl: 3    NONFORMULARY, Take 1 capsule by mouth 3 times daily [facility] by Source Natural, Disp: , Rfl:     B Complex-Folic Acid (V-541 BALANCED TR PO), Take 1 tablet by mouth 3 times daily (before meals) [id number] [facility] [employer] , Disp: , Rfl:     Potassium 99 MG TABS, Take 1 capsule by mouth every other day , Disp: , Rfl:     diphenhydrAMINE-PE-APAP 25-5-325 MG TABS, Take by mouth as needed, Disp: , Rfl:     vitamin D (CHOLECALCIFEROL) 5000 units CAPS capsule, Take 5,000 Units by mouth once a week STOP TIL MARCH THEN WEEKLY, Disp: , Rfl:     Cinnamon 500 MG CAPS, Take 500 mg by mouth 3 times daily , Disp: , Rfl:     Ginger, Zingiber officinalis, (GINGER ROOT) 500 MG CAPS, Take 1 capsule by mouth daily, Disp: , Rfl:     Turmeric Curcumin 500 MG CAPS, Take 3 capsules by mouth in the morning, at noon, and at bedtime, Disp: , Rfl:     melatonin 3 MG TABS tablet, Take 5 mg by mouth nightly as needed, Disp: , Rfl:     Garlic 030 MG CAPS, Take 1 capsule by mouth 2 times daily, Disp: , Rfl:     Calcium Carbonate-Vit D-Min (CALCIUM 1200 PO), Take 1 tablet by mouth daily (before lunch) , Disp: , Rfl:     Omega-3 Fatty Acids (FISH OIL) 1200 MG CAPS, Take 3 capsules by mouth 4 times daily (before meals and nightly) [employer] pharmaceutical grade, Disp: , Rfl:     vitamin E 400 UNIT capsule, Take 400 Units by mouth daily, Disp: , Rfl:     vitamin B-6 (PYRIDOXINE) 50 MG tablet, Take 50 mg by mouth daily , Disp: , Rfl:     Cyanocobalamin (VITAMIN B-12) 3000 MCG SUBL, Place 2 tablets under the tongue daily , Disp: , Rfl:     Biotin 2500 MCG CAPS, Take 1 tablet by mouth every other day SKIP 4 DAYS BEFORE LAB TEST, Disp: , Rfl:     NONFORMULARY, Take 1 capsule by mouth daily as needed Orega Resp usually used in October, Disp: , Rfl:     NONFORMULARY, Take 1 capsule by mouth 2 times daily as needed Respiration Blend SP-3 usually used in October, Disp: , Rfl:     Olive Leaf Extract 250 MG CAPS, Take 1 capsule by mouth 2 times daily, Disp: , Rfl:     Cetirizine HCl 10 MG CAPS, Take by mouth nightly , Disp: , Rfl:     loratadine (CLARITIN) 10 MG capsule, Take 10 mg by mouth as needed , Disp: , Rfl:     The patient is allergic to gluten meal, orange oil, and pcn [penicillins]. Past Medical History  Rahul Silva  has a past medical history of Cancer ([facility] [address] Subjective:      Review of Systems   Constitutional:  Negative for weight loss. HENT:  Positive for postnasal drip and rhinorrhea. Negative for ear pain. Respiratory:  Positive for cough. Negative for hemoptysis, shortness of breath and wheezing. Gastrointestinal:  Negative for heartburn.      Objective:     /64 (Site: Right Upper Arm)   Pulse 80   Temp 98.6 °F (37 °C) (Oral)   Resp 16   Wt 121 lb (54.9 kg)   BMI 20.77 kg/m² Physical Exam  Vitals reviewed. Constitutional:       General: She is not in acute distress. Appearance: Normal appearance. She is well-developed. HENT:      Head: Normocephalic and atraumatic. Right Ear: Hearing, tympanic membrane, ear canal and external ear normal.      Left Ear: Hearing, tympanic membrane, ear canal and external ear normal.      Nose: Nose normal. No nasal tenderness. Mouth/Throat:      Lips: Pink. Mouth: Mucous membranes are moist. No oral lesions. Pharynx: Oropharynx is clear. Uvula midline. Eyes:      General:         Right eye: No discharge. Left eye: No discharge. Conjunctiva/sclera: Conjunctivae normal.   Neck:      Trachea: No tracheal deviation. Cardiovascular:      Rate and Rhythm: Normal rate and regular rhythm. Heart sounds: Normal heart sounds. No murmur heard. Pulmonary:      Effort: Pulmonary effort is normal. No respiratory distress. Breath sounds: Wheezing present. Comments: Harsh, congested cough. Abdominal:      General: Bowel sounds are normal.      Palpations: Abdomen is soft. Tenderness: There is no abdominal tenderness. Musculoskeletal:      Cervical back: Full passive range of motion without pain and neck supple. Lymphadenopathy:      Head:      Right side of head: No submental, submandibular, tonsillar, preauricular, posterior auricular or occipital adenopathy. Left side of head: No submental, submandibular, tonsillar, preauricular, posterior auricular or occipital adenopathy. Cervical: No cervical adenopathy. Skin:     General: Skin is warm and dry. Findings: No rash. Neurological:      General: No focal deficit present. Mental Status: She is alert and oriented to person, place, and time. Coordination: Coordination normal.   Psychiatric:         Mood and Affect: Mood normal.         Behavior: Behavior normal.         Thought Content:  Thought content normal. Judgment: Judgment normal.       Assessment/Plan:      Flori Perla was seen today for cough and other. Diagnoses and all orders for this visit:    Bronchitis with acute wheezing  -     benzonatate (TESSALON) 100 MG capsule; Take 1 capsule by mouth 3 times daily as needed for Cough  -     predniSONE (DELTASONE) 20 MG tablet; Take 1 tablet by mouth 2 times daily for 5 days  -     azithromycin (ZITHROMAX) 250 MG tablet; Take 1 tablet by mouth See Admin Instructions for 5 days 500mg on day 1 followed by 250mg on days 2 - 5    Change in facial mole    - Rest and increase fluids  - Tylenol as needed for pain and fever  - Good handwashing and clean all surfaces touched  - Call office with any questions or concerns, or if symptoms are getting worse or changing  - ER for any chest pain or SOB    - Follow up with Dermatology for lesion removal.    Return if symptoms worsen or fail to improve. Patient given educational materials - see patient instructions. Discussed use, benefit, and side effects of prescribed medications. All patient questions answered. Pt voiced understanding.         Electronically signed by MATTHEW Lerner CNP on 1/4/2023 at 12:20 PM

## 2023-02-02 ENCOUNTER — NURSE ONLY (OUTPATIENT)
Dept: LAB | Age: 78
End: 2023-02-02

## 2023-02-02 ENCOUNTER — OFFICE VISIT (OUTPATIENT)
Dept: FAMILY MEDICINE CLINIC | Age: 78
End: 2023-02-02

## 2023-02-02 VITALS
RESPIRATION RATE: 16 BRPM | TEMPERATURE: 97.8 F | DIASTOLIC BLOOD PRESSURE: 60 MMHG | WEIGHT: 125.8 LBS | HEART RATE: 68 BPM | BODY MASS INDEX: 21.59 KG/M2 | SYSTOLIC BLOOD PRESSURE: 130 MMHG

## 2023-02-02 DIAGNOSIS — R06.2 WHEEZING: ICD-10-CM

## 2023-02-02 DIAGNOSIS — J31.0 RHINITIS, UNSPECIFIED TYPE: ICD-10-CM

## 2023-02-02 DIAGNOSIS — J20.9 BRONCHITIS WITH BRONCHOSPASM: Primary | ICD-10-CM

## 2023-02-02 DIAGNOSIS — H65.93 MEE (MIDDLE EAR EFFUSION), BILATERAL: ICD-10-CM

## 2023-02-02 RX ORDER — GLUCOSAMINE/CHONDR SU A SOD 750-600 MG
TABLET ORAL
COMMUNITY

## 2023-02-02 RX ORDER — ALBUTEROL SULFATE 90 UG/1
2 AEROSOL, METERED RESPIRATORY (INHALATION) EVERY 4 HOURS PRN
Qty: 18 G | Refills: 3 | Status: SHIPPED | OUTPATIENT
Start: 2023-02-02

## 2023-02-02 SDOH — ECONOMIC STABILITY: INCOME INSECURITY: HOW HARD IS IT FOR YOU TO PAY FOR THE VERY BASICS LIKE FOOD, HOUSING, MEDICAL CARE, AND HEATING?: NOT HARD AT ALL

## 2023-02-02 SDOH — ECONOMIC STABILITY: HOUSING INSECURITY
IN THE LAST 12 MONTHS, WAS THERE A TIME WHEN YOU DID NOT HAVE A STEADY PLACE TO SLEEP OR SLEPT IN A SHELTER (INCLUDING NOW)?: NO

## 2023-02-02 SDOH — ECONOMIC STABILITY: FOOD INSECURITY: WITHIN THE PAST 12 MONTHS, YOU WORRIED THAT YOUR FOOD WOULD RUN OUT BEFORE YOU GOT MONEY TO BUY MORE.: NEVER TRUE

## 2023-02-02 SDOH — ECONOMIC STABILITY: FOOD INSECURITY: WITHIN THE PAST 12 MONTHS, THE FOOD YOU BOUGHT JUST DIDN'T LAST AND YOU DIDN'T HAVE MONEY TO GET MORE.: NEVER TRUE

## 2023-02-02 ASSESSMENT — ENCOUNTER SYMPTOMS
DIARRHEA: 0
BLOOD IN STOOL: 0
COUGH: 1
WHEEZING: 1
SORE THROAT: 0
ANAL BLEEDING: 0
ABDOMINAL DISTENTION: 0
COLOR CHANGE: 0
ABDOMINAL PAIN: 0
EYE REDNESS: 0
CONSTIPATION: 0
NAUSEA: 0
EYE DISCHARGE: 0
SHORTNESS OF BREATH: 0
RHINORRHEA: 0

## 2023-02-02 NOTE — PROGRESS NOTES
[facility]  [address] [place] [address]  Dept: [phone]  Loc: [phone]      Visit Date: 2/2/2023    Brandyn Gonzales a 69 y.o. female who presents today for:   Chief Complaint   Patient presents with    Cough     Pt c/o cough and some wheezing. Cough started about 5 days ago, the wheezing started 3-4 days ago. Pt has also been having some indigestion and some slight issues swallowing since have the cough and wheezing and would like to discuss. Pt can swallow, but sometimes it feels like it gets caught in her throat. HPI:     Was seen at [facility] for cough and congestion 10/14 - sx started 6 days prior - dx with viral illness - no meds given     10/18 seen in office - ongoing c/o cough, chest tightness, wheezing, and dizziness - feeling like she was veering to the side - given zithromax     Seen back 10/26 - dx shingles     Back to  11/13 for URI sy - flu and covid negative at that time - given albuterol, tessalon and prednisone - felt a little better with the steroids     Back in office 11/28 - c/o ongoing uri symptoms - worsening - given doxycycline, albuterol, and promethazine-dm. 12/06/2022 with continued symptoms - congestion - sinus pressure - dizziness - PND. No fevers. Given steroids and a chest xray order:  Impression   No acute intrathoracic process. Pulmonary hyperinflation and chronic findings are noted. Been using albuterol inhaler up to 4 times daily    Still feels wheezing off and on - coughing fits - shortness of breath occasionally, ear pain, congestion - no fevers    Using OTC medications with little relief    Non-smoker - second-hand as a child and ex .      HPI  Health Maintenance   Topic Date Due    Shingles vaccine (1 of 2) Never done    COVID-19 Vaccine (4 - Booster for Moderna series) 01/15/2022    Annual Wellness Visit (AWV)  09/16/2023    Depression Screen  01/04/2024    DTaP/Tdap/Td vaccine (2 - Td or Tdap) 10/12/2030    DEXA (modify frequency per FRAX score)  Completed    Flu vaccine  Completed    Pneumococcal 65+ years Vaccine  Completed    Hepatitis C screen  Completed    Hepatitis A vaccine  Aged Out    Hib vaccine  Aged Out    Meningococcal (ACWY) vaccine  Aged Out       Current Outpatient Medications   Medication Sig Dispense Refill    Ginkgo Biloba 120 MG TABS Take by mouth      NONFORMULARY Mullein 330 mg 3 times a day      APPLE CIDER VINEGAR PO Take by mouth      fluticasone-salmeterol (ADVAIR HFA) 45-21 MCG/ACT inhaler Inhale 2 puffs into the lungs 2 times daily 1 each 3    albuterol sulfate HFA (PROVENTIL;VENTOLIN;PROAIR) 108 (90 Base) MCG/ACT inhaler Inhale 2 puffs into the lungs every 4 hours as needed for Wheezing or Shortness of Breath 18 g 3    fluticasone (FLONASE) 50 MCG/ACT nasal spray 1 spray by Each Nostril route daily For 7 days 32 g 0    Red Yeast Rice 600 MG CAPS Take 1 capsule by mouth Daily with supper      pseudoephedrine (SUDAFED) 30 MG tablet Take 30 mg by mouth every 4 hours as needed for Congestion      NONFORMULARY Take by mouth multidophilus      NONFORMULARY Take 1 capsule by mouth 2 times daily (with meals) BREAKFAST AND LUNCH [facility] [facility] phosphatidyl serine + bacopa monnieri      Coenzyme Q10-Red Yeast Rice  MG CAPS Take 1 capsule by mouth 2 times daily (before meals) Supper and bedtime      letrozole (FEMARA) 2.5 MG tablet Take 1 tablet by mouth daily  3    NONFORMULARY Take 1 capsule by mouth 3 times daily [facility] by Source Natural      B Complex-Folic Acid (K-863 BALANCED TR PO) Take 1 tablet by mouth 3 times daily (before meals) [address] [facility]       Potassium 99 MG TABS Take 1 capsule by mouth every other day       diphenhydrAMINE-PE-APAP 25-5-325 MG TABS Take by mouth as needed      vitamin D (CHOLECALCIFEROL) 5000 units CAPS capsule Take 5,000 Units by mouth once a week STOP TIL MARCH THEN WEEKLY      Cinnamon 500 MG CAPS Take 500 mg by mouth 3 times daily       Ginger, Zingiber officinalis, (GINGER ROOT) 500 MG CAPS Take 1 capsule by mouth daily      Turmeric Curcumin 500 MG CAPS Take 3 capsules by mouth in the morning, at noon, and at bedtime      melatonin 3 MG TABS tablet Take 5 mg by mouth nightly as needed      Garlic 484 MG CAPS Take 1 capsule by mouth 2 times daily      Calcium Carbonate-Vit D-Min (CALCIUM 1200 PO) Take 1 tablet by mouth daily (before lunch)       Omega-3 Fatty Acids (FISH OIL) 1200 MG CAPS Take 3 capsules by mouth 4 times daily (before meals and nightly) [facility] pharmaceutical grade      vitamin E 400 UNIT capsule Take 400 Units by mouth daily      vitamin B-6 (PYRIDOXINE) 50 MG tablet Take 50 mg by mouth daily       Cyanocobalamin (VITAMIN B-12) 3000 MCG SUBL Place 2 tablets under the tongue daily       Biotin 2500 MCG CAPS Take 1 tablet by mouth every other day SKIP 4 DAYS BEFORE LAB TEST      NONFORMULARY Take 1 capsule by mouth daily as needed Orega Resp usually used in October      NONFORMULARY Take 1 capsule by mouth 2 times daily as needed Respiration Blend SP-3 usually used in October      Olive Leaf Extract 250 MG CAPS Take 1 capsule by mouth 2 times daily      Cetirizine HCl 10 MG CAPS Take by mouth nightly       loratadine (CLARITIN) 10 MG capsule Take 10 mg by mouth as needed       zoster recombinant adjuvanted vaccine [facility]) 50 MCG/0.5ML SUSR injection Inject 0.5 mLs into the muscle See Admin Instructions 1 dose now and repeat in 2-6 months (Patient not taking: Reported on 2/2/2023) 0.5 mL 0     No current facility-administered medications for this visit. Allergies   Allergen Reactions    Gluten Meal Other (See Comments)     Gluten,fruit, and sugar causes blisters of mouth    Orange Oil Other (See Comments)     Any acidic fruit    Pcn [Penicillins] Other (See Comments)     Unknown  Childhood reaction. Subjective:   Review of Systems   Constitutional:  Negative for chills, fatigue and fever.    HENT: Positive for ear pain. Negative for congestion, postnasal drip, rhinorrhea and sore throat. Eyes:  Negative for discharge and redness. Respiratory:  Positive for cough and wheezing. Negative for shortness of breath. Cardiovascular:  Negative for chest pain and leg swelling. Gastrointestinal:  Negative for abdominal distention, abdominal pain, anal bleeding, blood in stool, constipation, diarrhea and nausea. Skin:  Negative for color change and rash. Neurological:  Negative for facial asymmetry, speech difficulty and weakness. Hematological:  Does not bruise/bleed easily. Psychiatric/Behavioral:  Negative for agitation and confusion. Objective:     Vitals:    02/02/23 1007   BP: 130/60   Pulse: 68   Resp: 16   Temp: 97.8 °F (36.6 °C)   TempSrc: Oral   Weight: 125 lb 12.8 oz (57.1 kg)       Body mass index is 21.59 kg/m². Wt Readings from Last 3 Encounters:   02/02/23 125 lb 12.8 oz (57.1 kg)   01/04/23 121 lb (54.9 kg)   12/06/22 125 lb (56.7 kg)     BP Readings from Last 3 Encounters:   02/02/23 130/60   01/04/23 100/64   12/06/22 (!) 130/58       Physical Exam  HENT:      Right Ear: A middle ear effusion is present. Tympanic membrane is bulging. Left Ear: A middle ear effusion is present. Tympanic membrane is bulging. Nose: Congestion and rhinorrhea present. Pulmonary:      Breath sounds: Normal breath sounds. No wheezing.        Lab Results   Component Value Date    WBC 5.0 09/09/2022    HGB 13.6 09/09/2022    HCT 40.8 09/09/2022     09/09/2022    CHOL 266 (H) 09/09/2022    TRIG 78 09/09/2022    HDL 99 09/09/2022    LDLCALC 151 09/09/2022    AST 19 01/11/2022     01/11/2022    K 4.4 01/11/2022     01/11/2022    CREATININE 0.9 01/11/2022    BUN 29 (H) 01/11/2022    CO2 24 01/11/2022    TSH 1.680 12/08/2021    LABA1C 5.5 09/09/2022    LABGLOM 61 (A) 01/11/2022    MG 2.2 09/09/2022    CALCIUM 9.5 09/09/2022    VITD25 96 09/09/2022     :       Diagnosis Orders 1. Bronchitis with bronchospasm  Full PFT Study With Bronchodilator    fluticasone-salmeterol (ADVAIR HFA) 45-21 MCG/ACT inhaler    albuterol sulfate HFA (PROVENTIL;VENTOLIN;PROAIR) 108 (90 Base) MCG/ACT inhaler      2. Rhinitis, unspecified type  External Referral To ENT    Allergen Inhalant [place] Comp 1    Common Food Allergen Profile      3. Wheezing  Full PFT Study With Bronchodilator    fluticasone-salmeterol (ADVAIR HFA) 45-21 MCG/ACT inhaler    albuterol sulfate HFA (PROVENTIL;VENTOLIN;PROAIR) 108 (90 Base) MCG/ACT inhaler      4. CARL (middle ear effusion), bilateral  External Referral To ENT      5. Wheezing   Allergen Inhalant [place] Comp 1          : Will refer to ENT -  - ENT  Phone: [phone]    Will get a pulmonary function test  Will get some labs  Will add advair - 2 puffs twice daily - rinse mouth after each use      Return if symptoms worsen or fail to improve. Placed:  Orders Placed This Encounter   Procedures    Allergen Inhalant [place] Comp 1    Common Food Allergen Profile    External Referral To ENT    Full PFT Study With Bronchodilator     Medications Prescribed:  Orders Placed This Encounter   Medications    fluticasone-salmeterol (ADVAIR HFA) 45-21 MCG/ACT inhaler     Sig: Inhale 2 puffs into the lungs 2 times daily     Dispense:  1 each     Refill:  3    albuterol sulfate HFA (PROVENTIL;VENTOLIN;PROAIR) 108 (90 Base) MCG/ACT inhaler     Sig: Inhale 2 puffs into the lungs every 4 hours as needed for Wheezing or Shortness of Breath     Dispense:  18 g     Refill:  3          Discussed use,benefit, and side effects of prescribed medications. Barriers to medication complianceaddressed. All patient questions answered. Pt voiced understanding.      Electronicallysigned by MATTHEW Hong CNP on 2/2/2023 at 12:23 PM

## 2023-02-02 NOTE — PROGRESS NOTES
Referral to Dr. García Morejon at [facility] faxed on 2/2/23 with office note and insurance cards attached. They will call the patient to schedule an appt.     Phone number: [phone]  Fax number: [phone]

## 2023-02-02 NOTE — PATIENT INSTRUCTIONS
- ENT  Phone: [phone]    Will get a pulmonary function test  Will get some labs  Will add advair - 2 puffs twice daily - rinse mouth after each use

## 2023-02-11 ENCOUNTER — HOSPITAL ENCOUNTER (EMERGENCY)
Age: 78
Discharge: HOME | End: 2023-02-11
Payer: MEDICARE

## 2023-02-11 VITALS
RESPIRATION RATE: 16 BRPM | WEIGHT: 125 LBS | TEMPERATURE: 97.7 F | BODY MASS INDEX: 21.34 KG/M2 | HEIGHT: 64 IN | DIASTOLIC BLOOD PRESSURE: 80 MMHG | OXYGEN SATURATION: 97 % | HEART RATE: 78 BPM | SYSTOLIC BLOOD PRESSURE: 155 MMHG

## 2023-02-11 DIAGNOSIS — R42 VERTIGO: Primary | ICD-10-CM

## 2023-02-11 PROCEDURE — 99213 OFFICE O/P EST LOW 20 MIN: CPT | Performed by: NURSE PRACTITIONER

## 2023-02-11 PROCEDURE — 93005 ELECTROCARDIOGRAM TRACING: CPT | Performed by: NURSE PRACTITIONER

## 2023-02-11 PROCEDURE — 99213 OFFICE O/P EST LOW 20 MIN: CPT

## 2023-02-11 PROCEDURE — 6370000000 HC RX 637 (ALT 250 FOR IP): Performed by: NURSE PRACTITIONER

## 2023-02-11 RX ORDER — SCOLOPAMINE TRANSDERMAL SYSTEM 1 MG/1
1 PATCH, EXTENDED RELEASE TRANSDERMAL
Qty: 1 PATCH | Refills: 0 | Status: SHIPPED | OUTPATIENT
Start: 2023-02-11

## 2023-02-11 RX ORDER — MECLIZINE HCL 25MG 25 MG/1
25 TABLET, CHEWABLE ORAL ONCE
Status: COMPLETED | OUTPATIENT
Start: 2023-02-11 | End: 2023-02-11

## 2023-02-11 RX ADMIN — MECLIZINE HYDROCHLORIDE 25 MG: 25 TABLET, CHEWABLE ORAL at 15:14

## 2023-02-11 ASSESSMENT — ENCOUNTER SYMPTOMS
NAUSEA: 0
APNEA: 0
DIARRHEA: 0
CHOKING: 0
CHEST TIGHTNESS: 0
SHORTNESS OF BREATH: 0
SORE THROAT: 1
VISUAL CHANGE: 0
ABDOMINAL PAIN: 0
BLOOD IN STOOL: 0
STRIDOR: 0
VOMITING: 0
WHEEZING: 0
SINUS PRESSURE: 1
COUGH: 1
RHINORRHEA: 1

## 2023-02-11 ASSESSMENT — PAIN - FUNCTIONAL ASSESSMENT: PAIN_FUNCTIONAL_ASSESSMENT: NONE - DENIES PAIN

## 2023-02-11 NOTE — ED PROVIDER NOTES
[facility]  Urgent Care Encounter      CHIEF COMPLAINT       Chief Complaint   Patient presents with    Dizziness       Nurses Notes reviewed and I agree except as noted in the HPI. HISTORY OFPRESENT ILLNESS   Yesenia Dela Cruz is a 69 y.o. The history is provided by the patient. No  was used. Dizziness  Quality:  Vertigo  Severity:  Moderate  Onset quality: on going for weeks. Progression:  Worsening (ran out of antivert today)  Context: bending over, head movement, physical activity and standing up    Context: not with bowel movement, not with ear pain, not with eye movement, not with inactivity, not with loss of consciousness, not with medication and not when urinating    Relieved by:  Medication  Worsened by: Movement, turning head and standing up  Ineffective treatments:  Fluids (ran out of medication)  Associated symptoms: no blood in stool, no chest pain, no diarrhea, no headaches, no hearing loss, no nausea, no palpitations, no shortness of breath, no syncope, no tinnitus, no vision changes, no vomiting and no weakness    Risk factors: hx of vertigo    Risk factors: no anemia, no heart disease, no hx of stroke, no Meniere's disease, no multiple medications and no new medications      REVIEW OF SYSTEMS     Review of Systems   Constitutional:  Negative for activity change, appetite change, chills, diaphoresis, fatigue, fever and unexpected weight change. HENT:  Positive for congestion, postnasal drip, rhinorrhea, sinus pressure and sore throat. Negative for hearing loss and tinnitus. Respiratory:  Positive for cough. Negative for apnea, choking, chest tightness, shortness of breath, wheezing and stridor. Cardiovascular:  Negative for chest pain, palpitations, leg swelling and syncope. Gastrointestinal:  Negative for abdominal pain, blood in stool, diarrhea, nausea and vomiting. Neurological:  Positive for dizziness and light-headedness.  Negative for weakness and headaches. PAST MEDICAL HISTORY         Diagnosis Date    Cancer [facility])        SURGICAL HISTORY     Patient  has a past surgical history that includes Breast surgery and Finger trigger release (Right, 2017).     CURRENT MEDICATIONS       Discharge Medication List as of 2/11/2023  3:20 PM        CONTINUE these medications which have NOT CHANGED    Details   Ginkgo Biloba 120 MG TABS Take by mouthHistorical Med      !! NONFORMULARY Mullein 330 mg 3 times a dayHistorical Med      APPLE CIDER VINEGAR PO Take by mouthHistorical Med      fluticasone-salmeterol (ADVAIR HFA) 45-21 MCG/ACT inhaler Inhale 2 puffs into the lungs 2 times daily, Disp-1 each, R-3Normal      albuterol sulfate HFA (PROVENTIL;VENTOLIN;PROAIR) 108 (90 Base) MCG/ACT inhaler Inhale 2 puffs into the lungs every 4 hours as needed for Wheezing or Shortness of Breath, Disp-18 g, R-3Normal      fluticasone (FLONASE) 50 MCG/ACT nasal spray 1 spray by Each Nostril route daily For 7 days, Disp-32 g, R-0Normal      Red Yeast Rice 600 MG CAPS Take 1 capsule by mouth Daily with supperHistorical Med      pseudoephedrine (SUDAFED) 30 MG tablet Take 30 mg by mouth every 4 hours as needed for CongestionHistorical Med      !! NONFORMULARY Take by mouth multidophilusHistorical Med      !! NONFORMULARY Take 1 capsule by mouth 2 times daily (with meals) BREAKFAST AND LUNCH -[facility] [employer] [facility] phosphatidyl serine + bacopa monnieriHistorical Med      Coenzyme Q10-Red Yeast Rice  MG CAPS Take 1 capsule by mouth 2 times daily (before meals) Supper and bedtimeHistorical Med      letrozole (FEMARA) 2.5 MG tablet Take 1 tablet by mouth daily, R-3Historical Med      !! NONFORMULARY Take 1 capsule by mouth 3 times daily [facility] by Source NaturalHistorical Med      B Complex-Folic Acid (E-141 BALANCED TR PO) Take 1 tablet by mouth 3 times daily (before meals) [address] [facility] at [facility] [facility] Med      Potassium 99 MG TABS Take 1 capsule by mouth every other day Historical Med      diphenhydrAMINE-PE-APAP 25-5-325 MG TABS Take by mouth as neededHistorical Med      vitamin D (CHOLECALCIFEROL) 5000 units CAPS capsule Take 5,000 Units by mouth once a week STOP TIL MARCH THEN WEEKLYHistorical Med      Cinnamon 500 MG CAPS Take 500 mg by mouth 3 times daily Historical Med      Ginger, Zingiber officinalis, (GINGER ROOT) 500 MG CAPS Take 1 capsule by mouth dailyHistorical Med      Turmeric Curcumin 500 MG CAPS Take 3 capsules by mouth in the morning, at noon, and at bedtimeHistorical Med      melatonin 3 MG TABS tablet Take 5 mg by mouth nightly as neededHistorical Med      Garlic 576 MG CAPS Take 1 capsule by mouth 2 times dailyHistorical Med      Calcium Carbonate-Vit D-Min (CALCIUM 1200 PO) Take 1 tablet by mouth daily (before lunch) Historical Med      Omega-3 Fatty Acids (FISH OIL) 1200 MG CAPS Take 3 capsules by mouth 4 times daily (before meals and nightly) [facility] pharmaceutical gradeHistorical Med      vitamin E 400 UNIT capsule Take 400 Units by mouth dailyHistorical Med      vitamin B-6 (PYRIDOXINE) 50 MG tablet Take 50 mg by mouth daily Historical Med      Cyanocobalamin (VITAMIN B-12) 3000 MCG SUBL Place 2 tablets under the tongue daily Historical Med      Biotin 2500 MCG CAPS Take 1 tablet by mouth every other day SKIP 4 DAYS BEFORE LAB TESTHistorical Med      !! NONFORMULARY Take 1 capsule by mouth daily as needed Orega Resp usually used in OctoberHistorical Med      !! NONFORMULARY Take 1 capsule by mouth 2 times daily as needed Respiration Blend SP-3 usually used in OctoberHistorical Med      Olive Leaf Extract 250 MG CAPS Take 1 capsule by mouth 2 times dailyHistorical Med      Cetirizine HCl 10 MG CAPS Take by mouth nightly Historical Med      loratadine (CLARITIN) 10 MG capsule Take 10 mg by mouth as needed Historical Med       !! - Potential duplicate medications found. Please discuss with provider.           ALLERGIES     Patient is is allergic to gluten meal, orange oil, and pcn [penicillins]. FAMILY HISTORY     Patient's family history includes Breast Cancer in her sister; Cancer in her mother; Diabetes in her mother; High Blood Pressure in her brother and mother; No Known Problems in her brother, brother, and father. SOCIAL HISTORY     Patient  reports that she has never smoked. She has never used smokeless tobacco. She reports that she does not drink alcohol and does not use drugs. PHYSICAL EXAM     ED TRIAGE VITALS  BP: (!) 155/80, Temp: 97.7 °F (36.5 °C), Heart Rate: 78, Resp: 16, SpO2: 97 %  Physical Exam  Vitals and nursing note reviewed. Constitutional:       General: She is not in acute distress. Appearance: Normal appearance. She is normal weight. She is not ill-appearing, toxic-appearing or diaphoretic. HENT:      Head: Normocephalic and atraumatic. Right Ear: Tympanic membrane, ear canal and external ear normal. There is no impacted cerumen. Left Ear: Tympanic membrane, ear canal and external ear normal. There is no impacted cerumen. Nose: Congestion and rhinorrhea present. Mouth/Throat:      Mouth: Mucous membranes are moist.   Eyes:      Conjunctiva/sclera: Conjunctivae normal.      Pupils: Pupils are equal, round, and reactive to light. Neck:      Vascular: No carotid bruit. Cardiovascular:      Rate and Rhythm: Normal rate and regular rhythm. Heart sounds: Normal heart sounds. No murmur heard. No friction rub. No gallop. Pulmonary:      Effort: Pulmonary effort is normal. No respiratory distress. Breath sounds: Normal breath sounds. No stridor. No wheezing, rhonchi or rales. Chest:      Chest wall: No tenderness. Musculoskeletal:         General: Normal range of motion. Cervical back: Normal range of motion and neck supple. No rigidity or tenderness. Lymphadenopathy:      Cervical: No cervical adenopathy. Skin:     General: Skin is warm and dry.       Coloration: Skin is not jaundiced or pale. Findings: No bruising, erythema, lesion or rash. Neurological:      General: No focal deficit present. Mental Status: She is alert and oriented to person, place, and time. GCS: GCS eye subscore is 4. GCS verbal subscore is 5. GCS motor subscore is 6. Cranial Nerves: Cranial nerves 2-12 are intact. No cranial nerve deficit. Sensory: No sensory deficit. Motor: Motor function is intact. No weakness. Coordination: Coordination is intact. Coordination normal.      Gait: Gait normal.      Deep Tendon Reflexes: Reflexes normal.      Reflex Scores:       Patellar reflexes are 2+ on the right side and 2+ on the left side. Psychiatric:         Mood and Affect: Mood normal.         Behavior: Behavior normal.         Thought Content: Thought content normal.         Judgment: Judgment normal.       DIAGNOSTIC RESULTS   Labs:  Results for orders placed or performed during the hospital encounter of 02/11/23   EKG 12 Lead   Result Value Ref Range    Ventricular Rate 90 BPM    Atrial Rate 90 BPM    P-R Interval 126 ms    QRS Duration 86 ms    Q-T Interval 372 ms    QTc Calculation (Bazett) 455 ms    P Axis 75 degrees    R Axis 87 degrees    T Axis 68 degrees       IMAGING:  No orders to display     URGENT CARE COURSE:     Vitals:    02/11/23 1448   BP: (!) 155/80   Pulse: 78   Resp: 16   Temp: 97.7 °F (36.5 °C)   TempSrc: Temporal   SpO2: 97%   Weight: 125 lb (56.7 kg)   Height: 5' 4\" (1.626 m)       Medications   meclizine (ANTIVERT) chewable tablet 25 mg (25 mg Oral Given 2/11/23 6054)     PROCEDURES:  None  FINAL IMPRESSION      1. Vertigo        DISPOSITION/PLAN   Decision To Discharge     The parent or patient representative was advised that at this point the patient can be treated safely at home, the parent or Patient representative should be aware of the following symptoms.   The patient is to go slow from lying sitting and standing and the patient was also advised to not drive or operate heavy machinery while experiencing vertigo. The patient was also advised that if he develops any headaches, visual changes, slurred speech, dysphagia,weakness of extremities or any other symptoms the patient is to dial 911 or go directly to the emergency department. the patient or patient's representative are agreeable to the management of care at this time. They were advised to follow-up with her primary care provider in 2-3 days if no changes or go to the emergency department for evaluation. The patient/Patient representative are agreeable to the treatment plan and left ambulatory without any problems.        PATIENT REFERRED TO:  [facility] [address] [id number]  Go today  If symptoms worsen    MATTHEW Tavarez - CNP  [facility] [address]    Schedule an appointment as soon as possible for a visit     DISCHARGE MEDICATIONS:  Discharge Medication List as of 2/11/2023  3:20 PM        START taking these medications    Details   scopolamine (TRANSDERM-SCOP) transdermal patch Place 1 patch onto the skin every 72 hours, Disp-1 patch, R-0Normal           Discharge Medication List as of 2/11/2023  3:20 PM          Jamesetta Favre, APRN - CNP Jamesetta Favre, APRN - CNP  02/11/23 1747

## 2023-02-12 LAB
EKG ATRIAL RATE: 90 BPM
EKG P AXIS: 75 DEGREES
EKG P-R INTERVAL: 126 MS
EKG Q-T INTERVAL: 372 MS
EKG QRS DURATION: 86 MS
EKG QTC CALCULATION (BAZETT): 455 MS
EKG R AXIS: 87 DEGREES
EKG T AXIS: 68 DEGREES
EKG VENTRICULAR RATE: 90 BPM

## 2023-02-12 PROCEDURE — 93010 ELECTROCARDIOGRAM REPORT: CPT | Performed by: INTERNAL MEDICINE

## 2023-02-13 ENCOUNTER — TELEPHONE (OUTPATIENT)
Dept: FAMILY MEDICINE CLINIC | Age: 78
End: 2023-02-13

## 2023-02-13 NOTE — LETTER
[address] [facility]  [address] [facility], [address]  Phone: [phone]  Fax: [phone]    February 13, 2023    Carroll Becker  [address] [place] [address]    Dear Maximus Post,    Thank you for choosing our [facility] on 2/11/23. Selina Mcdonald wanted to make sure that you understand your discharge instructions and that you were able to fill any prescriptions that may have been ordered for you. Please contact the office at the above phone number if you were advised to follow up with Selina Mcdonald, or if you have any further questions or needs. Also, did you know -                             [facility] ([facility]) practices can often offer you an appointment on the same day that you call for acute issues. *We have some [facility] offices that offer Walk-in appointments; check our website for availability in your community, [email or web address].      *Evisits are now available for patients through [address] [facility]) also offers video visits through [address] If you do not have MyChart and are interested, please contact the office and a staff member may assist you or go to [email or web address].     Sincerely,   MATTHEW Lara CNP and your [facility]

## 2023-02-23 ENCOUNTER — HOSPITAL ENCOUNTER (EMERGENCY)
Age: 78
Discharge: HOME | End: 2023-02-23
Payer: MEDICARE

## 2023-02-23 VITALS
BODY MASS INDEX: 19.63 KG/M2 | SYSTOLIC BLOOD PRESSURE: 128 MMHG | OXYGEN SATURATION: 96 % | RESPIRATION RATE: 14 BRPM | TEMPERATURE: 98.5 F | WEIGHT: 115 LBS | HEIGHT: 64 IN | DIASTOLIC BLOOD PRESSURE: 66 MMHG | HEART RATE: 65 BPM

## 2023-02-23 DIAGNOSIS — M70.41 PREPATELLAR BURSITIS OF RIGHT KNEE: Primary | ICD-10-CM

## 2023-02-23 PROCEDURE — 99213 OFFICE O/P EST LOW 20 MIN: CPT | Performed by: NURSE PRACTITIONER

## 2023-02-23 PROCEDURE — 99213 OFFICE O/P EST LOW 20 MIN: CPT

## 2023-02-23 ASSESSMENT — ENCOUNTER SYMPTOMS
WHEEZING: 0
RHINORRHEA: 0
CONSTIPATION: 0
EYE PAIN: 0
DIARRHEA: 0
NAUSEA: 0
SHORTNESS OF BREATH: 0
VOMITING: 0
ABDOMINAL PAIN: 0
COUGH: 0
BLOOD IN STOOL: 0

## 2023-02-23 ASSESSMENT — PAIN - FUNCTIONAL ASSESSMENT: PAIN_FUNCTIONAL_ASSESSMENT: NONE - DENIES PAIN

## 2023-02-23 NOTE — ED PROVIDER NOTES
[phone]       Chief Complaint   Patient presents with    Leg Swelling     Right leg         Nurses Notes reviewed and I agree except as noted in the HPI. HISTORY OF PRESENT ILLNESS   Bessy Carias is a 69 y.o. female who presents to urgent care with complaint of right knee swelling that started 2 weeks ago after a fall. Patient states that she had a mechanical fall where she landed on her right knee. She states that since that time she has had swelling without much pain. She denies tenderness on palpation. Patient denies numbness or tingling. Patient is able to move the knee, ankle and toes without difficulty. Dorsalis pedis and posterior tibialis pulses are 2+. Cap refill is less then 2 seconds at the nailbed. Sensation is intact to the toes. REVIEW OF SYSTEMS     Review of Systems   Constitutional:  Negative for appetite change, chills, fatigue, fever and unexpected weight change. HENT:  Negative for ear pain and rhinorrhea. Eyes:  Negative for pain and visual disturbance. Respiratory:  Negative for cough, shortness of breath and wheezing. Cardiovascular:  Negative for chest pain, palpitations and leg swelling. Gastrointestinal:  Negative for abdominal pain, blood in stool, constipation, diarrhea, nausea and vomiting. Genitourinary:  Negative for dysuria, frequency and hematuria. Musculoskeletal:  Positive for arthralgias and joint swelling (right knee). Negative for neck stiffness. Skin:  Negative for rash. Neurological:  Negative for dizziness, syncope, weakness, light-headedness and headaches. Hematological:  Does not bruise/bleed easily. PAST MEDICAL HISTORY         Diagnosis Date    Cancer [facility])        SURGICAL HISTORY     Patient  has a past surgical history that includes Breast surgery and Finger trigger release (Right, 2017).     CURRENT MEDICATIONS       Previous Medications    ALBUTEROL SULFATE HFA (PROVENTIL;VENTOLIN;PROAIR) 108 (90 BASE) MCG/ACT INHALER    Inhale 2 puffs into the lungs every 4 hours as needed for Wheezing or Shortness of Breath    APPLE CIDER VINEGAR PO    Take by mouth    B COMPLEX-FOLIC ACID (C-990 BALANCED TR PO)    Take 1 tablet by mouth 3 times daily (before meals) [address] [facility] 2500 MCG CAPS    Take 1 tablet by mouth every other day SKIP 4 DAYS BEFORE LAB TEST    CALCIUM CARBONATE-VIT D-MIN (CALCIUM 1200 PO)    Take 1 tablet by mouth daily (before lunch)     CETIRIZINE HCL 10 MG CAPS    Take by mouth nightly     CINNAMON 500 MG CAPS    Take 500 mg by mouth 3 times daily     COENZYME Q10-RED YEAST RICE  MG CAPS    Take 1 capsule by mouth 2 times daily (before meals) Supper and bedtime    CYANOCOBALAMIN (VITAMIN B-12) 3000 MCG SUBL    Place 2 tablets under the tongue daily     DIPHENHYDRAMINE-PE-APAP 25-5-325 MG TABS    Take by mouth as needed    FLUTICASONE (FLONASE) 50 MCG/ACT NASAL SPRAY    1 spray by Each Nostril route daily For 7 days    FLUTICASONE-SALMETEROL (ADVAIR HFA) 45-21 MCG/ACT INHALER    Inhale 2 puffs into the lungs 2 times daily    GARLIC 205 MG CAPS    Take 1 capsule by mouth 2 times daily    GINGER, ZINGIBER OFFICINALIS, (GINGER ROOT) 500 MG CAPS    Take 1 capsule by mouth daily    GINKGO BILOBA 120 MG TABS    Take by mouth    LETROZOLE (FEMARA) 2.5 MG TABLET    Take 1 tablet by mouth daily    LORATADINE (CLARITIN) 10 MG CAPSULE    Take 10 mg by mouth as needed     MELATONIN 3 MG TABS TABLET    Take 5 mg by mouth nightly as needed    NONFORMULARY    Take 1 capsule by mouth daily as needed Orega Resp usually used in October    NONFORMULARY    Take 1 capsule by mouth 2 times daily as needed Respiration Blend SP-3 usually used in October    NONFORMULARY    Take 1 capsule by mouth 3 times daily Magtein by Source Natural    NONFORMULARY    Take 1 capsule by mouth 2 times daily (with meals) BREAKFAST AND LUNCH [facility] serine + bacopa monnieri    NONFORMULARY    Take by mouth multidophilus    NONFORMULARY    Mullein 330 mg 3 times a day    OLIVE LEAF EXTRACT 250 MG CAPS    Take 1 capsule by mouth 2 times daily    OMEGA-3 FATTY ACIDS (FISH OIL) 1200 MG CAPS    Take 3 capsules by mouth 4 times daily (before meals and nightly) [employer] pharmaceutical grade    POTASSIUM 99 MG TABS    Take 1 capsule by mouth every other day     PSEUDOEPHEDRINE (SUDAFED) 30 MG TABLET    Take 30 mg by mouth every 4 hours as needed for Congestion    RED YEAST RICE 600 MG CAPS    Take 1 capsule by mouth Daily with supper    SCOPOLAMINE (TRANSDERM-SCOP) TRANSDERMAL PATCH    Place 1 patch onto the skin every 72 hours    TURMERIC CURCUMIN 500 MG CAPS    Take 3 capsules by mouth in the morning, at noon, and at bedtime    VITAMIN B-6 (PYRIDOXINE) 50 MG TABLET    Take 50 mg by mouth daily     VITAMIN D (CHOLECALCIFEROL) 5000 UNITS CAPS CAPSULE    Take 5,000 Units by mouth once a week STOP TIL MARCH THEN WEEKLY    VITAMIN E 400 UNIT CAPSULE    Take 400 Units by mouth daily       ALLERGIES     Patient is is allergic to gluten meal, orange oil, and pcn [penicillins]. FAMILY HISTORY     Patient'sfamily history includes Breast Cancer in her sister; Cancer in her mother; Diabetes in her mother; High Blood Pressure in her brother and mother; No Known Problems in her brother, brother, and father. SOCIAL HISTORY     Patient  reports that she has never smoked. She has never used smokeless tobacco. She reports that she does not drink alcohol and does not use drugs. PHYSICAL EXAM     ED TRIAGE VITALS  BP: 128/66, Temp: 98.5 °F (36.9 °C), Heart Rate: 65,  , SpO2: 96 %  Physical Exam  Vitals and nursing note reviewed. Constitutional:       Appearance: She is not diaphoretic. HENT:      Head: Normocephalic and atraumatic.       Right Ear: External ear normal.      Left Ear: External ear normal.   Eyes:      Conjunctiva/sclera: Conjunctivae normal.   Pulmonary: Effort: Pulmonary effort is normal. No respiratory distress. Abdominal:      General: There is no distension. Musculoskeletal:      Cervical back: Normal range of motion. Right knee: Swelling present. No bony tenderness. Normal range of motion. No tenderness. Normal pulse. Left knee: Normal pulse. Right lower leg: No swelling, deformity, lacerations, tenderness or bony tenderness. No edema. Legs:    Skin:     General: Skin is warm and dry. Neurological:      Mental Status: She is alert. DIAGNOSTIC RESULTS   Labs:No results found for this visit on 02/23/23. IMAGING:  No orders to display     URGENT CARE COURSE:        MDM      Patient presents to urgent care with complaint of right knee swelling that started 2 weeks ago after a fall. Patient has soft area of swelling just below her right knee. This does appear to be a bursitis. Patient instructed to rest, ice and elevate. She is also instructed to use anti-inflammatories such as ibuprofen or Aleve. Patient to follow-up with orthopedic [facility] if not better in the next 2 weeks. Patient instructed to go to ER for worsening symptoms, chest pain, shortness of breath, inability keep liquids down, inability urinate for greater than 8 hours or numbness and tingling in the extremities. May take Tylenol or ibuprofen as needed for pain. Follow-up with your primary care provider. Apply ice as needed for comfort for up to 15 minutes at a time. Medications - No data to display  PROCEDURES:    Procedures    FINALIMPRESSION      1.  Prepatellar bursitis of right knee        DISPOSITION/PLAN   DISPOSITION Decision To Discharge 02/23/2023 12:33:03 PM    PATIENT REFERRED TO:  [facility], [facility]  [address]  [phone]  [phone]      DISCHARGE MEDICATIONS:  New Prescriptions    No medications on file     Current Discharge Medication List          MATTHEW Durán - CNP        MATTHEW Durán - CNP  02/23/23 1237

## 2023-02-24 ENCOUNTER — TELEPHONE (OUTPATIENT)
Dept: FAMILY MEDICINE CLINIC | Age: 78
End: 2023-02-24

## 2023-02-24 NOTE — LETTER
[address] [facility]  [address] [facility], [address]  Phone: [phone]  Fax: [phone]    February 24, 2023    Kathryn June  [facility]    Dear Tatyana Belview,    Thank you for choosing our [facility] on 2/23/23. Dr. Nicole Parry wanted to make sure that you understand your discharge instructions and that you were able to fill any prescriptions that may have been ordered for you. Please contact the office at the above phone number if you were advised to follow up with Dr. Nicole Parry, or if you have any further questions or needs. Also, did you know -                             [facility] ([facility]) practices can often offer you an appointment on the same day that you call for acute issues. *We have some [facility] offices that offer Walk-in appointments; check our website for availability in your community, [email or web address]. [email or web address].      *Evisits are now available for patients through [address] [facility]) also offers video visits through [address] If you do not have MyChart and are interested, please contact the office and a staff member may assist you or go to [email or web address].     Sincerely,   MATTHEW Pierce CNP and your [facility]

## 2023-03-05 ENCOUNTER — HOSPITAL ENCOUNTER (EMERGENCY)
Age: 78
Discharge: HOME | End: 2023-03-05
Attending: FAMILY MEDICINE
Payer: MEDICARE

## 2023-03-05 VITALS
SYSTOLIC BLOOD PRESSURE: 120 MMHG | DIASTOLIC BLOOD PRESSURE: 94 MMHG | OXYGEN SATURATION: 94 % | TEMPERATURE: 97.9 F | RESPIRATION RATE: 20 BRPM | HEART RATE: 91 BPM

## 2023-03-05 DIAGNOSIS — J40 BRONCHITIS: Primary | ICD-10-CM

## 2023-03-05 PROCEDURE — 99282 EMERGENCY DEPT VISIT SF MDM: CPT

## 2023-03-05 NOTE — DISCHARGE INSTRUCTIONS
You may take Zyrtec 10 mg twice a day. You may also start taking Pepcid. Follow-up with your primary care physician in the next 2 days for reevaluation. Return to the emergency part for any new or worsening symptoms.

## 2023-03-05 NOTE — ED NOTES
Patient to ED for cough x3 months. Patient reports she is \"allergic to the weather\". Patient states if there is a change in the temp by 40 degrees she gets a cough.  patient has been seen several times by PCP and ENT      Lady Kali RN  03/05/23 8638

## 2023-03-05 NOTE — ED PROVIDER NOTES
[address] [facility] [address] [address] EMERGENCY DEPT      EMERGENCY MEDICINE     Pt Name: Wesley Echavarria  MRN: [id number]  [place] 1945  Date of evaluation: 3/5/2023  Provider: Jenna Ware MD  Supervising Physician: Dr Alejandro Wilkins   Patient presents with    Cough     X3 months     HISTORY OF PRESENT ILLNESS   Wesley Echavarria is a 69 y.o. female who presents to the emergency department for cough over the last 3 months has been waxing waning. However worsened over the last few days. She states this typically happens with seasonal changes and weather changes. She feels that she has increasing sinus drainage. She denies any fever or chills denies any chest pain or shortness of breath. Denies any leg swelling. Denies any recent international travel.       PASTMEDICAL HISTORY     Past Medical History:   Diagnosis Date    Cancer [facility])        Patient Active Problem List   Diagnosis Code    [facility] C50.919    Chronic pain G89.29    Mixed hyperlipidemia E78.2    Vitamin D deficiency E55.9    Other fatigue R53.83    Low glucose level R73.09     SURGICAL HISTORY       Past Surgical History:   Procedure Laterality Date    BREAST SURGERY      FINGER TRIGGER RELEASE Right 2017       CURRENT MEDICATIONS       Previous Medications    ALBUTEROL SULFATE HFA (PROVENTIL;VENTOLIN;PROAIR) 108 (90 BASE) MCG/ACT INHALER    Inhale 2 puffs into the lungs every 4 hours as needed for Wheezing or Shortness of Breath    APPLE CIDER VINEGAR PO    Take by mouth    B COMPLEX-FOLIC ACID (I-545 BALANCED TR PO)    Take 1 tablet by mouth 3 times daily (before meals) [address] [facility] 66 2500 MCG CAPS    Take 1 tablet by mouth every other day SKIP 4 DAYS BEFORE LAB TEST    CALCIUM CARBONATE-VIT D-MIN (CALCIUM 1200 PO)    Take 1 tablet by mouth daily (before lunch)     CETIRIZINE HCL 10 MG CAPS    Take by mouth nightly     CINNAMON 500 MG CAPS    Take 500 mg by mouth 3 times daily COENZYME Q10-RED YEAST RICE  MG CAPS    Take 1 capsule by mouth 2 times daily (before meals) Supper and bedtime    CYANOCOBALAMIN (VITAMIN B-12) 3000 MCG SUBL    Place 2 tablets under the tongue daily     DIPHENHYDRAMINE-PE-APAP 25-5-325 MG TABS    Take by mouth as needed    FLUTICASONE (FLONASE) 50 MCG/ACT NASAL SPRAY    1 spray by Each Nostril route daily For 7 days    FLUTICASONE-SALMETEROL (ADVAIR HFA) 45-21 MCG/ACT INHALER    Inhale 2 puffs into the lungs 2 times daily    GARLIC 434 MG CAPS    Take 1 capsule by mouth 2 times daily    GINGER, ZINGIBER OFFICINALIS, (GINGER ROOT) 500 MG CAPS    Take 1 capsule by mouth daily    GINKGO BILOBA 120 MG TABS    Take by mouth    LETROZOLE (FEMARA) 2.5 MG TABLET    Take 1 tablet by mouth daily    LORATADINE (CLARITIN) 10 MG CAPSULE    Take 10 mg by mouth as needed     MELATONIN 3 MG TABS TABLET    Take 5 mg by mouth nightly as needed    NONFORMULARY    Take 1 capsule by mouth daily as needed Orega Resp usually used in October    NONFORMULARY    Take 1 capsule by mouth 2 times daily as needed Respiration Blend SP-3 usually used in October    NONFORMULARY    Take 1 capsule by mouth 3 times daily Magtein by Source Natural    NONFORMULARY    Take 1 capsule by mouth 2 times daily (with meals) BREAKFAST AND LUNCH [facility] phosphatidyl serine + bacopa monnieri    NONFORMULARY    Take by mouth multidophilus    NONFORMULARY    Mullein 330 mg 3 times a day    OLIVE LEAF EXTRACT 250 MG CAPS    Take 1 capsule by mouth 2 times daily    OMEGA-3 FATTY ACIDS (FISH OIL) 1200 MG CAPS    Take 3 capsules by mouth 4 times daily (before meals and nightly) [employer] pharmaceutical grade    POTASSIUM 99 MG TABS    Take 1 capsule by mouth every other day     PSEUDOEPHEDRINE (SUDAFED) 30 MG TABLET    Take 30 mg by mouth every 4 hours as needed for Congestion    RED YEAST RICE 600 MG CAPS    Take 1 capsule by mouth Daily with supper    SCOPOLAMINE (TRANSDERM-SCOP) TRANSDERMAL PATCH Place 1 patch onto the skin every 72 hours    TURMERIC CURCUMIN 500 MG CAPS    Take 3 capsules by mouth in the morning, at noon, and at bedtime    VITAMIN B-6 (PYRIDOXINE) 50 MG TABLET    Take 50 mg by mouth daily     VITAMIN D (CHOLECALCIFEROL) 5000 UNITS CAPS CAPSULE    Take 5,000 Units by mouth once a week STOP TIL MARCH THEN WEEKLY    VITAMIN E 400 UNIT CAPSULE    Take 400 Units by mouth daily       ALLERGIES     is allergic to gluten meal, orange oil, and pcn [penicillins]. FAMILY HISTORY     She indicated that her mother is . She indicated that her father is . She indicated that her sister is alive. She indicated that all of her three brothers are alive. SOCIAL HISTORY       Social History     Tobacco Use    Smoking status: Never    Smokeless tobacco: Never   Vaping Use    Vaping Use: Never used   Substance Use Topics    Alcohol use: No    Drug use: No       PHYSICAL EXAM       ED Triage Vitals   BP Temp Temp Source Heart Rate Resp SpO2 Height Weight   23 1520 23 1520 23 1520 23 1519 23 1519 23 1519 -- --   (!) 120/94 97.9 °F (36.6 °C) Oral 91 20 94 %         Physical Exam  Vitals and nursing note reviewed. Constitutional:       General: She is not in acute distress. Appearance: She is not toxic-appearing or diaphoretic. HENT:      Head: Normocephalic and atraumatic. Right Ear: External ear normal.      Left Ear: External ear normal.      Nose: Nose normal.      Mouth/Throat:      Mouth: Mucous membranes are moist.      Pharynx: Oropharynx is clear. Comments: Cobblestoning in the posterior oropharynx  Eyes:      General: No scleral icterus. Conjunctiva/sclera: Conjunctivae normal.   Cardiovascular:      Rate and Rhythm: Normal rate and regular rhythm. Pulses: Normal pulses. Heart sounds: Normal heart sounds. Pulmonary:      Effort: Pulmonary effort is normal. No respiratory distress.       Breath sounds: Normal breath sounds. No stridor. No wheezing or rales. Abdominal:      General: Abdomen is flat. There is no distension. Palpations: Abdomen is soft. Tenderness: There is no abdominal tenderness. There is no guarding or rebound. Musculoskeletal:         General: Normal range of motion. Cervical back: Normal range of motion and neck supple. No rigidity. No muscular tenderness. Lymphadenopathy:      Cervical: No cervical adenopathy. Skin:     General: Skin is warm and dry. Capillary Refill: Capillary refill takes less than 2 seconds. Coloration: Skin is not jaundiced. Neurological:      General: No focal deficit present. Mental Status: She is alert and oriented to person, place, and time. Psychiatric:         Mood and Affect: Mood normal.         Behavior: Behavior normal.         FORMAL DIAGNOSTIC RESULTS     RADIOLOGY: Interpretation per the Radiologist below, if available at the time of this note (none if blank): No orders to display       LABS: (none if blank)  Labs Reviewed - No data to display    (Any cultures that may have been sent were not resulted at the time of this patient visit)    [facility] / ED COURSE:     3)  Treatment and Disposition         ED Reassessment:  See ED course         Case discussed with consulting clinician:           Shared Decision-Making was performed and disposition discussed with the        Patient/Family and questions answered          Social determinants of health impacting treatment or disposition:  none    Summary of Patient Presentation:           MDM:   There is a well-appearing 52-year-old female with cough for last 3 months and came in for evaluation of her increasing sinus drainage last couple days. No fevers or chills and lungs are clear to auscultation no respiratory distress or no labored breathing patient declined chest x-ray. She will increase her Zyrtec to twice daily.   She will take Pepcid for some GERD that may be acting up in the last couple days that she is no longer treating. She advised to follow-up with her primary care physician next couple days as well          Vitals Reviewed:    Vitals:    03/05/23 1519 03/05/23 1520   BP:  (!) 120/94   Pulse: 91    Resp: 20    Temp:  97.9 °F (36.6 °C)   TempSrc:  Oral   SpO2: 94%        The patient was seen and examined. Appropriate diagnostic testing was performed and results reviewed with the patient. The results of pertinent diagnostic studies and exam findings were discussed. The patients provisional diagnosis and plan of care were discussed with the patient and present family who expressed understanding. Any medications were reviewed and indications and risks of medications were discussed with the patient /family present. Strict verbal and written return precautions, instructions and appropriate follow-up provided to  the patient . ED Medications administered this visit:  (None if blank)  Medications - No data to display      PROCEDURES: (None if blank)  Procedures:     CRITICAL CARE: (None if blank)      DISCHARGE PRESCRIPTIONS: (None if blank)  New Prescriptions    No medications on file       FINAL IMPRESSION      1.  Bronchitis          DISPOSITION/PLAN   DISPOSITION Decision To Discharge 03/05/2023 04:06:34 PM      OUTPATIENT FOLLOW UP THE PATIENT:  Kei Peewee, APRN - CNP  [facility] [address]  [phone]    Schedule an appointment as soon as possible for a visit in 3 days      MD Neida Alicea MD  Resident  03/05/23 5036

## 2023-03-15 ENCOUNTER — NURSE ONLY (OUTPATIENT)
Dept: LAB | Age: 78
End: 2023-03-15

## 2023-03-15 DIAGNOSIS — R06.2 WHEEZING: ICD-10-CM

## 2023-03-15 DIAGNOSIS — C50.011 MALIGNANT NEOPLASM OF NIPPLE OF RIGHT BREAST IN FEMALE, UNSPECIFIED ESTROGEN RECEPTOR STATUS (HCC): ICD-10-CM

## 2023-03-15 DIAGNOSIS — R53.83 OTHER FATIGUE: ICD-10-CM

## 2023-03-15 DIAGNOSIS — E55.9 VITAMIN D DEFICIENCY: ICD-10-CM

## 2023-03-15 DIAGNOSIS — K90.89 OTHER INTESTINAL MALABSORPTION: ICD-10-CM

## 2023-03-15 DIAGNOSIS — R23.2 HOT FLASHES: ICD-10-CM

## 2023-03-15 DIAGNOSIS — R35.0 URINARY FREQUENCY: ICD-10-CM

## 2023-03-15 DIAGNOSIS — E78.2 MIXED HYPERLIPIDEMIA: ICD-10-CM

## 2023-03-15 DIAGNOSIS — R73.09 LOW GLUCOSE LEVEL: ICD-10-CM

## 2023-03-15 DIAGNOSIS — Z13.29 THYROID DISORDER SCREEN: ICD-10-CM

## 2023-03-15 DIAGNOSIS — E78.41 ELEVATED LIPOPROTEIN(A): ICD-10-CM

## 2023-03-15 DIAGNOSIS — J20.9 BRONCHITIS WITH BRONCHOSPASM: ICD-10-CM

## 2023-03-15 DIAGNOSIS — Z71.89 ENCOUNTER FOR HERB AND VITAMIN SUPPLEMENT MANAGEMENT: ICD-10-CM

## 2023-03-15 LAB
25(OH)D3 SERPL-MCNC: 41 NG/ML (ref 30–100)
BASOPHILS ABSOLUTE: 0.1 THOU/MM3 (ref 0–0.1)
BASOPHILS NFR BLD AUTO: 1.3 %
CALCIUM SERPL-MCNC: 9.1 MG/DL (ref 8.5–10.5)
CHOLEST SERPL-MCNC: 254 MG/DL (ref 100–199)
DEPRECATED MEAN GLUCOSE BLD GHB EST-ACNC: 114 MG/DL (ref 70–126)
DEPRECATED RDW RBC AUTO: 45.2 FL (ref 35–45)
EOSINOPHIL NFR BLD AUTO: 8.7 %
EOSINOPHILS ABSOLUTE: 0.5 THOU/MM3 (ref 0–0.4)
ERYTHROCYTE [DISTWIDTH] IN BLOOD BY AUTOMATED COUNT: 13.4 % (ref 11.5–14.5)
ERYTHROCYTE [SEDIMENTATION RATE] IN BLOOD BY WESTERGREN METHOD: 23 MM/HR (ref 0–20)
HBA1C MFR BLD HPLC: 5.8 % (ref 4.4–6.4)
HCT VFR BLD AUTO: 38.8 % (ref 37–47)
HDLC SERPL-MCNC: 97 MG/DL
HGB BLD-MCNC: 13 GM/DL (ref 12–16)
IMM GRANULOCYTES # BLD AUTO: 0 THOU/MM3 (ref 0–0.07)
IMM GRANULOCYTES NFR BLD AUTO: 0 %
LDLC SERPL CALC-MCNC: 138 MG/DL
LYMPHOCYTES ABSOLUTE: 1.9 THOU/MM3 (ref 1–4.8)
LYMPHOCYTES NFR BLD AUTO: 34.9 %
MAGNESIUM SERPL-MCNC: 2.2 MG/DL (ref 1.6–2.4)
MCH RBC QN AUTO: 31.3 PG (ref 26–33)
MCHC RBC AUTO-ENTMCNC: 33.5 GM/DL (ref 32.2–35.5)
MCV RBC AUTO: 93.3 FL (ref 81–99)
MONOCYTES ABSOLUTE: 0.4 THOU/MM3 (ref 0.4–1.3)
MONOCYTES NFR BLD AUTO: 8 %
NEUTROPHILS NFR BLD AUTO: 47.1 %
NRBC BLD AUTO-RTO: 0 /100 WBC
PLATELET # BLD AUTO: 270 THOU/MM3 (ref 130–400)
PMV BLD AUTO: 10.2 FL (ref 9.4–12.4)
PROGEST SERPL-MCNC: < 0.05 NG/ML
PTH-INTACT SERPL-MCNC: 27.9 PG/ML (ref 15–65)
RBC # BLD AUTO: 4.16 MILL/MM3 (ref 4.2–5.4)
SEGMENTED NEUTROPHILS ABSOLUTE COUNT: 2.5 THOU/MM3 (ref 1.8–7.7)
TRIGL SERPL-MCNC: 95 MG/DL (ref 0–199)
WBC # BLD AUTO: 5.4 THOU/MM3 (ref 4.8–10.8)

## 2023-03-16 LAB
C-REACTIVE PROTEIN, HIGH SENSITIVITY: 1.5 MG/L
ESTRADIOL LEVEL: 7.4 PG/ML (ref 5–50)
FOLLICLE STIMULATING HORMONE: 54.5 MIU/ML (ref 25.8–134.8)
HOMOCYSTEINE: 7.6 UMOL/L
LUTEINIZING HORMONE: 31.7 MIU/ML (ref 7.7–58.5)

## 2023-03-17 LAB — DHEA-S SERPL-MCNC: 263 UG/DL (ref 10–90)

## 2023-03-20 LAB
DHEA SERPL-MCNC: 2.47 NG/ML (ref 0.63–4.7)
TESTOSTERONE, FREE W SHGB, FEMALES/CHILDREN: NORMAL

## 2023-03-23 ENCOUNTER — OFFICE VISIT (OUTPATIENT)
Dept: FAMILY MEDICINE CLINIC | Age: 78
End: 2023-03-23

## 2023-03-23 VITALS
BODY MASS INDEX: 20.59 KG/M2 | RESPIRATION RATE: 10 BRPM | WEIGHT: 120.6 LBS | TEMPERATURE: 97.4 F | OXYGEN SATURATION: 96 % | SYSTOLIC BLOOD PRESSURE: 128 MMHG | HEART RATE: 72 BPM | DIASTOLIC BLOOD PRESSURE: 64 MMHG | HEIGHT: 64 IN

## 2023-03-23 DIAGNOSIS — K90.89 OTHER INTESTINAL MALABSORPTION: ICD-10-CM

## 2023-03-23 DIAGNOSIS — Z71.89 ENCOUNTER FOR HERB AND VITAMIN SUPPLEMENT MANAGEMENT: ICD-10-CM

## 2023-03-23 DIAGNOSIS — E78.2 MIXED HYPERLIPIDEMIA: ICD-10-CM

## 2023-03-23 DIAGNOSIS — C50.011 MALIGNANT NEOPLASM OF NIPPLE OF RIGHT BREAST IN FEMALE, UNSPECIFIED ESTROGEN RECEPTOR STATUS (HCC): ICD-10-CM

## 2023-03-23 DIAGNOSIS — R73.09 LOW GLUCOSE LEVEL: ICD-10-CM

## 2023-03-23 DIAGNOSIS — Z13.29 THYROID DISORDER SCREEN: ICD-10-CM

## 2023-03-23 DIAGNOSIS — R53.83 OTHER FATIGUE: ICD-10-CM

## 2023-03-23 DIAGNOSIS — E55.9 VITAMIN D DEFICIENCY: Primary | ICD-10-CM

## 2023-03-23 PROBLEM — H90.3 BILATERAL SENSORINEURAL HEARING LOSS: Status: ACTIVE | Noted: 2023-03-23

## 2023-03-23 NOTE — PATIENT INSTRUCTIONS
Thank you   Thank you for trusting us with your healthcare needs. You may receive a survey regarding today's visit. It would help us out if you would take a few moments to provide your feedback. We value your input.  Please bring in ALL medications BOTTLES, including inhalers, herbal supplements, over the counter, prescribed & non-prescribed medicine. The office would like actual medication bottles and a list.   Please note our OFFICE POLICIES:   Prior to getting your labs drawn, please check with your insurance company for benefits and eligibility of lab services. Often, insurance companies cover certain tests for preventative visits only. It is patient's responsibility to see what is covered.   We are unable to change a diagnosis after the test has been performed.   Lab orders will not be re-printed. Please hold onto your original lab orders and take them to your lab to be completed.   If you no show your scheduled appointment three times, you will be dismissed from this practice.   Reschedules must be completed 24 hours prior to your schedule appointment.   If the list below has been completed, PLEASE FAX RECORDS TO OUR OFFICE @ [phone]. Once the records have been received we will update your records at our office:  Health Maintenance Due   Topic Date Due    Shingles vaccine (1 of 2) Never done    COVID-19 Vaccine (4 - Booster for Moderna series) 01/15/2022

## 2023-03-23 NOTE — PROGRESS NOTES
eating bananas ripened artificially with ethylene. In the [place], food distribution centers treat unripe bananas and other produce with ethylene to ripen; not commonly done in [facility] since fruit is tree-ripened there. Does treatment of food with ethylene induce banana proteins that cross-react with latex? (Sarai et al.)    References:   Latex in Foods Allergy, http://ehp.niehs.nih.gov/members/2003/5811/5811.html    Search web for [employer] in Season \" for where you live or are at the time you food shop   Management of Latex, ://medicalcenter. [employer].edu/  search for nih, latex-like proteins in foods

## 2023-05-16 ENCOUNTER — OFFICE VISIT (OUTPATIENT)
Dept: FAMILY MEDICINE CLINIC | Age: 78
End: 2023-05-16
Payer: MEDICARE

## 2023-05-16 VITALS
DIASTOLIC BLOOD PRESSURE: 60 MMHG | RESPIRATION RATE: 16 BRPM | HEART RATE: 76 BPM | BODY MASS INDEX: 21.28 KG/M2 | TEMPERATURE: 97.8 F | SYSTOLIC BLOOD PRESSURE: 124 MMHG | WEIGHT: 124 LBS

## 2023-05-16 DIAGNOSIS — R30.0 DYSURIA: Primary | ICD-10-CM

## 2023-05-16 DIAGNOSIS — J01.90 ACUTE BACTERIAL SINUSITIS: ICD-10-CM

## 2023-05-16 DIAGNOSIS — B96.89 ACUTE BACTERIAL SINUSITIS: ICD-10-CM

## 2023-05-16 PROBLEM — J44.9 CHRONIC OBSTRUCTIVE PULMONARY DISEASE, UNSPECIFIED (HCC): Status: ACTIVE | Noted: 2023-05-16

## 2023-05-16 LAB
BILIRUBIN URINE: NEGATIVE
BLOOD URINE, POC: NORMAL
CHARACTER, URINE: CLEAR
COLOR, URINE: YELLOW
GLUCOSE URINE: NEGATIVE MG/DL
KETONES, URINE: NEGATIVE
LEUKOCYTE CLUMPS, URINE: NEGATIVE
NITRITE, URINE: NEGATIVE
PH, URINE: 6 (ref 5–9)
PROTEIN, URINE: NEGATIVE MG/DL
SPECIFIC GRAVITY, URINE: >= 1.03 (ref 1–1.03)
UROBILINOGEN, URINE: 0.2 EU/DL (ref 0–1)

## 2023-05-16 PROCEDURE — 1090F PRES/ABSN URINE INCON ASSESS: CPT | Performed by: NURSE PRACTITIONER

## 2023-05-16 PROCEDURE — 1123F ACP DISCUSS/DSCN MKR DOCD: CPT | Performed by: NURSE PRACTITIONER

## 2023-05-16 PROCEDURE — 1036F TOBACCO NON-USER: CPT | Performed by: NURSE PRACTITIONER

## 2023-05-16 PROCEDURE — G8400 PT W/DXA NO RESULTS DOC: HCPCS | Performed by: NURSE PRACTITIONER

## 2023-05-16 PROCEDURE — G8427 DOCREV CUR MEDS BY ELIG CLIN: HCPCS | Performed by: NURSE PRACTITIONER

## 2023-05-16 PROCEDURE — G8420 CALC BMI NORM PARAMETERS: HCPCS | Performed by: NURSE PRACTITIONER

## 2023-05-16 PROCEDURE — 81003 URINALYSIS AUTO W/O SCOPE: CPT | Performed by: NURSE PRACTITIONER

## 2023-05-16 PROCEDURE — 99213 OFFICE O/P EST LOW 20 MIN: CPT | Performed by: NURSE PRACTITIONER

## 2023-05-16 RX ORDER — IBUPROFEN 200 MG
CAPSULE ORAL
COMMUNITY
Start: 2013-03-13

## 2023-05-16 RX ORDER — SULFAMETHOXAZOLE AND TRIMETHOPRIM 800; 160 MG/1; MG/1
1 TABLET ORAL 2 TIMES DAILY
Qty: 14 TABLET | Refills: 0 | Status: SHIPPED | OUTPATIENT
Start: 2023-05-16 | End: 2023-05-23

## 2023-05-16 NOTE — PROGRESS NOTES
daily Supper and bedtime for better sleep and bones      Omega-3 Fatty Acids (FISH OIL) 1200 MG CAPS Take 4,800 mg by mouth 4 times daily (before meals and nightly) [employer] pharmaceutical grade, caroline versus sherri      vitamin E 400 UNIT capsule Take 1 capsule by mouth daily      vitamin B-6 (PYRIDOXINE) 50 MG tablet Take 1 tablet by mouth daily      Cyanocobalamin (VITAMIN B-12) 3000 MCG SUBL Place 2 tablets under the tongue daily       Biotin 2500 MCG CAPS Take 1 tablet by mouth every other day SKIP 4 DAYS BEFORE LAB TEST      NONFORMULARY Take 1 capsule by mouth daily as needed Orega Resp usually used in October      NONFORMULARY Take 1 capsule by mouth 2 times daily as needed Respiration Blend SP-3 usually used in October      Olive Leaf Extract 250 MG CAPS Take 1 capsule by mouth 2 times daily      Cetirizine HCl 10 MG CAPS Take by mouth nightly       loratadine (CLARITIN) 10 MG capsule Take 1 capsule by mouth as needed       No current facility-administered medications for this visit. Allergies   Allergen Reactions    Gluten Anaphylaxis    Fructose      Other reaction(s): blisters    Gluten Meal Other (See Comments)     Gluten,fruit, and sugar causes blisters of mouth    Orange Oil Other (See Comments)     Any acidic fruit    Pcn [Penicillins] Other (See Comments)     Unknown  Childhood reaction. Sugar-Protein-Starch      Other reaction(s): blisters        Subjective:   Review of Systems    Objective:     Vitals:    05/16/23 1047   BP: 124/60   Site: Right Upper Arm   Pulse: 76   Resp: 16   Temp: 97.8 °F (36.6 °C)   TempSrc: Oral   Weight: 124 lb (56.2 kg)       Body mass index is 21.28 kg/m².     Wt Readings from Last 3 Encounters:   05/16/23 124 lb (56.2 kg)   03/23/23 120 lb 9.6 oz (54.7 kg)   02/23/23 115 lb (52.2 kg)     BP Readings from Last 3 Encounters:   05/16/23 124/60   03/23/23 128/64   03/05/23 (!) 120/94       Physical Exam    Lab Results   Component Value Date    WBC 5.4 03/15/2023    HGB

## 2023-06-29 ENCOUNTER — OFFICE VISIT (OUTPATIENT)
Dept: FAMILY MEDICINE CLINIC | Age: 78
End: 2023-06-29

## 2023-06-29 VITALS
SYSTOLIC BLOOD PRESSURE: 116 MMHG | BODY MASS INDEX: 20.38 KG/M2 | WEIGHT: 119.38 LBS | HEIGHT: 64 IN | DIASTOLIC BLOOD PRESSURE: 70 MMHG

## 2023-06-29 DIAGNOSIS — R39.15 URINARY URGENCY: Primary | ICD-10-CM

## 2023-06-29 DIAGNOSIS — N30.91 CYSTITIS WITH HEMATURIA: ICD-10-CM

## 2023-06-29 LAB
BILIRUBIN URINE: ABNORMAL
BLOOD URINE, POC: ABNORMAL
CHARACTER, URINE: ABNORMAL
COLOR, URINE: ABNORMAL
GLUCOSE URINE: 100 MG/DL
KETONES, URINE: 15
LEUKOCYTE CLUMPS, URINE: ABNORMAL
NITRITE, URINE: POSITIVE
PH, URINE: 7 (ref 5–9)
PROTEIN, URINE: >= 300 MG/DL
SPECIFIC GRAVITY, URINE: 1.02 (ref 1–1.03)
UROBILINOGEN, URINE: 2 EU/DL (ref 0–1)

## 2023-06-29 RX ORDER — CIPROFLOXACIN 500 MG/1
500 TABLET, FILM COATED ORAL 2 TIMES DAILY
Qty: 10 TABLET | Refills: 0 | Status: SHIPPED | OUTPATIENT
Start: 2023-06-29 | End: 2023-07-04

## 2023-06-29 RX ORDER — PHENAZOPYRIDINE HYDROCHLORIDE 200 MG/1
200 TABLET, FILM COATED ORAL 3 TIMES DAILY PRN
Qty: 9 TABLET | Refills: 0 | Status: SHIPPED | OUTPATIENT
Start: 2023-06-29 | End: 2023-07-02

## 2023-07-01 LAB
BACTERIA UR CULT: ABNORMAL
ORGANISM: ABNORMAL

## 2023-07-03 ENCOUNTER — TELEPHONE (OUTPATIENT)
Dept: FAMILY MEDICINE CLINIC | Age: 78
End: 2023-07-03

## 2023-07-03 NOTE — TELEPHONE ENCOUNTER
----- Message from MATTHEW Bright CNP sent at 7/3/2023  9:24 AM EDT -----  Urine shows mixed growth - continue current medication

## 2023-07-05 NOTE — TELEPHONE ENCOUNTER
Spoke to pt and notified her of the urine culture result. She sates she finished the antibiotic and is feeling better. Will call office if symptoms return.

## 2023-08-06 ENCOUNTER — HOSPITAL ENCOUNTER (EMERGENCY)
Age: 78
Discharge: HOME | End: 2023-08-06
Payer: MEDICARE

## 2023-08-06 VITALS
TEMPERATURE: 97.4 F | SYSTOLIC BLOOD PRESSURE: 177 MMHG | HEART RATE: 90 BPM | DIASTOLIC BLOOD PRESSURE: 83 MMHG | OXYGEN SATURATION: 100 % | RESPIRATION RATE: 12 BRPM

## 2023-08-06 DIAGNOSIS — B02.9 HERPES ZOSTER WITHOUT COMPLICATION: Primary | ICD-10-CM

## 2023-08-06 PROCEDURE — 99213 OFFICE O/P EST LOW 20 MIN: CPT

## 2023-08-06 RX ORDER — VALACYCLOVIR HYDROCHLORIDE 1 G/1
1000 TABLET, FILM COATED ORAL 3 TIMES DAILY
Qty: 21 TABLET | Refills: 0 | Status: SHIPPED | OUTPATIENT
Start: 2023-08-06 | End: 2023-08-13

## 2023-08-06 ASSESSMENT — ENCOUNTER SYMPTOMS
ABDOMINAL PAIN: 0
SHORTNESS OF BREATH: 0
EYE DISCHARGE: 0
CHEST TIGHTNESS: 0
NAUSEA: 0
SORE THROAT: 0
SINUS PAIN: 0
EYE ITCHING: 0
TROUBLE SWALLOWING: 0
DIARRHEA: 0
SINUS PRESSURE: 0

## 2023-08-06 ASSESSMENT — PAIN - FUNCTIONAL ASSESSMENT: PAIN_FUNCTIONAL_ASSESSMENT: NONE - DENIES PAIN

## 2023-08-06 NOTE — ED PROVIDER NOTES
Respiration Blend SP-3 usually used in October    NONFORMULARY    Take 1 capsule by mouth 4 times daily (with meals and nightly) Magtein by [employer] for better memory and lower cholesterol    NONFORMULARY    Take 1 capsule by mouth 2 times daily (with meals) BREAKFAST AND LUNCH [facility] at [facility] phosphatidyl serine + bacopa monnieri    NONFORMULARY    Take by mouth multidophilus    NONFORMULARY    Mullein 330 mg 3 times a day    NUTRITIONAL SUPPLEMENTS (DHEA PO)    Take 20 mg by mouth every other day    OLIVE LEAF EXTRACT 250 MG CAPS    Take 1 capsule by mouth 2 times daily    OMEGA-3 FATTY ACIDS (FISH OIL) 1200 MG CAPS    Take 4,800 mg by mouth 4 times daily (before meals and nightly) [employer] pharmaceutical grade, caroline versus sherri    PHENYLEPHRINE-ACETAMINOPHEN 5-325 MG TABS    Take by mouth    POTASSIUM 99 MG TABS    Take 1 capsule by mouth every other day     PSEUDOEPHEDRINE (SUDAFED) 30 MG TABLET    Take 1 tablet by mouth every 4 hours as needed for Congestion    RED YEAST RICE 600 MG CAPS    Take 1 capsule by mouth Daily with supper    SCOPOLAMINE (TRANSDERM-SCOP) TRANSDERMAL PATCH    Place 1 patch onto the skin every 72 hours    TURMERIC CURCUMIN 500 MG CAPS    Take 3 capsules by mouth in the morning, at noon, and at bedtime    VITAMIN B-6 (PYRIDOXINE) 50 MG TABLET    Take 1 tablet by mouth daily    VITAMIN D (CHOLECALCIFEROL) 5000 UNITS CAPS CAPSULE    Take 1 capsule by mouth once a week    VITAMIN E 400 UNIT CAPSULE    Take 1 capsule by mouth daily       ALLERGIES     Patient is is allergic to gluten, fructose, gluten meal, orange oil, pcn [penicillins], and sugar-protein-starch. FAMILY HISTORY     Patient'sfamily history includes Breast Cancer in her sister; Cancer in her mother; Diabetes in her mother; High Blood Pressure in her brother and mother; No Known Problems in her brother, brother, and father. SOCIAL HISTORY     Patient  reports that she has never smoked.  She has never used

## 2023-09-18 ENCOUNTER — NURSE ONLY (OUTPATIENT)
Dept: LAB | Age: 78
End: 2023-09-18

## 2023-09-18 DIAGNOSIS — Z13.29 THYROID DISORDER SCREEN: ICD-10-CM

## 2023-09-18 DIAGNOSIS — K90.89 OTHER INTESTINAL MALABSORPTION: ICD-10-CM

## 2023-09-18 DIAGNOSIS — E78.2 MIXED HYPERLIPIDEMIA: ICD-10-CM

## 2023-09-18 DIAGNOSIS — R73.09 LOW GLUCOSE LEVEL: ICD-10-CM

## 2023-09-18 DIAGNOSIS — E55.9 VITAMIN D DEFICIENCY: ICD-10-CM

## 2023-09-18 DIAGNOSIS — R53.83 OTHER FATIGUE: ICD-10-CM

## 2023-09-18 DIAGNOSIS — Z71.89 ENCOUNTER FOR HERB AND VITAMIN SUPPLEMENT MANAGEMENT: ICD-10-CM

## 2023-09-18 DIAGNOSIS — C50.011 MALIGNANT NEOPLASM OF NIPPLE OF RIGHT BREAST IN FEMALE, UNSPECIFIED ESTROGEN RECEPTOR STATUS (HCC): ICD-10-CM

## 2023-09-18 LAB
25(OH)D3 SERPL-MCNC: 49 NG/ML (ref 30–100)
ANION GAP SERPL CALC-SCNC: 11 MEQ/L (ref 8–16)
BASOPHILS ABSOLUTE: 0 THOU/MM3 (ref 0–0.1)
BASOPHILS NFR BLD AUTO: 0.6 %
BUN SERPL-MCNC: 33 MG/DL (ref 7–22)
CALCIUM SERPL-MCNC: 9.8 MG/DL (ref 8.5–10.5)
CHLORIDE SERPL-SCNC: 99 MEQ/L (ref 98–111)
CHOLEST SERPL-MCNC: 259 MG/DL (ref 100–199)
CO2 SERPL-SCNC: 25 MEQ/L (ref 23–33)
CREAT SERPL-MCNC: 0.8 MG/DL (ref 0.4–1.2)
DEPRECATED MEAN GLUCOSE BLD GHB EST-ACNC: 105 MG/DL (ref 70–126)
DEPRECATED RDW RBC AUTO: 47.9 FL (ref 35–45)
EOSINOPHIL NFR BLD AUTO: 2 %
EOSINOPHILS ABSOLUTE: 0.1 THOU/MM3 (ref 0–0.4)
ERYTHROCYTE [DISTWIDTH] IN BLOOD BY AUTOMATED COUNT: 13.9 % (ref 11.5–14.5)
ERYTHROCYTE [SEDIMENTATION RATE] IN BLOOD BY WESTERGREN METHOD: 11 MM/HR (ref 0–20)
GFR SERPL CREATININE-BSD FRML MDRD: > 60 ML/MIN/1.73M2
GLUCOSE SERPL-MCNC: 84 MG/DL (ref 70–108)
HBA1C MFR BLD HPLC: 5.5 % (ref 4.4–6.4)
HCT VFR BLD AUTO: 40.1 % (ref 37–47)
HDLC SERPL-MCNC: 95 MG/DL
HGB BLD-MCNC: 13.3 GM/DL (ref 12–16)
IMM GRANULOCYTES # BLD AUTO: 0.02 THOU/MM3 (ref 0–0.07)
IMM GRANULOCYTES NFR BLD AUTO: 0.3 %
LDLC SERPL CALC-MCNC: 144 MG/DL
LYMPHOCYTES ABSOLUTE: 2.3 THOU/MM3 (ref 1–4.8)
LYMPHOCYTES NFR BLD AUTO: 34.9 %
MAGNESIUM SERPL-MCNC: 2.2 MG/DL (ref 1.6–2.4)
MCH RBC QN AUTO: 31.2 PG (ref 26–33)
MCHC RBC AUTO-ENTMCNC: 33.2 GM/DL (ref 32.2–35.5)
MCV RBC AUTO: 94.1 FL (ref 81–99)
MONOCYTES ABSOLUTE: 0.5 THOU/MM3 (ref 0.4–1.3)
MONOCYTES NFR BLD AUTO: 7.2 %
NEUTROPHILS NFR BLD AUTO: 55 %
NRBC BLD AUTO-RTO: 0 /100 WBC
PLATELET # BLD AUTO: 255 THOU/MM3 (ref 130–400)
PMV BLD AUTO: 10.3 FL (ref 9.4–12.4)
POTASSIUM SERPL-SCNC: 4.3 MEQ/L (ref 3.5–5.2)
PROGEST SERPL-MCNC: < 0.05 NG/ML
PTH-INTACT SERPL-MCNC: 26.5 PG/ML (ref 15–65)
RBC # BLD AUTO: 4.26 MILL/MM3 (ref 4.2–5.4)
SEGMENTED NEUTROPHILS ABSOLUTE COUNT: 3.6 THOU/MM3 (ref 1.8–7.7)
SODIUM SERPL-SCNC: 135 MEQ/L (ref 135–145)
TRIGL SERPL-MCNC: 98 MG/DL (ref 0–199)
WBC # BLD AUTO: 6.6 THOU/MM3 (ref 4.8–10.8)

## 2023-09-19 LAB
C-REACTIVE PROTEIN, HIGH SENSITIVITY: 0.8 MG/L
HOMOCYSTEINE: 8.6 UMOL/L

## 2023-09-20 LAB
DHEA-S SERPL-MCNC: 250 UG/DL (ref 10–90)
ESTRADIOL LEVEL: < 5 PG/ML
FOLLICLE STIMULATING HORMONE: 69.2 MIU/ML
LUTEINIZING HORMONE: 29.9 MIU/ML (ref 1.7–8.6)

## 2023-09-21 ENCOUNTER — OFFICE VISIT (OUTPATIENT)
Dept: FAMILY MEDICINE CLINIC | Age: 78
End: 2023-09-21
Payer: MEDICARE

## 2023-09-21 VITALS
OXYGEN SATURATION: 95 % | HEART RATE: 85 BPM | RESPIRATION RATE: 10 BRPM | BODY MASS INDEX: 21.03 KG/M2 | TEMPERATURE: 97.7 F | SYSTOLIC BLOOD PRESSURE: 134 MMHG | HEIGHT: 64 IN | DIASTOLIC BLOOD PRESSURE: 62 MMHG | WEIGHT: 123.2 LBS

## 2023-09-21 DIAGNOSIS — R68.89: ICD-10-CM

## 2023-09-21 DIAGNOSIS — E55.9 VITAMIN D DEFICIENCY, UNSPECIFIED: ICD-10-CM

## 2023-09-21 DIAGNOSIS — R73.09 LOW GLUCOSE LEVEL: ICD-10-CM

## 2023-09-21 DIAGNOSIS — J44.9 CHRONIC OBSTRUCTIVE PULMONARY DISEASE, UNSPECIFIED COPD TYPE (HCC): ICD-10-CM

## 2023-09-21 DIAGNOSIS — K90.89 OTHER INTESTINAL MALABSORPTION: ICD-10-CM

## 2023-09-21 DIAGNOSIS — R39.15 URINARY URGENCY: Primary | ICD-10-CM

## 2023-09-21 DIAGNOSIS — Z71.89 ENCOUNTER FOR HERB AND VITAMIN SUPPLEMENT MANAGEMENT: ICD-10-CM

## 2023-09-21 DIAGNOSIS — E78.2 MIXED HYPERLIPIDEMIA: ICD-10-CM

## 2023-09-21 DIAGNOSIS — E55.9 VITAMIN D DEFICIENCY: ICD-10-CM

## 2023-09-21 DIAGNOSIS — R79.9 ABNORMAL FINDING OF BLOOD CHEMISTRY, UNSPECIFIED: ICD-10-CM

## 2023-09-21 DIAGNOSIS — R30.0 DYSURIA: ICD-10-CM

## 2023-09-21 DIAGNOSIS — R42 DIZZINESS: ICD-10-CM

## 2023-09-21 DIAGNOSIS — N30.91 CYSTITIS WITH HEMATURIA: ICD-10-CM

## 2023-09-21 DIAGNOSIS — E78.41 ELEVATED LIPOPROTEIN(A): ICD-10-CM

## 2023-09-21 DIAGNOSIS — C50.011 MALIGNANT NEOPLASM OF NIPPLE OF RIGHT BREAST IN FEMALE, UNSPECIFIED ESTROGEN RECEPTOR STATUS (HCC): ICD-10-CM

## 2023-09-21 DIAGNOSIS — R42 VERTIGO: ICD-10-CM

## 2023-09-21 PROBLEM — M65.332 TRIGGER FINGER, LEFT MIDDLE FINGER: Status: ACTIVE | Noted: 2023-09-21

## 2023-09-21 PROBLEM — M19.042 PRIMARY OSTEOARTHRITIS, LEFT HAND: Status: ACTIVE | Noted: 2023-09-21

## 2023-09-21 PROBLEM — M18.0 ARTHRITIS OF CARPOMETACARPAL (CMC) JOINT OF BOTH THUMBS: Status: ACTIVE | Noted: 2023-09-21

## 2023-09-21 LAB
DHEA SERPL-MCNC: 1.91 NG/ML (ref 0.63–4.7)
TESTOSTERONE, FREE W SHGB, FEMALES/CHILDREN: NORMAL

## 2023-09-21 PROCEDURE — G8427 DOCREV CUR MEDS BY ELIG CLIN: HCPCS | Performed by: FAMILY MEDICINE

## 2023-09-21 PROCEDURE — G8400 PT W/DXA NO RESULTS DOC: HCPCS | Performed by: FAMILY MEDICINE

## 2023-09-21 PROCEDURE — 1123F ACP DISCUSS/DSCN MKR DOCD: CPT | Performed by: FAMILY MEDICINE

## 2023-09-21 PROCEDURE — 1090F PRES/ABSN URINE INCON ASSESS: CPT | Performed by: FAMILY MEDICINE

## 2023-09-21 PROCEDURE — 3023F SPIROM DOC REV: CPT | Performed by: FAMILY MEDICINE

## 2023-09-21 PROCEDURE — G8420 CALC BMI NORM PARAMETERS: HCPCS | Performed by: FAMILY MEDICINE

## 2023-09-21 PROCEDURE — 99214 OFFICE O/P EST MOD 30 MIN: CPT | Performed by: FAMILY MEDICINE

## 2023-09-21 PROCEDURE — 1036F TOBACCO NON-USER: CPT | Performed by: FAMILY MEDICINE

## 2023-09-23 LAB — THYROPEROXIDASE AB SERPL IA-ACNC: < 4 IU/ML (ref 0–25)

## 2023-11-04 ENCOUNTER — HOSPITAL ENCOUNTER (EMERGENCY)
Age: 78
Discharge: HOME | End: 2023-11-04
Payer: MEDICARE

## 2023-11-04 VITALS
BODY MASS INDEX: 21.56 KG/M2 | WEIGHT: 125.6 LBS | OXYGEN SATURATION: 96 % | SYSTOLIC BLOOD PRESSURE: 106 MMHG | DIASTOLIC BLOOD PRESSURE: 61 MMHG | HEART RATE: 83 BPM | TEMPERATURE: 98 F | RESPIRATION RATE: 16 BRPM

## 2023-11-04 DIAGNOSIS — J20.9 ACUTE BRONCHITIS, UNSPECIFIED ORGANISM: Primary | ICD-10-CM

## 2023-11-04 PROCEDURE — 99213 OFFICE O/P EST LOW 20 MIN: CPT

## 2023-11-04 PROCEDURE — 99213 OFFICE O/P EST LOW 20 MIN: CPT | Performed by: NURSE PRACTITIONER

## 2023-11-04 RX ORDER — PREDNISONE 20 MG/1
20 TABLET ORAL DAILY
Qty: 5 TABLET | Refills: 0 | Status: SHIPPED | OUTPATIENT
Start: 2023-11-04 | End: 2023-11-09

## 2023-11-04 ASSESSMENT — ENCOUNTER SYMPTOMS
SHORTNESS OF BREATH: 0
WHEEZING: 0
VOMITING: 0
EYE PAIN: 0
RHINORRHEA: 0
SORE THROAT: 1
ABDOMINAL PAIN: 0
COUGH: 1
CONSTIPATION: 0
NAUSEA: 0
BLOOD IN STOOL: 0
DIARRHEA: 0

## 2023-11-04 ASSESSMENT — PAIN - FUNCTIONAL ASSESSMENT: PAIN_FUNCTIONAL_ASSESSMENT: NONE - DENIES PAIN

## 2023-11-04 NOTE — ED TRIAGE NOTES
Jason Solders arrives to room with complaint of  hoarse, productive cough, green drainage, bilateral ear itching   symptoms started 3 days ago.

## 2023-11-04 NOTE — ED PROVIDER NOTES
[facility]       Chief Complaint   Patient presents with    Cough        Nurses Notes reviewed and I agree except as noted in the HPI. HISTORY OF PRESENT ILLNESS   Rosenda Lowe is a 69 y.o. female who presents to urgent care with complaint of productive cough, hoarse voice and bilateral ear itching that started 3 days. She denies taking any medications for her symptoms. She denies other symptoms including chest pain, shortness of breath, difficulty swallowing or known fever. REVIEW OF SYSTEMS     Review of Systems   Constitutional:  Negative for appetite change, chills, fatigue, fever and unexpected weight change. HENT:  Positive for ear pain and sore throat. Negative for rhinorrhea. Eyes:  Negative for pain and visual disturbance. Respiratory:  Positive for cough. Negative for shortness of breath and wheezing. Cardiovascular:  Negative for chest pain, palpitations and leg swelling. Gastrointestinal:  Negative for abdominal pain, blood in stool, constipation, diarrhea, nausea and vomiting. Genitourinary:  Negative for dysuria, frequency and hematuria. Musculoskeletal:  Negative for arthralgias, joint swelling and neck stiffness. Skin:  Negative for rash. Neurological:  Negative for dizziness, syncope, weakness, light-headedness and headaches. Hematological:  Does not bruise/bleed easily. PAST MEDICAL HISTORY         Diagnosis Date    Cancer [facility])        SURGICAL HISTORY     Patient  has a past surgical history that includes Breast surgery and Finger trigger release (Right, 2017).     CURRENT MEDICATIONS       Previous Medications    ALBUTEROL SULFATE HFA (PROVENTIL;VENTOLIN;PROAIR) 108 (90 BASE) MCG/ACT INHALER    Inhale 2 puffs into the lungs every 4 hours as needed for Wheezing or Shortness of Breath    APPLE CIDER VINEGAR PO    Take by mouth    B COMPLEX-FOLIC ACID (I-281 BALANCED TR PO)    Take 1 tablet by mouth respiratory distress. Breath sounds: Normal breath sounds. No stridor. No decreased breath sounds, wheezing, rhonchi or rales. Chest:      Chest wall: No tenderness. Musculoskeletal:         General: Normal range of motion. Cervical back: Normal range of motion and neck supple. Skin:     General: Skin is warm and dry. Neurological:      Mental Status: She is alert and oriented to person, place, and time. DIAGNOSTIC RESULTS   Labs:No results found for this visit on 11/04/23. IMAGING:  No orders to display     URGENT CARE COURSE:        MDM      Patient presents to urgent care with complaint of productive cough, hoarse voice and bilateral ear itching that started 3 days. Patient symptoms are consistent with a viral bronchitis. We will treat with oral prednisone and Mucinex DM. Patient to follow-up with primary care provider. Patient instructed to go to ER for worsening symptoms, inability to swallow, inability to keep liquids down, inability to urinate for greater than 8 hours or difficulty breathing. Follow-up with your primary care provider. Increase oral intake. Warm salt water gargles after meals and at bedtime to help with sore throat. May take tylenol or ibuprofen as needed for pain or fever. Medications - No data to display  PROCEDURES:    Procedures    FINALIMPRESSION      1.  Acute bronchitis, unspecified organism        DISPOSITION/PLAN   DISPOSITION Decision To Discharge 11/04/2023 09:50:11 AM    PATIENT REFERRED TO:  MATTHEW Martins - [facility]  [address] [phone]    In 2 days      DISCHARGE MEDICATIONS:  New Prescriptions    DEXTROMETHORPHAN-GUAIFENESIN (MUCINEX DM)  MG PER EXTENDED RELEASE TABLET    Take 1 tablet by mouth every 12 hours as needed for Cough or Congestion    PREDNISONE (DELTASONE) 20 MG TABLET    Take 1 tablet by mouth daily for 5 days     Current Discharge Medication List          MATTHEW Tran -

## 2023-11-07 ENCOUNTER — APPOINTMENT (OUTPATIENT)
Dept: GENERAL RADIOLOGY | Age: 78
End: 2023-11-07
Payer: MEDICARE

## 2023-11-07 ENCOUNTER — HOSPITAL ENCOUNTER (EMERGENCY)
Age: 78
Discharge: HOME | End: 2023-11-07
Attending: EMERGENCY MEDICINE
Payer: MEDICARE

## 2023-11-07 VITALS
RESPIRATION RATE: 20 BRPM | OXYGEN SATURATION: 93 % | TEMPERATURE: 98.2 F | DIASTOLIC BLOOD PRESSURE: 55 MMHG | SYSTOLIC BLOOD PRESSURE: 116 MMHG | HEART RATE: 84 BPM

## 2023-11-07 DIAGNOSIS — R05.1 ACUTE COUGH: ICD-10-CM

## 2023-11-07 DIAGNOSIS — J06.9 ACUTE UPPER RESPIRATORY INFECTION: Primary | ICD-10-CM

## 2023-11-07 LAB
ALBUMIN SERPL BCG-MCNC: 3.7 G/DL (ref 3.5–5.1)
ALP SERPL-CCNC: 63 U/L (ref 38–126)
ALT SERPL W/O P-5'-P-CCNC: 17 U/L (ref 11–66)
ANION GAP SERPL CALC-SCNC: 15 MEQ/L (ref 8–16)
AST SERPL-CCNC: 28 U/L (ref 5–40)
BACTERIA URNS QL MICRO: ABNORMAL /HPF
BASOPHILS ABSOLUTE: 0 THOU/MM3 (ref 0–0.1)
BASOPHILS NFR BLD AUTO: 0.2 %
BILIRUB SERPL-MCNC: 0.3 MG/DL (ref 0.3–1.2)
BILIRUB UR QL STRIP.AUTO: NEGATIVE
BUN SERPL-MCNC: 21 MG/DL (ref 7–22)
CALCIUM SERPL-MCNC: 9.3 MG/DL (ref 8.5–10.5)
CASTS #/AREA URNS LPF: ABNORMAL /LPF
CHARACTER UR: CLEAR
CHLORIDE SERPL-SCNC: 97 MEQ/L (ref 98–111)
CO2 SERPL-SCNC: 22 MEQ/L (ref 23–33)
COLOR: YELLOW
CREAT SERPL-MCNC: 0.6 MG/DL (ref 0.4–1.2)
CRYSTALS URNS MICRO: ABNORMAL
DEPRECATED RDW RBC AUTO: 46 FL (ref 35–45)
EKG ATRIAL RATE: 72 BPM
EKG P AXIS: 59 DEGREES
EKG P-R INTERVAL: 124 MS
EKG Q-T INTERVAL: 392 MS
EKG QRS DURATION: 76 MS
EKG QTC CALCULATION (BAZETT): 429 MS
EKG R AXIS: 83 DEGREES
EKG T AXIS: 68 DEGREES
EKG VENTRICULAR RATE: 72 BPM
EOSINOPHIL NFR BLD AUTO: 0.2 %
EOSINOPHILS ABSOLUTE: 0 THOU/MM3 (ref 0–0.4)
EPITHELIAL CELLS, UA: ABNORMAL /HPF
ERYTHROCYTE [DISTWIDTH] IN BLOOD BY AUTOMATED COUNT: 13.4 % (ref 11.5–14.5)
FLUAV RNA RESP QL NAA+PROBE: NOT DETECTED
FLUBV RNA RESP QL NAA+PROBE: NOT DETECTED
GFR SERPL CREATININE-BSD FRML MDRD: > 60 ML/MIN/1.73M2
GLUCOSE SERPL-MCNC: 114 MG/DL (ref 70–108)
GLUCOSE UR QL STRIP.AUTO: NEGATIVE MG/DL
HCT VFR BLD AUTO: 36.5 % (ref 37–47)
HGB BLD-MCNC: 11.9 GM/DL (ref 12–16)
HGB UR QL STRIP.AUTO: ABNORMAL
IMM GRANULOCYTES # BLD AUTO: 0.04 THOU/MM3 (ref 0–0.07)
IMM GRANULOCYTES NFR BLD AUTO: 0.3 %
KETONES UR QL STRIP.AUTO: NEGATIVE
LYMPHOCYTES ABSOLUTE: 1.4 THOU/MM3 (ref 1–4.8)
LYMPHOCYTES NFR BLD AUTO: 11.2 %
MAGNESIUM SERPL-MCNC: 2 MG/DL (ref 1.6–2.4)
MCH RBC QN AUTO: 30.3 PG (ref 26–33)
MCHC RBC AUTO-ENTMCNC: 32.6 GM/DL (ref 32.2–35.5)
MCV RBC AUTO: 92.9 FL (ref 81–99)
MONOCYTES ABSOLUTE: 0.6 THOU/MM3 (ref 0.4–1.3)
MONOCYTES NFR BLD AUTO: 4.7 %
NEUTROPHILS NFR BLD AUTO: 83.4 %
NITRITE UR QL STRIP: NEGATIVE
NRBC BLD AUTO-RTO: 0 /100 WBC
OSMOLALITY SERPL CALC.SUM OF ELEC: 272.1 MOSMOL/KG (ref 275–300)
PH UR STRIP.AUTO: 6 [PH] (ref 5–9)
PLATELET # BLD AUTO: 283 THOU/MM3 (ref 130–400)
PMV BLD AUTO: 10.1 FL (ref 9.4–12.4)
POTASSIUM SERPL-SCNC: 4.6 MEQ/L (ref 3.5–5.2)
PROT SERPL-MCNC: 7 G/DL (ref 6.1–8)
PROT UR STRIP.AUTO-MCNC: NEGATIVE MG/DL
RBC # BLD AUTO: 3.93 MILL/MM3 (ref 4.2–5.4)
RBC URINE: ABNORMAL /HPF
S PYO AG THROAT QL: NEGATIVE
S PYO THROAT QL CULT: NORMAL
SARS-COV-2 RNA RESP QL NAA+PROBE: NOT DETECTED
SEGMENTED NEUTROPHILS ABSOLUTE COUNT: 10.1 THOU/MM3 (ref 1.8–7.7)
SODIUM SERPL-SCNC: 134 MEQ/L (ref 135–145)
SP GR UR REFRACT.AUTO: 1.01 (ref 1–1.03)
UROBILINOGEN, URINE: 0.2 EU/DL (ref 0–1)
WBC # BLD AUTO: 12.1 THOU/MM3 (ref 4.8–10.8)
WBC #/AREA URNS HPF: ABNORMAL /HPF
WBC #/AREA URNS HPF: ABNORMAL /[HPF]

## 2023-11-07 PROCEDURE — 81001 URINALYSIS AUTO W/SCOPE: CPT

## 2023-11-07 PROCEDURE — 71046 X-RAY EXAM CHEST 2 VIEWS: CPT

## 2023-11-07 PROCEDURE — 83735 ASSAY OF MAGNESIUM: CPT

## 2023-11-07 PROCEDURE — 87636 SARSCOV2 & INF A&B AMP PRB: CPT

## 2023-11-07 PROCEDURE — 87070 CULTURE OTHR SPECIMN AEROBIC: CPT

## 2023-11-07 PROCEDURE — 99285 EMERGENCY DEPT VISIT HI MDM: CPT

## 2023-11-07 PROCEDURE — 80053 COMPREHEN METABOLIC PANEL: CPT

## 2023-11-07 PROCEDURE — 87880 STREP A ASSAY W/OPTIC: CPT

## 2023-11-07 PROCEDURE — 6370000000 HC RX 637 (ALT 250 FOR IP): Performed by: EMERGENCY MEDICINE

## 2023-11-07 PROCEDURE — 85025 COMPLETE CBC W/AUTO DIFF WBC: CPT

## 2023-11-07 PROCEDURE — 93010 ELECTROCARDIOGRAM REPORT: CPT | Performed by: INTERNAL MEDICINE

## 2023-11-07 PROCEDURE — 93005 ELECTROCARDIOGRAM TRACING: CPT | Performed by: EMERGENCY MEDICINE

## 2023-11-07 PROCEDURE — 36415 COLL VENOUS BLD VENIPUNCTURE: CPT

## 2023-11-07 PROCEDURE — 94640 AIRWAY INHALATION TREATMENT: CPT

## 2023-11-07 RX ORDER — DOXYCYCLINE HYCLATE 100 MG
100 TABLET ORAL ONCE
Status: COMPLETED | OUTPATIENT
Start: 2023-11-07 | End: 2023-11-07

## 2023-11-07 RX ORDER — ALBUTEROL SULFATE 90 UG/1
2 AEROSOL, METERED RESPIRATORY (INHALATION) 4 TIMES DAILY PRN
Qty: 18 G | Refills: 0 | Status: SHIPPED | OUTPATIENT
Start: 2023-11-07

## 2023-11-07 RX ORDER — DOXYCYCLINE HYCLATE 100 MG
100 TABLET ORAL 2 TIMES DAILY
Qty: 10 TABLET | Refills: 0 | Status: SHIPPED | OUTPATIENT
Start: 2023-11-07 | End: 2023-11-12

## 2023-11-07 RX ORDER — IPRATROPIUM BROMIDE AND ALBUTEROL SULFATE 2.5; .5 MG/3ML; MG/3ML
1 SOLUTION RESPIRATORY (INHALATION) ONCE
Status: COMPLETED | OUTPATIENT
Start: 2023-11-07 | End: 2023-11-07

## 2023-11-07 RX ADMIN — DOXYCYCLINE HYCLATE 100 MG: 100 TABLET, COATED ORAL at 14:10

## 2023-11-07 RX ADMIN — IPRATROPIUM BROMIDE AND ALBUTEROL SULFATE 1 DOSE: .5; 3 SOLUTION RESPIRATORY (INHALATION) at 13:48

## 2023-11-07 ASSESSMENT — PAIN - FUNCTIONAL ASSESSMENT
PAIN_FUNCTIONAL_ASSESSMENT: NONE - DENIES PAIN
PAIN_FUNCTIONAL_ASSESSMENT: NONE - DENIES PAIN

## 2023-11-07 NOTE — CARE COORDINATION
Spoke with patient who declined RPM program at this time. Educated if changed mind could contact PCP and they could get in contact with care team. Denied concerns or needs.

## 2023-11-07 NOTE — ED NOTES
Pt resting in bed. Respirations even and unlabored. Pt medicated per MAR. Updated on plan of care. No needs expressed at this time. Bed lowest position, call light in reach, side rails x2.       Bonita Lowery RN  11/07/23 1157

## 2023-11-07 NOTE — ED TRIAGE NOTES
Pt to the ED for dizziness. Pt states she has been had a productive cough and sore throat for a few days. Pt was given cough meds and steroids. Pt states today she started getting dizzy when she stood up to walk. Pt states this happens every year when the weather changes.

## 2023-11-07 NOTE — ED PROVIDER NOTES
[facility] ENCOUNTER          Pt Name: Raysa Sun  MRN: [id number]  [address] 1945  Date of evaluation: 11/7/2023    CHIEF COMPLAINT       Chief Complaint   Patient presents with    Cough    Pharyngitis    Dizziness       Nurses Notes reviewed and I agree except as noted in the HPI. HISTORY OF PRESENT ILLNESS    Raysa Sun is a 69 y.o. pleasant female who presents to the emergency department for evaluation of, sore throat, postnasal drip, dizziness, stuffy ears. Patient states that she was feeling unwell for a few days. She went to urgent care recently and was given steroids and a medication for her cough. However she states she feels that her symptoms have not improved. She states nearly every year with weather change she gets cough, stuffy ears and feels dizzy. Today she was at work and felt dizzy when she was standing or moving around. This is also happened to her several times in the past, states that she has been evaluated for dizziness and prescribed Antivert. She did not take any Antivert today for her dizziness. She also reports loss of her voice and sore throat. No fever, nausea, vomiting, diarrhea, headache or body aches. She occasionally has coughed up sputum but primarily cough has been dry. She used to be on albuterol but has not used her inhaler in quite some time. She also used to be on Advair but she has not taken that in months either. She also reports postnasal drip. The patient has no other acute complaints at this time. REVIEW OF SYSTEMS   Review of Systems    PAST MEDICAL AND SURGICAL HISTORY     Past Medical History:   Diagnosis Date    Cancer [facility])      Past Surgical History:   Procedure Laterality Date    BREAST SURGERY      FINGER TRIGGER RELEASE Right 2017       CURRENT MEDICATIONS   No current facility-administered medications for this encounter.     Current Outpatient Medications:     doxycycline

## 2023-11-10 LAB — BACTERIA THROAT AEROBE CULT: NORMAL

## 2023-11-13 ENCOUNTER — OFFICE VISIT (OUTPATIENT)
Dept: FAMILY MEDICINE CLINIC | Age: 78
End: 2023-11-13
Payer: MEDICARE

## 2023-11-13 VITALS
DIASTOLIC BLOOD PRESSURE: 84 MMHG | HEART RATE: 88 BPM | BODY MASS INDEX: 20.94 KG/M2 | RESPIRATION RATE: 16 BRPM | WEIGHT: 122 LBS | SYSTOLIC BLOOD PRESSURE: 124 MMHG

## 2023-11-13 DIAGNOSIS — H57.11 ACUTE RIGHT EYE PAIN: Primary | ICD-10-CM

## 2023-11-13 PROCEDURE — 1036F TOBACCO NON-USER: CPT | Performed by: NURSE PRACTITIONER

## 2023-11-13 PROCEDURE — 99213 OFFICE O/P EST LOW 20 MIN: CPT | Performed by: NURSE PRACTITIONER

## 2023-11-13 PROCEDURE — 1090F PRES/ABSN URINE INCON ASSESS: CPT | Performed by: NURSE PRACTITIONER

## 2023-11-13 PROCEDURE — G8427 DOCREV CUR MEDS BY ELIG CLIN: HCPCS | Performed by: NURSE PRACTITIONER

## 2023-11-13 PROCEDURE — G8484 FLU IMMUNIZE NO ADMIN: HCPCS | Performed by: NURSE PRACTITIONER

## 2023-11-13 PROCEDURE — 1123F ACP DISCUSS/DSCN MKR DOCD: CPT | Performed by: NURSE PRACTITIONER

## 2023-11-13 PROCEDURE — G8420 CALC BMI NORM PARAMETERS: HCPCS | Performed by: NURSE PRACTITIONER

## 2023-11-13 PROCEDURE — G8400 PT W/DXA NO RESULTS DOC: HCPCS | Performed by: NURSE PRACTITIONER

## 2023-11-13 RX ORDER — POLYMYXIN B SULFATE AND TRIMETHOPRIM 1; 10000 MG/ML; [USP'U]/ML
1 SOLUTION OPHTHALMIC EVERY 4 HOURS
Qty: 3 ML | Refills: 0 | Status: SHIPPED | OUTPATIENT
Start: 2023-11-13 | End: 2023-11-23

## 2023-11-15 ASSESSMENT — ENCOUNTER SYMPTOMS
VOMITING: 0
CONSTIPATION: 0
BLOOD IN STOOL: 0
DIARRHEA: 0
NAUSEA: 0
EYE PAIN: 1

## 2023-11-16 ENCOUNTER — OFFICE VISIT (OUTPATIENT)
Dept: FAMILY MEDICINE CLINIC | Age: 78
End: 2023-11-16

## 2023-11-16 VITALS
TEMPERATURE: 97.5 F | WEIGHT: 122 LBS | BODY MASS INDEX: 20.83 KG/M2 | DIASTOLIC BLOOD PRESSURE: 64 MMHG | SYSTOLIC BLOOD PRESSURE: 126 MMHG | HEART RATE: 80 BPM | HEIGHT: 64 IN | RESPIRATION RATE: 16 BRPM

## 2023-11-16 DIAGNOSIS — Z00.00 MEDICARE ANNUAL WELLNESS VISIT, SUBSEQUENT: Primary | ICD-10-CM

## 2023-11-16 DIAGNOSIS — Z23 NEED FOR SHINGLES VACCINE: ICD-10-CM

## 2023-11-16 DIAGNOSIS — H57.11 ACUTE RIGHT EYE PAIN: ICD-10-CM

## 2023-11-16 DIAGNOSIS — Z78.0 POST-MENOPAUSE: ICD-10-CM

## 2023-11-16 DIAGNOSIS — J44.9 CHRONIC OBSTRUCTIVE PULMONARY DISEASE, UNSPECIFIED COPD TYPE (HCC): ICD-10-CM

## 2023-11-16 RX ORDER — ZOSTER VACCINE RECOMBINANT, ADJUVANTED 50 MCG/0.5
0.5 KIT INTRAMUSCULAR SEE ADMIN INSTRUCTIONS
Qty: 0.5 ML | Refills: 0 | Status: SHIPPED | OUTPATIENT
Start: 2023-11-16 | End: 2024-05-14

## 2023-11-16 ASSESSMENT — PATIENT HEALTH QUESTIONNAIRE - PHQ9
SUM OF ALL RESPONSES TO PHQ QUESTIONS 1-9: 0
SUM OF ALL RESPONSES TO PHQ QUESTIONS 1-9: 0
SUM OF ALL RESPONSES TO PHQ9 QUESTIONS 1 & 2: 0
SUM OF ALL RESPONSES TO PHQ QUESTIONS 1-9: 0
SUM OF ALL RESPONSES TO PHQ QUESTIONS 1-9: 0
1. LITTLE INTEREST OR PLEASURE IN DOING THINGS: 0
2. FEELING DOWN, DEPRESSED OR HOPELESS: 0

## 2023-11-16 NOTE — PROGRESS NOTES
3 MG TABS tablet Take 5 mg by mouth nightly as needed Yes Rosangela Wright MD   Garlic 736 MG CAPS Take 1 capsule by mouth 2 times daily Yes Rosangela Wright MD   Calcium Carbonate-Vit D-Min (CALCIUM 1200 PO) Take 1 tablet by mouth nightly bedtime for better sleep and bones Yes Rosangela Wright MD   Omega-3 Fatty Acids (FISH OIL) 1000 MG capsule Take 4 capsules by mouth 4 times daily (before meals and nightly) [employer] pharmaceutical grade, cashews versus pecans Yes Rosangela Wright MD   vitamin E 400 UNIT capsule Take 1 capsule by mouth daily Yes Rosangela Wright MD   vitamin B-6 (PYRIDOXINE) 50 MG tablet Take 1 tablet by mouth daily Yes Rosangela Wright MD   Cyanocobalamin (VITAMIN B-12) 3000 MCG SUBL Place 2 tablets under the tongue daily  Yes Rosangela Wright MD   Biotin 2500 MCG CAPS Take 1 tablet by mouth every other day SKIP 4 DAYS BEFORE LAB TEST Yes Rosangela Wright MD   NONFORMULARY Take 1 capsule by mouth daily as needed [place] Resp usually used in October Yes Rosangela Wright MD   NONFORMULARY Take 1 capsule by mouth 2 times daily as needed Respiration Blend SP-3 usually used in October Yes Rosangela Wright MD   Olive Leaf Extract 250 MG CAPS Take 1 capsule by mouth 2 times daily Yes Rosangela Wright MD   Cetirizine HCl 10 MG CAPS Take by mouth nightly  Yes Rosangela Wright MD   loratadine (CLARITIN) 10 MG capsule Take 1 capsule by mouth as needed Yes Rosangela Wright MD   NONFORMULARY Take by mouth multidophilus  Patient not taking: Reported on 9/21/2023  Provider, Historical, MD     CareTeam (Including outside providers/suppliers regularly involved in providing care):   Patient Care Team:  MATTHEW Sevilla CNP as PCP - General (Certified Nurse Practitioner)  MATTHEW Sevilla CNP as PCP - Empaneled Provider     Reviewed and updated this visit:  Tobacco  Allergies  Meds  Problems  Med Hx  Surg Hx  Soc Hx  Fam Hx

## 2023-11-16 NOTE — PATIENT INSTRUCTIONS
Learning About Vision Tests  What are vision tests? The four most common vision tests are visual acuity tests, refraction, visual field tests, and color vision tests. Visual acuity (sharpness) tests  These tests are used: To see if you need glasses or contact lenses. To monitor an eye problem. To check an eye injury. Visual acuity tests are done as part of routine exams. You may also have this test when you get your 's license or apply for some types of jobs. Visual field tests  These tests are used: To check for vision loss in any area of your range of vision. To screen for certain eye diseases. To look for nerve damage after a stroke, head injury, or other problem that could reduce blood flow to the brain. Refraction and color tests  A refraction test is done to find the right prescription for glasses and contact lenses. A color vision test is done to check for color blindness. Color vision is often tested as part of a routine exam. You may also have this test when you apply for a job where recognizing different colors is important, such as , electronics, or the [employer]. How are vision tests done? Visual acuity test   You cover one eye at a time. You read aloud from a wall chart across the room. You read aloud from a small card that you hold in your hand. Refraction   You look into a special device. The device puts lenses of different strengths in front of each eye to see how strong your glasses or contact lenses need to be. Visual field tests   Your doctor may have you look through special machines. Or your doctor may simply have you stare straight ahead while they move a finger into and out of your field of vision. Color vision test   You look at pieces of printed test patterns in various colors. You say what number or symbol you see. Your doctor may have you trace the number or symbol using a pointer. How do these tests feel?   There is very little chance of

## 2023-11-19 LAB
EKG ATRIAL RATE: 72 BPM
EKG P AXIS: 59 DEGREES
EKG P-R INTERVAL: 124 MS
EKG Q-T INTERVAL: 392 MS
EKG QRS DURATION: 76 MS
EKG QTC CALCULATION (BAZETT): 429 MS
EKG R AXIS: 83 DEGREES
EKG T AXIS: 68 DEGREES
EKG VENTRICULAR RATE: 72 BPM

## 2024-01-04 ENCOUNTER — TELEPHONE (OUTPATIENT)
Dept: FAMILY MEDICINE CLINIC | Age: 79
End: 2024-01-04

## 2024-01-04 NOTE — TELEPHONE ENCOUNTER
----- Message from MATTHEW Justice CNP sent at 1/2/2024  4:56 PM EST -----  DEXA:  IMPRESSION:     1.  The patient demonstrates osteopenia of both hips.     2.  The patient demonstrates osteopenia of the lumbar spine.      Bone density showed Osteopenia which place her at medium risk for fractures.  Recommend to take 1200 mg of calcium a day (half from diet) and 2000 IU of vitamin D.  Walk daily for at least 20 minutes.  Will repeat in 2 years

## 2024-04-09 ENCOUNTER — OFFICE VISIT (OUTPATIENT)
Dept: FAMILY MEDICINE CLINIC | Age: 79
End: 2024-04-09
Payer: MEDICARE

## 2024-04-09 VITALS
BODY MASS INDEX: 21.24 KG/M2 | RESPIRATION RATE: 16 BRPM | HEIGHT: 64 IN | SYSTOLIC BLOOD PRESSURE: 122 MMHG | WEIGHT: 124.38 LBS | HEART RATE: 77 BPM | DIASTOLIC BLOOD PRESSURE: 80 MMHG

## 2024-04-09 DIAGNOSIS — J44.9 CHRONIC OBSTRUCTIVE PULMONARY DISEASE, UNSPECIFIED COPD TYPE (HCC): ICD-10-CM

## 2024-04-09 DIAGNOSIS — C50.011 MALIGNANT NEOPLASM OF NIPPLE OF RIGHT BREAST IN FEMALE, UNSPECIFIED ESTROGEN RECEPTOR STATUS (HCC): ICD-10-CM

## 2024-04-09 DIAGNOSIS — J06.9 ACUTE UPPER RESPIRATORY INFECTION, UNSPECIFIED: Primary | ICD-10-CM

## 2024-04-09 PROCEDURE — 1090F PRES/ABSN URINE INCON ASSESS: CPT | Performed by: NURSE PRACTITIONER

## 2024-04-09 PROCEDURE — 1123F ACP DISCUSS/DSCN MKR DOCD: CPT | Performed by: NURSE PRACTITIONER

## 2024-04-09 PROCEDURE — 3023F SPIROM DOC REV: CPT | Performed by: NURSE PRACTITIONER

## 2024-04-09 PROCEDURE — G8420 CALC BMI NORM PARAMETERS: HCPCS | Performed by: NURSE PRACTITIONER

## 2024-04-09 PROCEDURE — G8427 DOCREV CUR MEDS BY ELIG CLIN: HCPCS | Performed by: NURSE PRACTITIONER

## 2024-04-09 PROCEDURE — 1036F TOBACCO NON-USER: CPT | Performed by: NURSE PRACTITIONER

## 2024-04-09 PROCEDURE — 99213 OFFICE O/P EST LOW 20 MIN: CPT | Performed by: NURSE PRACTITIONER

## 2024-04-09 PROCEDURE — G8400 PT W/DXA NO RESULTS DOC: HCPCS | Performed by: NURSE PRACTITIONER

## 2024-04-09 RX ORDER — DOXYCYCLINE HYCLATE 100 MG
100 TABLET ORAL 2 TIMES DAILY
Qty: 10 TABLET | Refills: 0 | Status: SHIPPED | OUTPATIENT
Start: 2024-04-09 | End: 2024-04-14

## 2024-04-09 SDOH — ECONOMIC STABILITY: FOOD INSECURITY: WITHIN THE PAST 12 MONTHS, YOU WORRIED THAT YOUR FOOD WOULD RUN OUT BEFORE YOU GOT MONEY TO BUY MORE.: NEVER TRUE

## 2024-04-09 SDOH — ECONOMIC STABILITY: FOOD INSECURITY: WITHIN THE PAST 12 MONTHS, THE FOOD YOU BOUGHT JUST DIDN'T LAST AND YOU DIDN'T HAVE MONEY TO GET MORE.: NEVER TRUE

## 2024-04-09 SDOH — ECONOMIC STABILITY: INCOME INSECURITY: HOW HARD IS IT FOR YOU TO PAY FOR THE VERY BASICS LIKE FOOD, HOUSING, MEDICAL CARE, AND HEATING?: NOT HARD AT ALL

## 2024-04-09 ASSESSMENT — PATIENT HEALTH QUESTIONNAIRE - PHQ9
SUM OF ALL RESPONSES TO PHQ QUESTIONS 1-9: 0
SUM OF ALL RESPONSES TO PHQ QUESTIONS 1-9: 0
SUM OF ALL RESPONSES TO PHQ9 QUESTIONS 1 & 2: 0
1. LITTLE INTEREST OR PLEASURE IN DOING THINGS: NOT AT ALL
2. FEELING DOWN, DEPRESSED OR HOPELESS: NOT AT ALL
SUM OF ALL RESPONSES TO PHQ QUESTIONS 1-9: 0
SUM OF ALL RESPONSES TO PHQ QUESTIONS 1-9: 0

## 2024-05-01 ENCOUNTER — OFFICE VISIT (OUTPATIENT)
Dept: FAMILY MEDICINE CLINIC | Age: 79
End: 2024-05-01

## 2024-05-01 VITALS
RESPIRATION RATE: 16 BRPM | SYSTOLIC BLOOD PRESSURE: 120 MMHG | DIASTOLIC BLOOD PRESSURE: 70 MMHG | BODY MASS INDEX: 21.97 KG/M2 | WEIGHT: 128 LBS | HEART RATE: 72 BPM | TEMPERATURE: 98.3 F

## 2024-05-01 DIAGNOSIS — R39.15 URINARY URGENCY: ICD-10-CM

## 2024-05-01 DIAGNOSIS — M25.562 ACUTE PAIN OF LEFT KNEE: ICD-10-CM

## 2024-05-01 DIAGNOSIS — R30.0 DYSURIA: Primary | ICD-10-CM

## 2024-05-01 LAB
BILIRUBIN, URINE: NEGATIVE
BLOOD URINE, POC: ABNORMAL
CHARACTER, URINE: CLEAR
COLOR: YELLOW
GLUCOSE URINE: NEGATIVE MG/DL
KETONES, URINE: NEGATIVE
LEUKOCYTE CLUMPS, URINE: ABNORMAL
NITRITE, URINE: NEGATIVE
PH, URINE: 6.5 (ref 5–9)
PROTEIN, URINE: 30 MG/DL
SPECIFIC GRAVITY UA: 1.02 (ref 1–1.03)
UROBILINOGEN, URINE: 0.2 EU/DL (ref 0–1)

## 2024-05-01 RX ORDER — PHENAZOPYRIDINE HYDROCHLORIDE 100 MG/1
100 TABLET, FILM COATED ORAL 3 TIMES DAILY PRN
Qty: 9 TABLET | Refills: 0 | Status: SHIPPED | OUTPATIENT
Start: 2024-05-01 | End: 2024-05-04

## 2024-05-01 RX ORDER — CIPROFLOXACIN 500 MG/1
500 TABLET, FILM COATED ORAL 2 TIMES DAILY
Qty: 14 TABLET | Refills: 0 | Status: SHIPPED | OUTPATIENT
Start: 2024-05-01 | End: 2024-05-08

## 2024-05-01 ASSESSMENT — ENCOUNTER SYMPTOMS
ANAL BLEEDING: 0
CONSTIPATION: 0
NAUSEA: 0
COUGH: 0
COLOR CHANGE: 0
RHINORRHEA: 0
EYE REDNESS: 0
ABDOMINAL DISTENTION: 0
ABDOMINAL PAIN: 0
DIARRHEA: 0
SORE THROAT: 0
EYE DISCHARGE: 0
SHORTNESS OF BREATH: 0
BLOOD IN STOOL: 0

## 2024-05-01 NOTE — PROGRESS NOTES
[facility]  [facility]  [address]  [facility] [address]  Dept: [phone]  Loc: [phone]    Visit Date: 5/1/2024    Jenifer Gonzales a 79 y.o. female who presents today for:   Chief Complaint   Patient presents with    Urinary Frequency     Pt here for urinary urgency,frequency and dysuria, order and cloudy urine, about a week     HPI:     1 week for urinary urgency, frequency, pain with urination. Urine odor and discoloration (cloudy)     Fell about 6 weeks ago - landed on left knee - has had pain since then - slipped on wet concrete - hurts to kneel on the knee   HPI  Health Maintenance   Topic Date Due    Shingles vaccine (1 of 2) Never done    Respiratory Syncytial Virus (RSV) Pregnant or age 60 yrs+ (1 - 1-dose 60+ series) Never done    COVID-19 Vaccine (6 - 2023-24 season) 09/01/2023    Annual Wellness Visit (Medicare)  11/16/2024    Depression Screen  04/09/2025    DTaP/Tdap/Td vaccine (2 - Td or Tdap) 10/12/2030    DEXA (modify frequency per FRAX score)  Completed    Flu vaccine  Completed    Pneumococcal 65+ years Vaccine  Completed    Hepatitis C screen  Completed    Hepatitis A vaccine  Aged Out    Hepatitis B vaccine  Aged Out    Hib vaccine  Aged Out    Polio vaccine  Aged Out    Meningococcal (ACWY) vaccine  Aged Out    A1C test (Diabetic or Prediabetic)  Discontinued    Lipids  Discontinued    Breast cancer screen  Discontinued    Colonoscopy  Discontinued       Current Outpatient Medications   Medication Sig Dispense Refill    ciprofloxacin (CIPRO) 500 MG tablet Take 1 tablet by mouth 2 times daily for 7 days 14 tablet 0    phenazopyridine (PYRIDIUM) 100 MG tablet Take 1 tablet by mouth 3 times daily as needed for Pain 9 tablet 0    albuterol sulfate HFA (VENTOLIN HFA) 108 (90 Base) MCG/ACT inhaler Inhale 2 puffs into the lungs 4 times daily as needed for Wheezing 18 g 0    fluticasone (FLONASE) 50 MCG/ACT nasal spray 1 spray by Each Nostril

## 2024-05-01 NOTE — PATIENT INSTRUCTIONS
Will give Cipro - take twice daily for 7 days  Will give pyridium for bladder spasms - take as needed up to 3 times daily  Drink plenty of water - half of your bodyweight in ounces daily (100 pound person would drink 50 oz)  Avoid caffeine - tea, soda, chocolate  Do not hold your bladder  Wipe from front to back after unination  Urinate after intercourse  Will send the urine for culture    Xray ordered

## 2024-05-02 LAB
BACTERIA UR CULT: ABNORMAL
ORGANISM: ABNORMAL

## 2024-05-03 ENCOUNTER — HOSPITAL ENCOUNTER (OUTPATIENT)
Dept: GENERAL RADIOLOGY | Age: 79
Discharge: HOME | End: 2024-05-03
Payer: MEDICARE

## 2024-05-03 ENCOUNTER — HOSPITAL ENCOUNTER (OUTPATIENT)
Age: 79
Discharge: HOME | End: 2024-05-03
Payer: MEDICARE

## 2024-05-03 ENCOUNTER — NURSE ONLY (OUTPATIENT)
Dept: LAB | Age: 79
End: 2024-05-03

## 2024-05-03 DIAGNOSIS — R73.09 LOW GLUCOSE LEVEL: ICD-10-CM

## 2024-05-03 DIAGNOSIS — K90.89 OTHER INTESTINAL MALABSORPTION: ICD-10-CM

## 2024-05-03 DIAGNOSIS — E55.9 VITAMIN D DEFICIENCY: ICD-10-CM

## 2024-05-03 DIAGNOSIS — J44.9 CHRONIC OBSTRUCTIVE PULMONARY DISEASE, UNSPECIFIED COPD TYPE (HCC): ICD-10-CM

## 2024-05-03 DIAGNOSIS — N30.91 CYSTITIS WITH HEMATURIA: ICD-10-CM

## 2024-05-03 DIAGNOSIS — C50.011 MALIGNANT NEOPLASM OF NIPPLE OF RIGHT BREAST IN FEMALE, UNSPECIFIED ESTROGEN RECEPTOR STATUS (HCC): ICD-10-CM

## 2024-05-03 DIAGNOSIS — E55.9 VITAMIN D DEFICIENCY, UNSPECIFIED: ICD-10-CM

## 2024-05-03 DIAGNOSIS — E78.41 ELEVATED LIPOPROTEIN(A): ICD-10-CM

## 2024-05-03 DIAGNOSIS — Z71.89 ENCOUNTER FOR HERB AND VITAMIN SUPPLEMENT MANAGEMENT: ICD-10-CM

## 2024-05-03 DIAGNOSIS — R68.89: ICD-10-CM

## 2024-05-03 DIAGNOSIS — R79.9 ABNORMAL FINDING OF BLOOD CHEMISTRY, UNSPECIFIED: ICD-10-CM

## 2024-05-03 DIAGNOSIS — E78.2 MIXED HYPERLIPIDEMIA: ICD-10-CM

## 2024-05-03 DIAGNOSIS — R42 VERTIGO: ICD-10-CM

## 2024-05-03 DIAGNOSIS — M25.562 ACUTE PAIN OF LEFT KNEE: ICD-10-CM

## 2024-05-03 DIAGNOSIS — R39.15 URINARY URGENCY: ICD-10-CM

## 2024-05-03 DIAGNOSIS — R30.0 DYSURIA: ICD-10-CM

## 2024-05-03 DIAGNOSIS — R42 DIZZINESS: ICD-10-CM

## 2024-05-03 LAB
25(OH)D3 SERPL-MCNC: 46 NG/ML (ref 30–100)
BASOPHILS ABSOLUTE: 0.1 THOU/MM3 (ref 0–0.1)
BASOPHILS NFR BLD AUTO: 1.1 %
CALCIUM SERPL-MCNC: 9.3 MG/DL (ref 8.5–10.5)
CHOLEST SERPL-MCNC: 255 MG/DL (ref 100–199)
DEPRECATED MEAN GLUCOSE BLD GHB EST-ACNC: 102 MG/DL (ref 70–126)
DEPRECATED RDW RBC AUTO: 50.2 FL (ref 35–45)
EOSINOPHIL NFR BLD AUTO: 2.2 %
EOSINOPHILS ABSOLUTE: 0.1 THOU/MM3 (ref 0–0.4)
ERYTHROCYTE [DISTWIDTH] IN BLOOD BY AUTOMATED COUNT: 14.2 % (ref 11.5–14.5)
ERYTHROCYTE [SEDIMENTATION RATE] IN BLOOD BY WESTERGREN METHOD: 24 MM/HR (ref 0–20)
HBA1C MFR BLD HPLC: 5.4 % (ref 4.4–6.4)
HCT VFR BLD AUTO: 42.6 % (ref 37–47)
HDLC SERPL-MCNC: 89 MG/DL
HGB BLD-MCNC: 13.6 GM/DL (ref 12–16)
IMM GRANULOCYTES # BLD AUTO: 0.01 THOU/MM3 (ref 0–0.07)
IMM GRANULOCYTES NFR BLD AUTO: 0.2 %
LDLC SERPL CALC-MCNC: 153 MG/DL
LYMPHOCYTES ABSOLUTE: 1.7 THOU/MM3 (ref 1–4.8)
LYMPHOCYTES NFR BLD AUTO: 30.7 %
MAGNESIUM SERPL-MCNC: 2.4 MG/DL (ref 1.6–2.4)
MCH RBC QN AUTO: 30.9 PG (ref 26–33)
MCHC RBC AUTO-ENTMCNC: 31.9 GM/DL (ref 32.2–35.5)
MCV RBC AUTO: 96.8 FL (ref 81–99)
MONOCYTES ABSOLUTE: 0.6 THOU/MM3 (ref 0.4–1.3)
MONOCYTES NFR BLD AUTO: 10.9 %
NEUTROPHILS ABSOLUTE: 3 THOU/MM3 (ref 1.8–7.7)
NEUTROPHILS NFR BLD AUTO: 54.9 %
NRBC BLD AUTO-RTO: 0 /100 WBC
PLATELET # BLD AUTO: 293 THOU/MM3 (ref 130–400)
PMV BLD AUTO: 10.5 FL (ref 9.4–12.4)
PROGEST SERPL-MCNC: < 0.05 NG/ML
PTH-INTACT SERPL-MCNC: 28 PG/ML (ref 15–65)
RBC # BLD AUTO: 4.4 MILL/MM3 (ref 4.2–5.4)
RHEUMATOID FACT SERPL-ACNC: < 10 IU/ML (ref 0–13)
TRIGL SERPL-MCNC: 65 MG/DL (ref 0–199)
WBC # BLD AUTO: 5.5 THOU/MM3 (ref 4.8–10.8)

## 2024-05-03 PROCEDURE — 73562 X-RAY EXAM OF KNEE 3: CPT

## 2024-05-04 LAB
C-REACTIVE PROTEIN, HIGH SENSITIVITY: 3.7 MG/L (ref 0–3)
DHEA-S SERPL-MCNC: 47 UG/DL (ref 12–154)
ESTRADIOL LEVEL: 14 PG/ML
FOLLICLE STIMULATING HORMONE: 64.6 MIU/ML
LUTEINIZING HORMONE: 31.7 MIU/ML (ref 1.7–8.6)

## 2024-05-05 ENCOUNTER — APPOINTMENT (OUTPATIENT)
Dept: CT IMAGING | Age: 79
End: 2024-05-05
Payer: MEDICARE

## 2024-05-05 ENCOUNTER — HOSPITAL ENCOUNTER (OUTPATIENT)
Age: 79
Setting detail: OBSERVATION
Discharge: HOME | End: 2024-05-06
Attending: EMERGENCY MEDICINE
Payer: MEDICARE

## 2024-05-05 DIAGNOSIS — R51.9 INTRACTABLE HEADACHE, UNSPECIFIED CHRONICITY PATTERN, UNSPECIFIED HEADACHE TYPE: Primary | ICD-10-CM

## 2024-05-05 LAB — NUCLEAR IGG SER QL IA: NORMAL

## 2024-05-05 PROCEDURE — 70498 CT ANGIOGRAPHY NECK: CPT

## 2024-05-05 PROCEDURE — 83735 ASSAY OF MAGNESIUM: CPT

## 2024-05-05 PROCEDURE — 70450 CT HEAD/BRAIN W/O DYE: CPT

## 2024-05-05 PROCEDURE — 99285 EMERGENCY DEPT VISIT HI MDM: CPT

## 2024-05-05 PROCEDURE — 36415 COLL VENOUS BLD VENIPUNCTURE: CPT

## 2024-05-05 PROCEDURE — 80053 COMPREHEN METABOLIC PANEL: CPT

## 2024-05-05 PROCEDURE — 70496 CT ANGIOGRAPHY HEAD: CPT

## 2024-05-05 PROCEDURE — 85025 COMPLETE CBC W/AUTO DIFF WBC: CPT

## 2024-05-05 PROCEDURE — 96374 THER/PROPH/DIAG INJ IV PUSH: CPT

## 2024-05-05 PROCEDURE — 84443 ASSAY THYROID STIM HORMONE: CPT

## 2024-05-05 PROCEDURE — 96375 TX/PRO/DX INJ NEW DRUG ADDON: CPT

## 2024-05-05 RX ORDER — 0.9 % SODIUM CHLORIDE 0.9 %
1000 INTRAVENOUS SOLUTION INTRAVENOUS ONCE
Status: COMPLETED | OUTPATIENT
Start: 2024-05-06 | End: 2024-05-06

## 2024-05-05 RX ORDER — DIPHENHYDRAMINE HYDROCHLORIDE 50 MG/ML
25 INJECTION INTRAMUSCULAR; INTRAVENOUS ONCE
Status: COMPLETED | OUTPATIENT
Start: 2024-05-06 | End: 2024-05-06

## 2024-05-05 RX ORDER — PROCHLORPERAZINE EDISYLATE 5 MG/ML
10 INJECTION INTRAMUSCULAR; INTRAVENOUS ONCE
Status: COMPLETED | OUTPATIENT
Start: 2024-05-06 | End: 2024-05-06

## 2024-05-05 ASSESSMENT — PAIN SCALES - GENERAL: PAINLEVEL_OUTOF10: 9

## 2024-05-05 ASSESSMENT — PAIN DESCRIPTION - LOCATION: LOCATION: HEAD

## 2024-05-06 VITALS
SYSTOLIC BLOOD PRESSURE: 131 MMHG | TEMPERATURE: 97.9 F | OXYGEN SATURATION: 97 % | WEIGHT: 128.53 LBS | BODY MASS INDEX: 21.94 KG/M2 | HEIGHT: 64 IN | RESPIRATION RATE: 16 BRPM | DIASTOLIC BLOOD PRESSURE: 65 MMHG | HEART RATE: 73 BPM

## 2024-05-06 PROBLEM — G44.201 INTRACTABLE TENSION-TYPE HEADACHE: Status: ACTIVE | Noted: 2024-05-06

## 2024-05-06 PROBLEM — R51.9 INTRACTABLE HEADACHE: Status: ACTIVE | Noted: 2024-05-06

## 2024-05-06 LAB
ALBUMIN SERPL BCG-MCNC: 3.9 G/DL (ref 3.5–5.1)
ALP SERPL-CCNC: 66 U/L (ref 38–126)
ALT SERPL W/O P-5'-P-CCNC: 16 U/L (ref 11–66)
ANION GAP SERPL CALC-SCNC: 11 MEQ/L (ref 8–16)
ANION GAP SERPL CALC-SCNC: 11 MEQ/L (ref 8–16)
AST SERPL-CCNC: 20 U/L (ref 5–40)
BACTERIA URNS QL MICRO: ABNORMAL /HPF
BASOPHILS ABSOLUTE: 0 THOU/MM3 (ref 0–0.1)
BASOPHILS NFR BLD AUTO: 0.9 %
BILIRUB SERPL-MCNC: 0.3 MG/DL (ref 0.3–1.2)
BILIRUB UR QL STRIP.AUTO: NEGATIVE
BUN SERPL-MCNC: 20 MG/DL (ref 7–22)
BUN SERPL-MCNC: 26 MG/DL (ref 7–22)
CALCIUM SERPL-MCNC: 8.4 MG/DL (ref 8.5–10.5)
CALCIUM SERPL-MCNC: 9.2 MG/DL (ref 8.5–10.5)
CASTS #/AREA URNS LPF: ABNORMAL /LPF
CASTS 2: ABNORMAL /LPF
CHARACTER UR: CLEAR
CHLORIDE SERPL-SCNC: 100 MEQ/L (ref 98–111)
CHLORIDE SERPL-SCNC: 102 MEQ/L (ref 98–111)
CO2 SERPL-SCNC: 23 MEQ/L (ref 23–33)
CO2 SERPL-SCNC: 25 MEQ/L (ref 23–33)
COLOR: ABNORMAL
CREAT SERPL-MCNC: 0.5 MG/DL (ref 0.4–1.2)
CREAT SERPL-MCNC: 0.6 MG/DL (ref 0.4–1.2)
CRP SERPL-MCNC: < 0.3 MG/DL (ref 0–1)
CRYSTALS URNS MICRO: ABNORMAL
DEPRECATED RDW RBC AUTO: 47.9 FL (ref 35–45)
DEPRECATED RDW RBC AUTO: 49.7 FL (ref 35–45)
EKG ATRIAL RATE: 76 BPM
EKG P AXIS: 76 DEGREES
EKG P-R INTERVAL: 142 MS
EKG Q-T INTERVAL: 398 MS
EKG QRS DURATION: 78 MS
EKG QTC CALCULATION (BAZETT): 447 MS
EKG R AXIS: 88 DEGREES
EKG T AXIS: 72 DEGREES
EKG VENTRICULAR RATE: 76 BPM
EOSINOPHIL NFR BLD AUTO: 3.8 %
EOSINOPHILS ABSOLUTE: 0.2 THOU/MM3 (ref 0–0.4)
EPITHELIAL CELLS, UA: ABNORMAL /HPF
ERYTHROCYTE [DISTWIDTH] IN BLOOD BY AUTOMATED COUNT: 14 % (ref 11.5–14.5)
ERYTHROCYTE [DISTWIDTH] IN BLOOD BY AUTOMATED COUNT: 14.2 % (ref 11.5–14.5)
ERYTHROCYTE [SEDIMENTATION RATE] IN BLOOD BY WESTERGREN METHOD: 10 MM/HR (ref 0–20)
FLUAV RNA RESP QL NAA+PROBE: NOT DETECTED
FLUBV RNA RESP QL NAA+PROBE: NOT DETECTED
GFR SERPL CREATININE-BSD FRML MDRD: > 90 ML/MIN/1.73M2
GFR SERPL CREATININE-BSD FRML MDRD: > 90 ML/MIN/1.73M2
GLUCOSE SERPL-MCNC: 101 MG/DL (ref 70–108)
GLUCOSE SERPL-MCNC: 103 MG/DL (ref 70–108)
GLUCOSE UR QL STRIP.AUTO: NEGATIVE MG/DL
HCT VFR BLD AUTO: 37.2 % (ref 37–47)
HCT VFR BLD AUTO: 38.6 % (ref 37–47)
HGB BLD-MCNC: 12.3 GM/DL (ref 12–16)
HGB BLD-MCNC: 12.4 GM/DL (ref 12–16)
HGB UR QL STRIP.AUTO: NEGATIVE
IMM GRANULOCYTES # BLD AUTO: 0.01 THOU/MM3 (ref 0–0.07)
IMM GRANULOCYTES NFR BLD AUTO: 0.2 %
KETONES UR QL STRIP.AUTO: NEGATIVE
LYMPHOCYTES ABSOLUTE: 2.1 THOU/MM3 (ref 1–4.8)
LYMPHOCYTES NFR BLD AUTO: 38 %
MAGNESIUM SERPL-MCNC: 2.2 MG/DL (ref 1.6–2.4)
MAGNESIUM SERPL-MCNC: 2.2 MG/DL (ref 1.6–2.4)
MCH RBC QN AUTO: 30.6 PG (ref 26–33)
MCH RBC QN AUTO: 31.4 PG (ref 26–33)
MCHC RBC AUTO-ENTMCNC: 31.9 GM/DL (ref 32.2–35.5)
MCHC RBC AUTO-ENTMCNC: 33.3 GM/DL (ref 32.2–35.5)
MCV RBC AUTO: 94.2 FL (ref 81–99)
MCV RBC AUTO: 96 FL (ref 81–99)
MISCELLANEOUS 2: ABNORMAL
MONOCYTES ABSOLUTE: 0.5 THOU/MM3 (ref 0.4–1.3)
MONOCYTES NFR BLD AUTO: 8.7 %
NEUTROPHILS ABSOLUTE: 2.7 THOU/MM3 (ref 1.8–7.7)
NEUTROPHILS NFR BLD AUTO: 48.4 %
NITRITE UR QL STRIP: POSITIVE
NRBC BLD AUTO-RTO: 0 /100 WBC
OSMOLALITY SERPL CALC.SUM OF ELEC: 277 MOSMOL/KG (ref 275–300)
PH UR STRIP.AUTO: 7.5 [PH] (ref 5–9)
PLATELET # BLD AUTO: 260 THOU/MM3 (ref 130–400)
PLATELET # BLD AUTO: 268 THOU/MM3 (ref 130–400)
PMV BLD AUTO: 10 FL (ref 9.4–12.4)
PMV BLD AUTO: 9.9 FL (ref 9.4–12.4)
POTASSIUM SERPL-SCNC: 4 MEQ/L (ref 3.5–5.2)
POTASSIUM SERPL-SCNC: 4.1 MEQ/L (ref 3.5–5.2)
PROT SERPL-MCNC: 6.6 G/DL (ref 6.1–8)
PROT UR STRIP.AUTO-MCNC: NEGATIVE MG/DL
RBC # BLD AUTO: 3.95 MILL/MM3 (ref 4.2–5.4)
RBC # BLD AUTO: 4.02 MILL/MM3 (ref 4.2–5.4)
RBC URINE: ABNORMAL /HPF
RENAL EPI CELLS #/AREA URNS HPF: ABNORMAL /[HPF]
SARS-COV-2 RNA RESP QL NAA+PROBE: NOT DETECTED
SODIUM SERPL-SCNC: 136 MEQ/L (ref 135–145)
SODIUM SERPL-SCNC: 136 MEQ/L (ref 135–145)
SP GR UR REFRACT.AUTO: > 1.03 (ref 1–1.03)
THYROPEROXIDASE AB SERPL IA-ACNC: < 4 IU/ML (ref 0–25)
TSH SERPL DL<=0.005 MIU/L-ACNC: 3.32 UIU/ML (ref 0.4–4.2)
UROBILINOGEN, URINE: 0.2 EU/DL (ref 0–1)
WBC # BLD AUTO: 5.5 THOU/MM3 (ref 4.8–10.8)
WBC # BLD AUTO: 7.9 THOU/MM3 (ref 4.8–10.8)
WBC #/AREA URNS HPF: ABNORMAL /HPF
WBC #/AREA URNS HPF: NEGATIVE /[HPF]
YEAST LIKE FUNGI URNS QL MICRO: ABNORMAL

## 2024-05-06 PROCEDURE — G0378 HOSPITAL OBSERVATION PER HR: HCPCS

## 2024-05-06 PROCEDURE — 6360000002 HC RX W HCPCS

## 2024-05-06 PROCEDURE — 83735 ASSAY OF MAGNESIUM: CPT

## 2024-05-06 PROCEDURE — 6370000000 HC RX 637 (ALT 250 FOR IP)

## 2024-05-06 PROCEDURE — 99235 HOSP IP/OBS SAME DATE MOD 70: CPT | Performed by: INTERNAL MEDICINE

## 2024-05-06 PROCEDURE — 6360000002 HC RX W HCPCS: Performed by: EMERGENCY MEDICINE

## 2024-05-06 PROCEDURE — 80048 BASIC METABOLIC PNL TOTAL CA: CPT

## 2024-05-06 PROCEDURE — 81001 URINALYSIS AUTO W/SCOPE: CPT

## 2024-05-06 PROCEDURE — 87636 SARSCOV2 & INF A&B AMP PRB: CPT

## 2024-05-06 PROCEDURE — 6360000004 HC RX CONTRAST MEDICATION: Performed by: EMERGENCY MEDICINE

## 2024-05-06 PROCEDURE — 85027 COMPLETE CBC AUTOMATED: CPT

## 2024-05-06 PROCEDURE — 2T015 HOSPITALIST 2ND TOUCH: CPT | Performed by: INTERNAL MEDICINE

## 2024-05-06 PROCEDURE — 2580000003 HC RX 258: Performed by: EMERGENCY MEDICINE

## 2024-05-06 PROCEDURE — 86140 C-REACTIVE PROTEIN: CPT

## 2024-05-06 PROCEDURE — 36415 COLL VENOUS BLD VENIPUNCTURE: CPT

## 2024-05-06 PROCEDURE — 96361 HYDRATE IV INFUSION ADD-ON: CPT

## 2024-05-06 PROCEDURE — 93010 ELECTROCARDIOGRAM REPORT: CPT | Performed by: INTERNAL MEDICINE

## 2024-05-06 PROCEDURE — 93005 ELECTROCARDIOGRAM TRACING: CPT | Performed by: EMERGENCY MEDICINE

## 2024-05-06 PROCEDURE — 96360 HYDRATION IV INFUSION INIT: CPT

## 2024-05-06 PROCEDURE — 96372 THER/PROPH/DIAG INJ SC/IM: CPT

## 2024-05-06 PROCEDURE — 85651 RBC SED RATE NONAUTOMATED: CPT

## 2024-05-06 PROCEDURE — 2580000003 HC RX 258

## 2024-05-06 RX ORDER — SODIUM CHLORIDE 0.9 % (FLUSH) 0.9 %
5-40 SYRINGE (ML) INJECTION EVERY 12 HOURS SCHEDULED
Status: DISCONTINUED | OUTPATIENT
Start: 2024-05-06 | End: 2024-05-06 | Stop reason: HOSPADM

## 2024-05-06 RX ORDER — PSEUDOEPHEDRINE HCL 30 MG
30 TABLET ORAL EVERY 4 HOURS PRN
Status: DISCONTINUED | OUTPATIENT
Start: 2024-05-06 | End: 2024-05-06 | Stop reason: HOSPADM

## 2024-05-06 RX ORDER — CETIRIZINE HYDROCHLORIDE 10 MG/1
10 TABLET ORAL DAILY
Status: DISCONTINUED | OUTPATIENT
Start: 2024-05-06 | End: 2024-05-06 | Stop reason: HOSPADM

## 2024-05-06 RX ORDER — POTASSIUM CHLORIDE 20 MEQ/1
40 TABLET, EXTENDED RELEASE ORAL PRN
Status: DISCONTINUED | OUTPATIENT
Start: 2024-05-06 | End: 2024-05-06 | Stop reason: HOSPADM

## 2024-05-06 RX ORDER — LANOLIN ALCOHOL/MO/W.PET/CERES
3 CREAM (GRAM) TOPICAL NIGHTLY PRN
Status: DISCONTINUED | OUTPATIENT
Start: 2024-05-06 | End: 2024-05-06 | Stop reason: HOSPADM

## 2024-05-06 RX ORDER — CYCLOBENZAPRINE HCL 10 MG
10 TABLET ORAL 3 TIMES DAILY PRN
Status: DISCONTINUED | OUTPATIENT
Start: 2024-05-06 | End: 2024-05-06 | Stop reason: HOSPADM

## 2024-05-06 RX ORDER — BUTALBITAL, ACETAMINOPHEN AND CAFFEINE 50; 325; 40 MG/1; MG/1; MG/1
1 TABLET ORAL EVERY 4 HOURS PRN
Status: DISCONTINUED | OUTPATIENT
Start: 2024-05-06 | End: 2024-05-06 | Stop reason: HOSPADM

## 2024-05-06 RX ORDER — FLUTICASONE PROPIONATE 50 MCG
1 SPRAY, SUSPENSION (ML) NASAL DAILY PRN
Status: DISCONTINUED | OUTPATIENT
Start: 2024-05-06 | End: 2024-05-06 | Stop reason: HOSPADM

## 2024-05-06 RX ORDER — SODIUM CHLORIDE 9 MG/ML
INJECTION, SOLUTION INTRAVENOUS CONTINUOUS
Status: DISCONTINUED | OUTPATIENT
Start: 2024-05-06 | End: 2024-05-06 | Stop reason: HOSPADM

## 2024-05-06 RX ORDER — ONDANSETRON 2 MG/ML
4 INJECTION INTRAMUSCULAR; INTRAVENOUS EVERY 6 HOURS PRN
Status: DISCONTINUED | OUTPATIENT
Start: 2024-05-06 | End: 2024-05-06 | Stop reason: HOSPADM

## 2024-05-06 RX ORDER — SODIUM CHLORIDE 9 MG/ML
INJECTION, SOLUTION INTRAVENOUS PRN
Status: DISCONTINUED | OUTPATIENT
Start: 2024-05-06 | End: 2024-05-06 | Stop reason: HOSPADM

## 2024-05-06 RX ORDER — ACETAMINOPHEN 650 MG/1
650 SUPPOSITORY RECTAL EVERY 6 HOURS PRN
Status: DISCONTINUED | OUTPATIENT
Start: 2024-05-06 | End: 2024-05-06 | Stop reason: HOSPADM

## 2024-05-06 RX ORDER — MORPHINE SULFATE 2 MG/ML
2 INJECTION, SOLUTION INTRAMUSCULAR; INTRAVENOUS ONCE
Status: COMPLETED | OUTPATIENT
Start: 2024-05-06 | End: 2024-05-06

## 2024-05-06 RX ORDER — ALBUTEROL SULFATE 90 UG/1
2 AEROSOL, METERED RESPIRATORY (INHALATION) 4 TIMES DAILY PRN
Status: DISCONTINUED | OUTPATIENT
Start: 2024-05-06 | End: 2024-05-06 | Stop reason: HOSPADM

## 2024-05-06 RX ORDER — ENOXAPARIN SODIUM 100 MG/ML
40 INJECTION SUBCUTANEOUS DAILY
Status: DISCONTINUED | OUTPATIENT
Start: 2024-05-06 | End: 2024-05-06 | Stop reason: HOSPADM

## 2024-05-06 RX ORDER — MAGNESIUM SULFATE IN WATER 40 MG/ML
2000 INJECTION, SOLUTION INTRAVENOUS PRN
Status: DISCONTINUED | OUTPATIENT
Start: 2024-05-06 | End: 2024-05-06 | Stop reason: HOSPADM

## 2024-05-06 RX ORDER — CYCLOBENZAPRINE HCL 10 MG
10 TABLET ORAL
Qty: 10 TABLET | Refills: 0 | Status: SHIPPED | OUTPATIENT
Start: 2024-05-06 | End: 2024-05-16

## 2024-05-06 RX ORDER — ACETAMINOPHEN 325 MG/1
650 TABLET ORAL EVERY 6 HOURS PRN
Status: DISCONTINUED | OUTPATIENT
Start: 2024-05-06 | End: 2024-05-06 | Stop reason: HOSPADM

## 2024-05-06 RX ORDER — CIPROFLOXACIN 500 MG/1
500 TABLET, FILM COATED ORAL 2 TIMES DAILY
Status: DISCONTINUED | OUTPATIENT
Start: 2024-05-06 | End: 2024-05-06 | Stop reason: HOSPADM

## 2024-05-06 RX ORDER — POLYETHYLENE GLYCOL 3350 17 G/17G
17 POWDER, FOR SOLUTION ORAL DAILY PRN
Status: DISCONTINUED | OUTPATIENT
Start: 2024-05-06 | End: 2024-05-06 | Stop reason: HOSPADM

## 2024-05-06 RX ORDER — ONDANSETRON 4 MG/1
4 TABLET, ORALLY DISINTEGRATING ORAL EVERY 8 HOURS PRN
Status: DISCONTINUED | OUTPATIENT
Start: 2024-05-06 | End: 2024-05-06 | Stop reason: HOSPADM

## 2024-05-06 RX ORDER — CYCLOBENZAPRINE HCL 10 MG
10 TABLET ORAL
Qty: 10 TABLET | Refills: 0 | Status: SHIPPED | OUTPATIENT
Start: 2024-05-06 | End: 2024-05-06

## 2024-05-06 RX ORDER — POTASSIUM CHLORIDE 7.45 MG/ML
10 INJECTION INTRAVENOUS PRN
Status: DISCONTINUED | OUTPATIENT
Start: 2024-05-06 | End: 2024-05-06 | Stop reason: HOSPADM

## 2024-05-06 RX ORDER — SODIUM CHLORIDE 0.9 % (FLUSH) 0.9 %
5-40 SYRINGE (ML) INJECTION PRN
Status: DISCONTINUED | OUTPATIENT
Start: 2024-05-06 | End: 2024-05-06 | Stop reason: HOSPADM

## 2024-05-06 RX ORDER — KETOROLAC TROMETHAMINE 30 MG/ML
15 INJECTION, SOLUTION INTRAMUSCULAR; INTRAVENOUS ONCE
Status: DISCONTINUED | OUTPATIENT
Start: 2024-05-06 | End: 2024-05-06

## 2024-05-06 RX ADMIN — MORPHINE SULFATE 2 MG: 2 INJECTION, SOLUTION INTRAMUSCULAR; INTRAVENOUS at 02:27

## 2024-05-06 RX ADMIN — IOPAMIDOL 80 ML: 755 INJECTION, SOLUTION INTRAVENOUS at 00:32

## 2024-05-06 RX ADMIN — SODIUM CHLORIDE: 9 INJECTION, SOLUTION INTRAVENOUS at 03:50

## 2024-05-06 RX ADMIN — CIPROFLOXACIN HYDROCHLORIDE 500 MG: 500 TABLET, FILM COATED ORAL at 09:34

## 2024-05-06 RX ADMIN — PROCHLORPERAZINE EDISYLATE 10 MG: 5 INJECTION INTRAMUSCULAR; INTRAVENOUS at 00:26

## 2024-05-06 RX ADMIN — SODIUM CHLORIDE 1000 ML: 9 INJECTION, SOLUTION INTRAVENOUS at 00:20

## 2024-05-06 RX ADMIN — CETIRIZINE HYDROCHLORIDE 10 MG: 10 TABLET, FILM COATED ORAL at 09:34

## 2024-05-06 RX ADMIN — ENOXAPARIN SODIUM 40 MG: 100 INJECTION SUBCUTANEOUS at 09:34

## 2024-05-06 RX ADMIN — DIPHENHYDRAMINE HYDROCHLORIDE 25 MG: 50 INJECTION INTRAMUSCULAR; INTRAVENOUS at 00:26

## 2024-05-06 ASSESSMENT — PAIN SCALES - GENERAL
PAINLEVEL_OUTOF10: 7
PAINLEVEL_OUTOF10: 0
PAINLEVEL_OUTOF10: 1

## 2024-05-06 ASSESSMENT — PAIN DESCRIPTION - LOCATION: LOCATION: HEAD

## 2024-05-06 NOTE — PROGRESS NOTES
Discharge instructions given with patient verbalizing understanding and patient discharged to home with spouse.

## 2024-05-06 NOTE — ED NOTES
Pt to rm 18 per intake states she has a headache that stated [place] 1500 [place]. States pain behind both eyes and to the back of her head that goes down her neck. Reports taking tylenol

## 2024-05-06 NOTE — ED NOTES
ED to inpatient nurses report      Chief Complaint:  Chief Complaint   Patient presents with    Headache     Present to ED from: Home    MOA:     LOC: alert and orientated to name, place, date  Mobility: Independent  Oxygen Baseline: RA    Current needs required: RA     Code Status:   No Order    What abnormal results were found and what did you give/do to treat them?   Any procedures or intervention occur?     Mental Status:  Level of Consciousness: Alert (0)    Psych Assessment:        Vitals:  Patient Vitals for the past 24 hrs:   BP Temp Temp src Pulse Resp SpO2   05/06/24 0226 136/66 -- -- 80 16 95 %   05/06/24 0045 (!) 174/84 -- -- -- -- 93 %   05/05/24 2328 (!) 148/77 98.7 °F (37.1 °C) Oral 89 16 94 %        LDAs:   Peripheral IV 05/06/24 Right Wrist (Active)   Site Assessment Clean, dry & intact 05/06/24 0020   Line Status Brisk blood return;Flushed 05/06/24 0020       Ambulatory Status:  No data recorded    Diagnosis:  DISPOSITION Decision To Admit 05/06/2024 02:09:50 AM   Final diagnoses:   Intractable headache, unspecified chronicity pattern, unspecified headache type        Consults:  None     Pain Score:  Pain Assessment  Pain Assessment: 0-10  Pain Level: 7  Pain Location: Head    C-SSRS:   Risk of Suicide: No Risk    Sepsis Screening:  Sepsis Risk Score: 0.58    [place] Fall Risk:       Swallow Screening        Preferred Language:   English      ALLERGIES     Gluten, Fructose, Gluten meal, Orange oil, Pcn [penicillins], and Sugar-protein-starch    SURGICAL HISTORY       Past Surgical History:   Procedure Laterality Date    BREAST SURGERY      FINGER TRIGGER RELEASE Right 2017       PAST MEDICAL HISTORY       Past Medical History:   Diagnosis Date    Cancer (HCC)            Electronically signed by GERMAN ABEBE RN on 5/6/2024 at 2:30 AM

## 2024-05-06 NOTE — ED NOTES
Spoke to ALLISON Kowalski to approve pt transport to [facility] in stable condition.

## 2024-05-06 NOTE — H&P
normal.  Oral mucosa moist without lesions.  Hearing grossly intact.   Neck: Supple, with full range of motion. Trachea midline.  No gross JVD appreciated.  Respiratory:  Normal respiratory effort. Clear to auscultation, bilaterally without rales or wheezes or rhonchi.  Cardiovascular: Normal rate, regular rhythm with normal S1/S2 without murmurs.  No lower extremity edema.   Abdomen: Soft, non-tender, non-distended with normal bowel sounds.  Musculoskeletal: No joint swelling or tenderness. Normal tone. No abnormal movements.   Skin: Warm and dry. No rashes or lesions.  Neurologic:  No focal sensory/motor deficits in the upper and lower extremities. Cranial nerves:  grossly non-focal 2-12.     Psychiatric: Alert and oriented, normal insight and thought content.   Capillary Refill: Brisk,< 3 seconds.           Peripheral Pulses: +2 palpable, equal bilaterally.       EKG:  I have reviewed the EKG with the following interpretation: EKG NSR w/o QTC prolongation nor acute ischemic changes from prior.      Labs:     Recent Labs     05/03/24  1119 05/05/24  2351   WBC 5.5 5.5   HGB 13.6 12.4   HCT 42.6 37.2    268     Recent Labs     05/03/24  1117 05/05/24  2351   NA  --  136   K  --  4.1   CL  --  100   CO2  --  25   BUN  --  26*   CREATININE  --  0.6   CALCIUM 9.3 9.2     Recent Labs     05/05/24  2351   AST 20   ALT 16   BILITOT 0.3   ALKPHOS 66     No results for input(s): \"INR\" in the last 72 hours.  No results for input(s): \"TROPONINT\" in the last 72 hours.  No results for input(s): \"PROCAL\" in the last 72 hours.   Lab Results   Component Value Date/Time    NITRU POSITIVE 05/06/2024 03:58 AM    WBCUA 0-2 05/06/2024 03:58 AM    BACTERIA NONE SEEN 05/06/2024 03:58 AM    RBCUA 0-2 05/06/2024 03:58 AM    BLOODU NEGATIVE 05/06/2024 03:58 AM    GLUCOSEU NEGATIVE 05/06/2024 03:58 AM         Radiology:   CT HEAD WO CONTRAST   Final Result   No acute intracranial findings.   Nonemergent/incidental findings in the

## 2024-05-06 NOTE — CARE COORDINATION
Case Management Assessment Initial Evaluation    Date/Time of Evaluation: 2024 8:57 AM  Assessment Completed by: Lisbeth Lilly RN    If patient is discharged prior to next notation, then this note serves as note for discharge by case management.    Patient Name: Jenifer Zabala                   YOB: 1945  Diagnosis: Intractable tension-type headache [G44.201]  Intractable headache, unspecified chronicity pattern, unspecified headache type [R51.9]                   Date / Time: 2024 11:24 PM  Location: A     Patient Admission Status: Observation   Readmission Risk Low 0-14, Mod 15-19), High > 20: No data recorded  Current PCP: Louisa Veras, APRN - CNP    Additional Case Management Notes: Admitted through ED with intractable headache. Recently diagnosed with UTI (proteus mirabilis) and placed on Cipro po. IVF. Lovenox. Pain control.     Procedures: None    Imagin/6 CT head: nothing acute.    CTA head: No intracranial large artery occlusion or hemodynamically significant stenosis.   CTA neck: No occlusion or hemodynamically significant stenosis in the carotid or vertebral arteries. No dissection. Atelectasis and/or scarring in the upper lungs. Right thyroid nodule measuring less than 1.5 cm.    Patient Goals/Plan/Treatment Preferences: Pt is discharging home today. No needs per nursing.

## 2024-05-06 NOTE — PLAN OF CARE
Problem: Discharge Planning  Goal: Discharge to home or other facility with appropriate resources  Outcome: Progressing  Flowsheets (Taken 5/6/2024 0330)  Discharge to home or other facility with appropriate resources:   Identify barriers to discharge with patient and caregiver   Arrange for needed discharge resources and transportation as appropriate   Identify discharge learning needs (meds, wound care, etc)   Refer to discharge planning if patient needs post-hospital services based on physician order or complex needs related to functional status, cognitive ability or social support system     Problem: Pain  Goal: Verbalizes/displays adequate comfort level or baseline comfort level  Outcome: Progressing  Flowsheets (Taken 5/6/2024 0415)  Verbalizes/displays adequate comfort level or baseline comfort level:   Encourage patient to monitor pain and request assistance   Administer analgesics based on type and severity of pain and evaluate response   Assess pain using appropriate pain scale   Implement non-pharmacological measures as appropriate and evaluate response   Care plan reviewed with patient.  Patient verbalized understanding of the plan of care and contribute to goal setting.

## 2024-05-06 NOTE — DISCHARGE INSTR - DIET
Good nutrition is important when healing from an illness, injury, or surgery.  Follow any nutrition recommendations given to you during your hospital stay.   If you were given an oral nutrition supplement while in the hospital, continue to take this supplement at home.  You can take it with meals, in-between meals, and/or before bedtime. These supplements can be purchased at most local grocery stores, pharmacies, and chain [employer]-stores.   If you have any questions about your diet or nutrition, call the hospital and ask for the dietitian.  Regular diet as tolerated, drink extra fluids

## 2024-05-06 NOTE — PROGRESS NOTES
Patient arrived to unit via stretcher at approximately 0325 with personal belongings. Patient ambulated safely to hospital bed, bed alarm turned on and patient given call light with instructions on use. Discussed plan of care, orders, medications, labs, and tests ordered.

## 2024-05-06 NOTE — RT PROTOCOL NOTE
RT Inhaler-Nebulizer Bronchodilator Protocol Note    There is a bronchodilator order in the chart from a provider indicating to follow the RT Bronchodilator Protocol and there is an “Initiate RT Inhaler-Nebulizer Bronchodilator Protocol” order as well (see protocol at bottom of note).    CXR Findings:  No results found.    The findings from the last RT Protocol Assessment were as follows:   History Pulmonary Disease: Chronic pulmonary disease (suspected COPD)  Respiratory Pattern: Regular pattern and RR 12-20 bpm  Breath Sounds: Clear breath sounds  Cough: Strong, spontaneous, non-productive  Indication for Bronchodilator Therapy:    Bronchodilator Assessment Score: 2    Aerosolized bronchodilator medication orders have been revised according to the RT Inhaler-Nebulizer Bronchodilator Protocol below.    Respiratory Therapist to perform RT Therapy Protocol Assessment initially then follow the protocol.  Repeat RT Therapy Protocol Assessment PRN for score 0-3 or on second treatment, BID, and PRN for scores above 3.    No Indications - adjust the frequency to every 6 hours PRN wheezing or bronchospasm, if no treatments needed after 48 hours then discontinue using Per Protocol order mode.     If indication present, adjust the RT bronchodilator orders based on the Bronchodilator Assessment Score as indicated below.  Use Inhaler orders unless patient has one or more of the following: on home nebulizer, not able to hold breath for 10 seconds, is not alert and oriented, cannot activate and use MDI correctly, or respiratory rate 25 breaths per minute or more, then use the equivalent nebulizer order(s) with same Frequency and PRN reasons based on the score.  If a patient is on this medication at home then do not decrease Frequency below that used at home.    0-3 - enter or revise RT bronchodilator order(s) to equivalent RT Bronchodilator order with Frequency of every 4 hours PRN for wheezing or increased work of breathing

## 2024-05-06 NOTE — DISCHARGE INSTRUCTIONS
Tension Headache: Care Instructions  Overview  Most headaches are tension headaches. Some people get them often, especially if they have a lot of stress in their lives.  This kind of headache may cause pain or a feeling of pressure all over your head. Sometimes it's hard to know where the center of the pain is.  If you get a lot of these kind of headaches, it can help to talk to your doctor. You can work together to find the treatment that works best for you.  Follow-up care is a key part of your treatment and safety. Be sure to make and go to all appointments, and call your doctor if you are having problems. It's also a good idea to know your test results and keep a list of the medicines you take.  How can you care for yourself at home?  Rest in a quiet, dark room. Put a cool cloth on your forehead. Close your eyes, and try to relax or go to sleep. Do not watch TV, read, or use the computer.  Use a warm, moist towel or a heating pad set on low on your shoulder and neck muscles.  Have someone gently massage your neck and shoulders.  Be safe with medicines. Read and follow all instructions on the label.  If you are not taking a prescription pain medicine, ask your doctor if you can take an over-the-counter medicine.  Talk to your doctor about how often to take medicine to treat your headache. If you take it too often, it can lead to more headaches.  If you get a headache, stop what you are doing and sit quietly for a moment. Close your eyes and breathe slowly.  Pay attention to any new symptoms you have when you have a headache. These include a fever, weakness or numbness, vision changes, or confusion. They may be signs of a more serious problem.  How can you prevent tension headaches?  Here are some things you can try to help prevent tension headaches.  Keep a headache diary. This can help you and your doctor figure out what is triggering your headaches. If you can avoid your triggers, you may be able to

## 2024-05-06 NOTE — ED PROVIDER NOTES
I performed a history and physical examination of the patient and discussed management with the resident. I reviewed the resident’s note and agree with the documented findings and plan of care. Any areas of disagreement are noted on the chart. I was personally present for the key portions of any procedures. I have documented in the chart those procedures where I was not present during the key portions. I have reviewed the emergency nurses triage note. I agree with the chief complaint, past medical history, past surgical history, allergies, medications, social and family history as documented unless otherwise noted below. Documentation of the HPI, Physical Exam and Medical Decision Making performed by medical students or scribes is based on my personal performance of the HPI, PE and MDM. For Phys Assistant/ Nurse Practitioner cases/documentation I have personally evaluated this patient and have completed at least one if not all key elements of the E/M (history, physical exam, and MDM). My findings are as noted below.    In other words, I personally saw and examined the patient I have reviewed and agreed with the resident findings including all diagnostic interpretations and treatment plans as written.  I was present for the key portion of any procedures performed and the inclusive time noted in any critical care statement.    Patient presents today for headache.  Apparently [facility] , she started developing a headache around 230 3:00 in the afternoon so they drove home.  The patient took Tylenol but she states it did not seem to help very much.  Patient states that she has chronic neck problems since she sees a chiropractor for.  Patient states that she has some mild visual complaints with this.  Patient states that the headache was not sudden onset but it is one of the worst headache she has ever had.  Patient denies any loss of function of either side of her body.  Patient 
no administration in time range)   sodium chloride flush 0.9 % injection 5-40 mL ( IntraVENous Not Given 5/6/24 0939)   sodium chloride flush 0.9 % injection 5-40 mL (has no administration in time range)   0.9 % sodium chloride infusion (has no administration in time range)   potassium chloride (KLOR-CON M) extended release tablet 40 mEq (has no administration in time range)     Or   potassium bicarb-citric acid (EFFER-K) effervescent tablet 40 mEq (has no administration in time range)     Or   potassium chloride 10 mEq/100 mL IVPB (Peripheral Line) (has no administration in time range)   magnesium sulfate 2000 mg in 50 mL IVPB premix (has no administration in time range)   enoxaparin (LOVENOX) injection 40 mg (40 mg SubCUTAneous Given 5/6/24 0934)   ondansetron (ZOFRAN-ODT) disintegrating tablet 4 mg (has no administration in time range)     Or   ondansetron (ZOFRAN) injection 4 mg (has no administration in time range)   polyethylene glycol (GLYCOLAX) packet 17 g (has no administration in time range)   acetaminophen (TYLENOL) tablet 650 mg (has no administration in time range)     Or   acetaminophen (TYLENOL) suppository 650 mg (has no administration in time range)   cyclobenzaprine (FLEXERIL) tablet 10 mg (has no administration in time range)   butalbital-acetaminophen-caffeine (FIORICET, ESGIC) per tablet 1 tablet (has no administration in time range)   ciprofloxacin (CIPRO) tablet 500 mg (500 mg Oral Given 5/6/24 0934)   0.9 % sodium chloride infusion (0 mL/hr IntraVENous Stopped 5/6/24 1511)   prochlorperazine (COMPAZINE) injection 10 mg (10 mg IntraVENous Given 5/6/24 0026)   diphenhydrAMINE (BENADRYL) injection 25 mg (25 mg IntraVENous Given 5/6/24 0026)   sodium chloride 0.9 % bolus 1,000 mL (0 mLs IntraVENous Stopped 5/6/24 0220)   iopamidol (ISOVUE-370) 76 % injection 80 mL (80 mLs IntraVENous Given 5/6/24 0032)   morphine (PF) injection 2 mg (2 mg IntraVENous Given 5/6/24 0227)       MEDICAL DECISION

## 2024-05-07 ENCOUNTER — CARE COORDINATION (OUTPATIENT)
Dept: CASE MANAGEMENT | Age: 79
End: 2024-05-07

## 2024-05-07 LAB
DHEA SERPL-MCNC: 0.75 NG/ML (ref 0.63–4.7)
TESTOSTERONE, FREE W SHGB, FEMALES/CHILDREN: NORMAL

## 2024-05-07 NOTE — CARE COORDINATION
Care Transitions Note    Initial Call - Call within 2 business days of discharge: Yes     Attempted to reach patient for transitions of care follow up. Unable to reach patient.    Outreach Attempts:   HIPAA compliant voicemail left for patient.     Patient: Jenifer Zabala    Patient : 1945   MRN: [id number]    Reason for Admission: Intractable headache  Discharge Date: 24  RURS: Readmission Risk              Risk of Unplanned Readmission:  0          Last Discharge Facility       Date Complaint Diagnosis Description Type Department Provider    24 Headache Intractable headache, unspecified chronicity pattern, unspecified headache type ED to Hosp-Admission (Discharged) (ADMITTED) [facility] Dennis Hill MD; Michi Granados...            Was this an external facility discharge? No    Follow Up Appointment:   Patient has hospital follow up appointment scheduled within 7 days of discharge.    Future Appointments         Provider Specialty Dept Phone    2024 8:00 AM Honorio Paige MD Family Medicine [phone]    2024 11:00 AM Louisa Veras, MATTHEW - CNP Family Medicine [phone]    2024 1:00 PM Louisa Veras, MATTHEW - CNP Family Medicine [phone]          1st attempt to contact pt for initial care transition follow up.  Unable to reach pt.  Left message with contact information and request for call back.      Plan for follow-up on next business day.      Maryam Licona RN

## 2024-05-07 NOTE — DISCHARGE SUMMARY
NEGATIVE    pH, Urine 7.5 5.0 - 9.0    Protein, UA NEGATIVE NEGATIVE    Urobilinogen, Urine 0.2 0.0 - 1.0 eu/dl    Nitrite, Urine POSITIVE (A) NEGATIVE    Leukocyte Esterase, Urine NEGATIVE NEGATIVE    Color, UA DK YELLOW (A) STRAW-YELLOW    Character, Urine CLEAR CLEAR-SL CLOUD    RBC, UA 0-2 0-2/hpf /hpf    WBC, UA 0-2 0-4/hpf /hpf    Epithelial Cells, UA NONE SEEN 3-5/hpf /hpf    Bacteria, UA NONE SEEN FEW/NONE SEEN /hpf    Casts UA NONE SEEN NONE SEEN /lpf    Crystals, UA NONE SEEN NONE SEEN    Renal Epithelial, UA NONE SEEN NONE SEEN    Yeast, UA NONE SEEN NONE SEEN    CASTS 2 NONE SEEN NONE SEEN /lpf    MISCELLANEOUS 2 NONE SEEN    Anion Gap   Result Value Ref Range    Anion Gap 11.0 8.0 - 16.0 meq/L   Glomerular Filtration Rate, Estimated   Result Value Ref Range    Est, Glom Filt Rate > 90 >60 ml/min/1.73m2   C-Reactive Protein   Result Value Ref Range    CRP < 0.30 0.00 - 1.00 mg/dl   Sedimentation Rate   Result Value Ref Range    Sed Rate 10 0 - 20 mm/hr   EKG 12 Lead   Result Value Ref Range    Ventricular Rate 76 BPM    Atrial Rate 76 BPM    P-R Interval 142 ms    QRS Duration 78 ms    Q-T Interval 398 ms    QTc Calculation (Bazett) 447 ms    P Axis 76 degrees    R Axis 88 degrees    T Axis 72 degrees       Diet:  No diet orders on file    Activity:  Activity as tolerated (Patient may move about with assist as indicated or with supervision.)    Follow-up:  in the next few days with Louisa Veras, MATTHEW - CNP,  in the next few weeks with your primary care doctor    Disposition: home    Condition: Stable      Time Spent: 35 minutes    Electronically signed by Dennis Hill MD on 5/7/2024 at 2:56 PM    Discharging Hospitalist

## 2024-05-08 ENCOUNTER — CARE COORDINATION (OUTPATIENT)
Dept: CASE MANAGEMENT | Age: 79
End: 2024-05-08

## 2024-05-08 NOTE — CARE COORDINATION
Care Transitions Note    Initial Call - Call within 2 business days of discharge: Yes     Patient Current Location:  [place]    Care Transition Nurse contacted the patient by telephone to perform post hospital discharge assessment, verified name and  as identifiers. Provided introduction to self, and explanation of the Care Transition Nurse role.     Patient: Jenifer Zabala    Patient : 1945   MRN: [id number]    Reason for Admission: Headache  Discharge Date: 24  RURS: No data recorded    Last Discharge Facility       Date Complaint Diagnosis Description Type Department Provider    24 Headache Intractable headache, unspecified chronicity pattern, unspecified headache type ED to Hosp-Admission (Discharged) (ADMITTED) [facility] Dennis Hill MD; Michi Granados...            Was this an external facility discharge? No    Additional needs identified to be addressed with provider   No needs identified             Method of communication with provider: none.    Patients top risk factors for readmission: medical condition-Intractable tension like headache, chronic pain, COPD, Mixed hyperlipidemia    Interventions to address risk factors:   Has follow up appts    Care Summary Note: Contacted pt for initial care transition follow up.  Spoke very briefly with pt.  Jenifer states she is doing fine.  States headache is gone.  Reports having an episode 25 years ago but none since.  Denies having any neck pain/discomfort.  Vision unchanged.  Reports she was started on medication for UTI (Cipro).  States she typically drinks 60 oz of water per day but water intake decreased to 30 oz per day when she started the medication.  She is back to drinking her regular amount of water per day.   Denies having any new or worsening s/s currently.  Attempted to review medication list.  Pt reports she is currently at work.  She confirmed she has the Flexeril and taking as prescribed.  States she is back to taking all of her regular

## 2024-05-08 NOTE — PROGRESS NOTES
[place] [facility]  - Cinnamon bark 500 mg (without Chromium or extracts)   some brands list 1000 mg / serving of 2 capsules,    some brands have 1000 mg caps with the undesireable chromium extract  - Calcium carbonate/citrate, magnesium oxide/citrate, Vit D-3 as 3-4 tabs/caps/serving     Some Local Brands may contain Zinc which is acceptable for the first bottle or two  - Magnesium oxide 250 mg tabs for those having < 2 bowel movements daily  - Magnesium citrate 200 mg if having > 2 bowel movements/day  - Centrum Silver or look-a-like for most patients, Centrum plain or look-a-like with iron  - Vitamin D-3 comes as 1,000 IU or 2,000 IU or 5,000 IU gel caps or Liquid drops but keep Vitamin D levels <50 but >40     Some brands containing or derived from soy oil or corn oil are OK if not allergic to soy  - Elemental Iron 65 mg tabs at bedtime is available over the counter if need more iron     Usually turns bowel movements grey, green, or black but not a concern  - Apricot Kernel Oil (by Now) for dry skin sensitive perineal or perianal area skin    Nutrients for ongoing use by Mail order for less expense from [employer] ;  - Strength Fish Oil , 240 Softgels Item #374490  -B-100 time released balanced B complex Item #328513  - Cinnamon bark 500 mg without Chromium or extract Item #714170  - Calcium carbonate 1000 mg, Magnesium oxide 500 mg, Vit D-3 400 IU Item #406187  - Magnesium oxide 500 mg tabs Item #937168 if less than 2 bowel movements daily  - [employer] Seniors Item #054238 for most patients, One Daily Item #824276 with iron  - Vit D 3  1,000 Item #317115      2,000 IU Item #761282   Item #887109     Some brands containing orderived from soy oil or corn oil are OK if not allergic to soy    Nutrients for Special Needs by Mail order for less expense from www.puritan.com;  -Elemental Iron 65 mg tabs Item #530215 if need more iron for low iron on labs    Usually turns bowel movements grey, green or black but

## 2024-05-09 ENCOUNTER — OFFICE VISIT (OUTPATIENT)
Dept: FAMILY MEDICINE CLINIC | Age: 79
End: 2024-05-09

## 2024-05-09 VITALS
HEIGHT: 64 IN | OXYGEN SATURATION: 96 % | BODY MASS INDEX: 21.34 KG/M2 | HEART RATE: 78 BPM | TEMPERATURE: 97.8 F | DIASTOLIC BLOOD PRESSURE: 62 MMHG | SYSTOLIC BLOOD PRESSURE: 132 MMHG | WEIGHT: 125 LBS | RESPIRATION RATE: 10 BRPM

## 2024-05-09 DIAGNOSIS — R30.0 DYSURIA: Primary | ICD-10-CM

## 2024-05-09 DIAGNOSIS — R79.9 ABNORMAL FINDING OF BLOOD CHEMISTRY, UNSPECIFIED: ICD-10-CM

## 2024-05-09 DIAGNOSIS — R42 DIZZINESS: ICD-10-CM

## 2024-05-09 DIAGNOSIS — E78.2 MIXED HYPERLIPIDEMIA: ICD-10-CM

## 2024-05-09 DIAGNOSIS — C50.011 MALIGNANT NEOPLASM OF NIPPLE OF RIGHT BREAST IN FEMALE, UNSPECIFIED ESTROGEN RECEPTOR STATUS (HCC): ICD-10-CM

## 2024-05-09 DIAGNOSIS — R39.15 URINARY URGENCY: ICD-10-CM

## 2024-05-09 DIAGNOSIS — J44.9 CHRONIC OBSTRUCTIVE PULMONARY DISEASE, UNSPECIFIED COPD TYPE (HCC): ICD-10-CM

## 2024-05-09 DIAGNOSIS — R68.89: ICD-10-CM

## 2024-05-09 DIAGNOSIS — K90.89 OTHER INTESTINAL MALABSORPTION: ICD-10-CM

## 2024-05-09 DIAGNOSIS — E55.9 VITAMIN D DEFICIENCY, UNSPECIFIED: ICD-10-CM

## 2024-05-13 ENCOUNTER — OFFICE VISIT (OUTPATIENT)
Dept: FAMILY MEDICINE CLINIC | Age: 79
End: 2024-05-13

## 2024-05-13 VITALS
WEIGHT: 127.2 LBS | SYSTOLIC BLOOD PRESSURE: 126 MMHG | RESPIRATION RATE: 16 BRPM | HEART RATE: 88 BPM | TEMPERATURE: 97.7 F | BODY MASS INDEX: 21.72 KG/M2 | HEIGHT: 64 IN | DIASTOLIC BLOOD PRESSURE: 60 MMHG

## 2024-05-13 DIAGNOSIS — Z09 HOSPITAL DISCHARGE FOLLOW-UP: ICD-10-CM

## 2024-05-13 DIAGNOSIS — R51.9 INTRACTABLE HEADACHE, UNSPECIFIED CHRONICITY PATTERN, UNSPECIFIED HEADACHE TYPE: Primary | ICD-10-CM

## 2024-05-13 NOTE — PROGRESS NOTES
Post-Discharge Transitional Care  Follow Up      Jenifer Zabala   YOB: 1945    Date of Office Visit:  5/13/2024  Date of Hospital Admission: 5/5/24  Date of Hospital Discharge: 5/6/24  Risk of hospital readmission (high >=14%. Medium >=10%) :No data recorded    Care management risk score Rising risk (score 2-5) and Complex Care (Scores >=6): No Risk Score On File     Non face to face  following discharge, date last encounter closed (first attempt may have been earlier): 05/08/2024    Call initiated 2 business days of discharge: Yes    ASSESSMENT/PLAN:   Intractable headache, unspecified chronicity pattern, unspecified headache type  Hospital discharge follow-up  -     TN DISCHARGE MEDS RECONCILED W/ CURRENT OUTPATIENT MED LIST      Medical Decision Making: low complexity  No follow-ups on file.    On this date 5/13/2024 I have spent 30 minutes reviewing previous notes, test results and face to face with the patient discussing the diagnosis and importance of compliance with the treatment plan as well as documenting on the day of the visit.     Subjective:   HPI:  Follow up of Hospital problems/diagnosis(es):   Discharge Diagnoses:   Intractable tension-type headache  Principal Problem:    Intractable tension-type headache  Active Problems:    Intractable headache  Resolved Problems:    * No resolved hospital problems.       Result Date: 5/6/2024  CT Angiography Neck W Contrast 3D Postprocessing COMPARISON: None FINDINGS: No occlusion, hemodynamically significant stenosis, or dissection in the bilateral common carotid arteries. No occlusion, hemodynamically significant stenosis, or dissection in the bilateral internal carotid arteries (0-49 percent). No occlusion, hemodynamically significant stenosis, or dissection in the bilateral vertebral arteries. No acute fracture. Atelectasis and/or scarring in the upper lungs. Right thyroid nodule measuring less than 1.5 cm. No imaging follow-up indicated per ACR

## 2024-05-15 ENCOUNTER — CARE COORDINATION (OUTPATIENT)
Dept: CASE MANAGEMENT | Age: 79
End: 2024-05-15

## 2024-05-15 NOTE — CARE COORDINATION
Care Transitions Follow Up Call:Final Call    Patient Current Location:  Home: [address] [address]    Care Transition Nurse contacted the patient by telephone to follow up after admission on 24.  Verified name and  with patient as identifiers.    Patient: Jenifer Zabala  Patient : 1945   MRN: [id number]  Reason for Admission: intractable headache  Discharge Date: 24 RARS: No data recorded    Needs to be reviewed by the provider   Additional needs identified to be addressed with provider: No  none             Method of communication with provider: none.    Jenifer returned CTN call today saying she is feeling good. Denies headache, n/v/d, visual disturbances, neck pain, dizziness, sob, chest pain, swelling, malaise, fever, chills.  Works part time at [employer].   PCP HFU 24-> no changes.  Eating, drinking & sleeping well. Says is drinking 64 oz. Water every day.  Activity back to normal.  Denies problems w/ urination/bowels.  Politely declines future CTN calls but appreciative of the care.  Final call.      Addressed changes since last contact:  none  Discussed follow-up appointments. If no appointment was previously scheduled, appointment scheduling offered: Yes.   Is follow up appointment scheduled within 7 days of discharge? Yes.    Follow Up  Future Appointments   Date Time Provider Department Center   2024  1:00 PM Louisa Veras APRN - CNP [facility]   2024  2:00 PM Honorio Paige MD [facility]     External follow up appointment(s):     Care Transition Nurse reviewed discharge instructions, medical action plan, and red flags with patient and discussed any barriers to care and/or understanding of plan of care after discharge. Discussed appropriate site of care based on symptoms and resources available to patient including: PCP  Specialist  Urgent care clinics  When to call 911. The patient agrees to contact the PCP office for

## 2024-05-30 ENCOUNTER — NURSE ONLY (OUTPATIENT)
Dept: LAB | Age: 79
End: 2024-05-30

## 2024-05-30 ENCOUNTER — OFFICE VISIT (OUTPATIENT)
Dept: FAMILY MEDICINE CLINIC | Age: 79
End: 2024-05-30

## 2024-05-30 VITALS
HEIGHT: 64 IN | BODY MASS INDEX: 21.48 KG/M2 | WEIGHT: 125.8 LBS | HEART RATE: 70 BPM | SYSTOLIC BLOOD PRESSURE: 128 MMHG | DIASTOLIC BLOOD PRESSURE: 72 MMHG | RESPIRATION RATE: 16 BRPM | TEMPERATURE: 98.6 F | OXYGEN SATURATION: 98 %

## 2024-05-30 DIAGNOSIS — R35.0 URINARY FREQUENCY: Primary | ICD-10-CM

## 2024-05-30 DIAGNOSIS — R35.0 URINARY FREQUENCY: ICD-10-CM

## 2024-05-30 PROBLEM — M19.041 PRIMARY OSTEOARTHRITIS, RIGHT HAND: Status: ACTIVE | Noted: 2023-09-21

## 2024-05-30 NOTE — PROGRESS NOTES
[facility]  [facility]  [address] [address]  Dept: [phone]  Dept Fax: [phone]  Loc: [phone]     Visit Date:  5/30/2024      Patient:  Jenifer Zabala  YOB: 1945    HPI:     Chief Complaint   Patient presents with    Urinary Frequency     Urinary frequency and urgency has had it for a week and  a half she was taking OTC: medication and doesn't seem to help. Denies any fever or blood in her urine        Pt presents to the office today for 2 weeks of urinary frequency.  She denies any pain or fevers.  She has been increasing fluids and taking AZO at home with some relief.      Urinary Frequency   This is a new problem. Episode onset: 2 weeks. The problem has been waxing and waning. The patient is experiencing no pain. There has been no fever. Associated symptoms include frequency and urgency. Pertinent negatives include no chills, discharge, flank pain, hematuria, hesitancy, nausea, possible pregnancy, sweats or vomiting. She has tried increased fluids and home medications for the symptoms. The treatment provided moderate relief. Her past medical history is significant for recurrent UTIs. There is no history of catheterization, kidney stones, urinary stasis or a urological procedure.       2 weeks frequency.      Medications    Current Outpatient Medications:     [employer] TRIPLE MAGNESIUM COMPLEX PO, Take 1 capsule by mouth 2 times daily (before meals) If bowels are firm, Disp: , Rfl:     albuterol sulfate HFA (VENTOLIN HFA) 108 (90 Base) MCG/ACT inhaler, Inhale 2 puffs into the lungs 4 times daily as needed for Wheezing, Disp: 18 g, Rfl: 0    fluticasone (FLONASE) 50 MCG/ACT nasal spray, 1 spray by Each Nostril route daily as needed for Rhinitis or Allergies, Disp: 32 g, Rfl: 0    Methylcobalamin 1 MG CHEW, Take by mouth, Disp: , Rfl:     Nutritional Supplements (DHEA PO), Take 20 mg by mouth every other day, Disp: , Rfl:

## 2024-05-31 ENCOUNTER — TELEPHONE (OUTPATIENT)
Dept: FAMILY MEDICINE CLINIC | Age: 79
End: 2024-05-31

## 2024-05-31 ASSESSMENT — ENCOUNTER SYMPTOMS
VOMITING: 0
NAUSEA: 0

## 2024-05-31 NOTE — TELEPHONE ENCOUNTER
Spoke to pt and notified her of the result and WS recommendations and she verbalized understanding. She will increase fluids over the weekend and call the office next week if symptoms don't resolve.

## 2024-05-31 NOTE — TELEPHONE ENCOUNTER
----- Message from MATTHEW Cordero - CNP sent at 5/31/2024  7:55 AM EDT -----  Please let pt know that her urine was normal, no bacteria seen.  Increase fluids and follow up in office for any ongoing issues. -WS

## 2024-06-26 NOTE — PATIENT INSTRUCTIONS
----- Message from ADALBERTO Dias - CNP sent at 6/26/2024  8:01 AM EDT -----  GC/Chlamydia pending  +BV - cleocin 300mg PO BID x7 days   nuts and seeds, soy products (like tofu), and fat-free or low-fat dairy products. · Replace butter, margarine, and hydrogenated or partially hydrogenated oils with olive and canola oils. (Canola oil margarine without trans fat is fine.)  · Replace red meat with fish, poultry, and soy protein (like tofu). · Limit processed and packaged foods like chips, crackers, and cookies. · Bake, broil, or steam foods. Don't laguna them. · Be physically active. Get at least 30 minutes of exercise on most days of the week. Walking is a good choice. You also may want to do other activities, such as running, swimming, cycling, or playing tennis or team sports. · Stay at a healthy weight or lose weight by making the changes in eating and physical activity listed above. Losing just a small amount of weight, even 5 to 10 pounds, can reduce your risk for having a heart attack or stroke. · Do not smoke. When should you call for help? Watch closely for changes in your health, and be sure to contact your doctor if:    · You need help making lifestyle changes.     · You have questions about your medicine. Where can you learn more? Go to [email or web address]. org and sign in to your [employer] account. Enter Q030 in the [facility] box to learn more about \"High Cholesterol: Care Instructions. \"     If you do not have an account, please click on the \"Sign Up Now\" link. Current as of: May 10, 2017  Content Version: 11.7  © 0067-9742 [employer]. Care instructions adapted under license by [facility] ([facility]). If you have questions about a medical condition or this instruction, always ask your healthcare professional. [place] [address] any warranty or liability for your use of this information. Patient Education        Learning About High Cholesterol  What is high cholesterol? Cholesterol is a type of fat in your blood.  It is needed for many body functions, such as making new

## 2024-08-28 ENCOUNTER — OFFICE VISIT (OUTPATIENT)
Dept: FAMILY MEDICINE CLINIC | Age: 79
End: 2024-08-28

## 2024-08-28 VITALS
HEIGHT: 64 IN | SYSTOLIC BLOOD PRESSURE: 128 MMHG | WEIGHT: 121.8 LBS | HEART RATE: 72 BPM | RESPIRATION RATE: 16 BRPM | DIASTOLIC BLOOD PRESSURE: 60 MMHG | BODY MASS INDEX: 20.79 KG/M2 | TEMPERATURE: 98.1 F

## 2024-08-28 DIAGNOSIS — U07.1 COVID-19: ICD-10-CM

## 2024-08-28 DIAGNOSIS — R05.1 ACUTE COUGH: Primary | ICD-10-CM

## 2024-08-28 LAB
Lab: ABNORMAL
QC PASS/FAIL: ABNORMAL
SARS-COV-2 RDRP RESP QL NAA+PROBE: POSITIVE

## 2024-08-28 RX ORDER — BENZONATATE 100 MG/1
100 CAPSULE ORAL 3 TIMES DAILY PRN
Qty: 30 CAPSULE | Refills: 0 | Status: SHIPPED | OUTPATIENT
Start: 2024-08-28 | End: 2024-09-07

## 2024-08-28 ASSESSMENT — ENCOUNTER SYMPTOMS
RHINORRHEA: 1
SORE THROAT: 1
HEMOPTYSIS: 0
WHEEZING: 0
SHORTNESS OF BREATH: 0
HEARTBURN: 0
COUGH: 1

## 2024-08-28 NOTE — PROGRESS NOTES
[facility] PROFESSIONAL SERVS  [facility]  [address] [facility] [address] [address]  Dept: [phone]  Dept Fax: [phone]  Loc: [phone]     Visit Date:  8/28/2024      Patient:  Jenifer Zabala  YOB: 1945    HPI:     Chief Complaint   Patient presents with    Otalgia     Since yesterday PT states pain is not as bad as yesterday     Cough     Non productive since yesterday    Pharyngitis     Since yesterday        Pt presents to the office today for cough, congestion and sinus.  She reports that this started about 2 days ago.  She denies any fever or chills.     Cough  This is a new problem. The current episode started in the past 7 days. The problem has been gradually worsening. The cough is Non-productive. Associated symptoms include ear pain, headaches, nasal congestion, postnasal drip, rhinorrhea and a sore throat. Pertinent negatives include no chest pain, chills, fever, heartburn, hemoptysis, myalgias, rash, shortness of breath, sweats, weight loss or wheezing. The symptoms are aggravated by lying down. She has tried rest, OTC cough suppressant, body position changes and cool air for the symptoms. The treatment provided mild relief. Her past medical history is significant for environmental allergies. There is no history of asthma, bronchiectasis, bronchitis, emphysema or pneumonia.       Medications    Current Outpatient Medications:     nirmatrelvir/ritonavir 300/100 (PAXLOVID, 300/100,) 20 x 150 MG & 10 x 100MG TBPK, Take 3 tablets (two 150 mg nirmatrelvir and one 100 mg ritonavir tablets) by mouth every 12 hours for 5 days., Disp: 30 tablet, Rfl: 0    benzonatate (TESSALON) 100 MG capsule, Take 1 capsule by mouth 3 times daily as needed for Cough, Disp: 30 capsule, Rfl: 0    CVS TRIPLE MAGNESIUM COMPLEX PO, Take 1 capsule by mouth 2 times daily (before meals) If bowels are firm, Disp: , Rfl:     albuterol sulfate HFA (VENTOLIN HFA) 108 (90 Base) MCG/ACT  NAAT    COVID-19  -     nirmatrelvir/ritonavir 300/100 (PAXLOVID, 300/100,) 20 x 150 MG & 10 x 100MG TBPK; Take 3 tablets (two 150 mg nirmatrelvir and one 100 mg ritonavir tablets) by mouth every 12 hours for 5 days.  -     benzonatate (TESSALON) 100 MG capsule; Take 1 capsule by mouth 3 times daily as needed for Cough    - Rest and increase fluids  - Tylenol as needed for pain and fever  - COVID testing in office was positive.    - Wear mask around  if possible to prevent transmission.  - Good handwashing and clean all surfaces touched  - Call office with any questions or concerns, or if symptoms are getting worse or changing  - ER for any chest pain or SOB      Return if symptoms worsen or fail to improve.    Patient given educational materials - see patient instructions.  Discussed use, benefit, and side effects of prescribed medications.  All patient questions answered.  Pt voiced understanding.        Electronically signed by MATTHEW RODRIGUEZ CNP on 8/28/2024 at 2:59 PM

## 2024-09-02 ENCOUNTER — APPOINTMENT (OUTPATIENT)
Dept: GENERAL RADIOLOGY | Age: 79
End: 2024-09-02
Payer: MEDICARE

## 2024-09-02 ENCOUNTER — HOSPITAL ENCOUNTER (EMERGENCY)
Age: 79
Discharge: HOME | End: 2024-09-02
Attending: EMERGENCY MEDICINE
Payer: MEDICARE

## 2024-09-02 VITALS
TEMPERATURE: 98 F | RESPIRATION RATE: 16 BRPM | WEIGHT: 121 LBS | HEART RATE: 76 BPM | OXYGEN SATURATION: 95 % | SYSTOLIC BLOOD PRESSURE: 143 MMHG | DIASTOLIC BLOOD PRESSURE: 91 MMHG | BODY MASS INDEX: 20.77 KG/M2

## 2024-09-02 DIAGNOSIS — M16.12 ARTHRITIS OF LEFT HIP: ICD-10-CM

## 2024-09-02 DIAGNOSIS — G44.209 ACUTE NON INTRACTABLE TENSION-TYPE HEADACHE: Primary | ICD-10-CM

## 2024-09-02 PROCEDURE — 99284 EMERGENCY DEPT VISIT MOD MDM: CPT

## 2024-09-02 PROCEDURE — 6360000002 HC RX W HCPCS

## 2024-09-02 PROCEDURE — 96372 THER/PROPH/DIAG INJ SC/IM: CPT

## 2024-09-02 PROCEDURE — 73502 X-RAY EXAM HIP UNI 2-3 VIEWS: CPT

## 2024-09-02 RX ORDER — KETOROLAC TROMETHAMINE 30 MG/ML
15 INJECTION, SOLUTION INTRAMUSCULAR; INTRAVENOUS ONCE
Status: COMPLETED | OUTPATIENT
Start: 2024-09-02 | End: 2024-09-02

## 2024-09-02 RX ADMIN — KETOROLAC TROMETHAMINE 15 MG: 30 INJECTION, SOLUTION INTRAMUSCULAR at 17:52

## 2024-09-02 ASSESSMENT — PAIN SCALES - GENERAL: PAINLEVEL_OUTOF10: 8

## 2024-09-02 ASSESSMENT — PAIN - FUNCTIONAL ASSESSMENT: PAIN_FUNCTIONAL_ASSESSMENT: 0-10

## 2024-09-02 NOTE — ED PROVIDER NOTES
[facility] EMERGENCY DEPT  EMERGENCY DEPARTMENT ENCOUNTER          Pt Name: Jenifer Zabala  MRN: [id number]  Birthdate 1945  Date of evaluation: 9/2/2024  Physician: Anegla Munoz MD  Supervising Attending Physician: Antonino Mosquera MD       CHIEF COMPLAINT       Chief Complaint   Patient presents with    Headache         HISTORY OF PRESENT ILLNESS    HPI  Jenifer Zabala is a 79 y.o. female who presents to the emergency department from home, by private vehicle for evaluation of headache    Patient presents complaining of a headache noted in the frontal and occipital regions.  Started today at 6 AM after waking up from sleep.  She took initially Tylenol than ibuprofen which took some of the edge off but the pain is still there.  She does also report some intermittent left-sided hip pain and wants to get an x-ray for it.  Patient denies any photophobia phonophobia any weakness numbness or blurring of vision.  No falls or injuries.  Not on any anticoagulation.  The patient has no other acute complaints at this time.      REVIEW OF SYSTEMS   Review of Systems      PAST MEDICAL AND SURGICAL HISTORY     Past Medical History:   Diagnosis Date    Cancer (HCC)      Past Surgical History:   Procedure Laterality Date    BREAST SURGERY      FINGER TRIGGER RELEASE Right 2017         MEDICATIONS     Current Facility-Administered Medications:     ketorolac (TORADOL) injection 15 mg, 15 mg, IntraMUSCular, Once, Angela Munoz MD    Current Outpatient Medications:     nirmatrelvir/ritonavir 300/100 (PAXLOVID, 300/100,) 20 x 150 MG & 10 x 100MG TBPK, Take 3 tablets (two 150 mg nirmatrelvir and one 100 mg ritonavir tablets) by mouth every 12 hours for 5 days., Disp: 30 tablet, Rfl: 0    benzonatate (TESSALON) 100 MG capsule, Take 1 capsule by mouth 3 times daily as needed for Cough, Disp: 30 capsule, Rfl: 0    CVS TRIPLE MAGNESIUM COMPLEX PO, Take 1 capsule by mouth 2 times daily (before meals) If bowels are firm, Disp: ,

## 2024-09-02 NOTE — DISCHARGE INSTRUCTIONS
Take tylenol or motrin as needed for pain, always take motrin with a meal and plenty of fluids. Avoid taking for longer than 5 days.     Return to the emergency department immediately if there is any new or concerning symptom.

## 2024-09-02 NOTE — ED TRIAGE NOTES
Pt presents to the ED for evaluation of a headache that started this morning. She took 400mg of ibuprofen at 1600 today which brought her pain from a 10/10 down to an 8/10 where it is now. She is also reporting some left hip pain today. She denies recent falls, chest pain, or shortness of breath.

## 2024-09-02 NOTE — ED PROVIDER NOTES
PATIENT NAME: Jenifer Zabala  MRN: [id number]  : 1945  KOLB: 2024    I performed a history and physical examination of the patient and discussed management with the Resident. I reviewed the Resident's note and agree with the documented findings and plan of care. Any areas of disagreement are noted on the chart. I was personally present for the key portions of any procedures and have documented in the chart those procedures where I was not present during the key portions. I have reviewed the emergency nurses triage note and agree with the chief complaint, past medical history, past surgical history, allergies, medications, social and family history as documented unless otherwise noted below.    MEDICAL DEDISION MAKING (MDM)     Jenifer Zabala is a 79 y.o. female who presents to Emergency Department with Headache     Chronic head for a while which became worse since 6 AM today.  Also complaining chronic left hip pain due to OA, and she wants a left hip x-ray  Admitted in 2024 for similar headache with negative CNS workups including normal CT head as well as CTA head and neck.  No fever or chills.  No neck pain.  No blurry vision.  No slurred speech  Physical exam: AVSS. Nontoxic appearing. Heart: RRR, S1 and S2. Lungs CTAB. Soft abdomen w/o tenderness. Neurologically intact. No skin rashes.   Complete hide resolution with IV.  Discharged with PCP follow up in 1 week.    Vitals:    24 1722   BP: (!) 143/91   Pulse: 76   Resp: 16   Temp: 98 °F (36.7 °C)   SpO2: 95%   Weight: 54.9 kg (121 lb)     Labs Reviewed - No data to display  XR HIP 2-3 VW W PELVIS LEFT   Final Result      No fracture or dislocation.            **This report has been created using voice recognition software. It may contain   minor errors which are inherent in voice recognition technology.**         Electronically signed by Dr. Kennedy Goodman        Medications   ketorolac (TORADOL) injection 15 mg (15 mg IntraMUSCular Given

## 2024-10-08 ENCOUNTER — OFFICE VISIT (OUTPATIENT)
Dept: FAMILY MEDICINE CLINIC | Age: 79
End: 2024-10-08

## 2024-10-08 VITALS
TEMPERATURE: 97.8 F | BODY MASS INDEX: 20.39 KG/M2 | HEART RATE: 74 BPM | WEIGHT: 118.8 LBS | DIASTOLIC BLOOD PRESSURE: 60 MMHG | SYSTOLIC BLOOD PRESSURE: 98 MMHG | RESPIRATION RATE: 16 BRPM

## 2024-10-08 DIAGNOSIS — M25.552 LEFT HIP PAIN: Primary | ICD-10-CM

## 2024-10-08 ASSESSMENT — ENCOUNTER SYMPTOMS
COUGH: 0
NAUSEA: 0
DIARRHEA: 0
SORE THROAT: 0
ANAL BLEEDING: 0
SHORTNESS OF BREATH: 0
ABDOMINAL PAIN: 0
COLOR CHANGE: 0
ABDOMINAL DISTENTION: 0
BLOOD IN STOOL: 0
CONSTIPATION: 0
EYE DISCHARGE: 0
EYE REDNESS: 0
RHINORRHEA: 0

## 2024-10-08 NOTE — PROGRESS NOTES
Louisa Veras APRN - CNP [facility] Medicine [facility] ECC DEP      Patient given educational materials - see patient instructions.  Discussed use, benefit, and side effects of prescribedmedications.  All patient questions answered.  Pt voiced understanding. Reviewed health maintenance.  Instructed to continue current medications, diet and exercise.  Patient agreed with treatment plan. Follow up as directed.    Electronically signed by MATTHEW Justice CNP on 10/8/2024 at 12:37 PM

## 2024-10-29 ENCOUNTER — LAB (OUTPATIENT)
Dept: LAB | Age: 79
End: 2024-10-29

## 2024-10-29 DIAGNOSIS — R30.0 DYSURIA: ICD-10-CM

## 2024-10-29 DIAGNOSIS — R39.15 URINARY URGENCY: ICD-10-CM

## 2024-10-29 DIAGNOSIS — C50.011 MALIGNANT NEOPLASM OF NIPPLE OF RIGHT BREAST IN FEMALE, UNSPECIFIED ESTROGEN RECEPTOR STATUS (HCC): ICD-10-CM

## 2024-10-29 DIAGNOSIS — E55.9 VITAMIN D DEFICIENCY, UNSPECIFIED: ICD-10-CM

## 2024-10-29 DIAGNOSIS — K90.89 OTHER INTESTINAL MALABSORPTION: ICD-10-CM

## 2024-10-29 DIAGNOSIS — J44.9 CHRONIC OBSTRUCTIVE PULMONARY DISEASE, UNSPECIFIED COPD TYPE (HCC): ICD-10-CM

## 2024-10-29 DIAGNOSIS — R68.89: ICD-10-CM

## 2024-10-29 DIAGNOSIS — E78.2 MIXED HYPERLIPIDEMIA: ICD-10-CM

## 2024-10-29 DIAGNOSIS — R42 DIZZINESS: ICD-10-CM

## 2024-10-29 DIAGNOSIS — R79.9 ABNORMAL FINDING OF BLOOD CHEMISTRY, UNSPECIFIED: ICD-10-CM

## 2024-10-29 LAB
25(OH)D3 SERPL-MCNC: 47 NG/ML (ref 30–100)
BASOPHILS ABSOLUTE: 0 THOU/MM3 (ref 0–0.1)
BASOPHILS NFR BLD AUTO: 0.7 %
CALCIUM SERPL-MCNC: 10.2 MG/DL (ref 8.5–10.5)
CHOLEST SERPL-MCNC: 276 MG/DL (ref 100–199)
DEPRECATED MEAN GLUCOSE BLD GHB EST-ACNC: 102 MG/DL (ref 70–126)
DEPRECATED RDW RBC AUTO: 50.9 FL (ref 35–45)
EOSINOPHIL NFR BLD AUTO: 0.7 %
EOSINOPHILS ABSOLUTE: 0 THOU/MM3 (ref 0–0.4)
ERYTHROCYTE [DISTWIDTH] IN BLOOD BY AUTOMATED COUNT: 15.1 % (ref 11.5–14.5)
ERYTHROCYTE [SEDIMENTATION RATE] IN BLOOD BY WESTERGREN METHOD: 5 MM/HR (ref 0–20)
HBA1C MFR BLD HPLC: 5.4 % (ref 4.4–6.4)
HCT VFR BLD AUTO: 40.9 % (ref 37–47)
HDLC SERPL-MCNC: 97 MG/DL
HGB BLD-MCNC: 13.7 GM/DL (ref 12–16)
IMM GRANULOCYTES # BLD AUTO: 0.02 THOU/MM3 (ref 0–0.07)
IMM GRANULOCYTES NFR BLD AUTO: 0.3 %
LDLC SERPL CALC-MCNC: 164 MG/DL
LYMPHOCYTES ABSOLUTE: 1.5 THOU/MM3 (ref 1–4.8)
LYMPHOCYTES NFR BLD AUTO: 24.7 %
MAGNESIUM SERPL-MCNC: 2.7 MG/DL (ref 1.6–2.4)
MCH RBC QN AUTO: 30.9 PG (ref 26–33)
MCHC RBC AUTO-ENTMCNC: 33.5 GM/DL (ref 32.2–35.5)
MCV RBC AUTO: 92.3 FL (ref 81–99)
MONOCYTES ABSOLUTE: 0.5 THOU/MM3 (ref 0.4–1.3)
MONOCYTES NFR BLD AUTO: 7.6 %
NEUTROPHILS ABSOLUTE: 4 THOU/MM3 (ref 1.8–7.7)
NEUTROPHILS NFR BLD AUTO: 66 %
NRBC BLD AUTO-RTO: 0 /100 WBC
PLATELET # BLD AUTO: 270 THOU/MM3 (ref 130–400)
PMV BLD AUTO: 10.3 FL (ref 9.4–12.4)
PTH-INTACT SERPL-MCNC: 13 PG/ML (ref 15–65)
RBC # BLD AUTO: 4.43 MILL/MM3 (ref 4.2–5.4)
TRIGL SERPL-MCNC: 73 MG/DL (ref 0–199)
WBC # BLD AUTO: 6.1 THOU/MM3 (ref 4.8–10.8)

## 2024-10-30 LAB
C-REACTIVE PROTEIN, HIGH SENSITIVITY: 0.7 MG/L (ref 0–3)
HOMOCYSTEINE: 10.3 UMOL/L (ref 0–15)
SHBG SERPL-SCNC: 121 NMOL/L (ref 17–125)
TESTOST FREE MFR SERPL: < 1 PG/ML (ref 0.6–3.8)
TESTOST SERPL-MCNC: 13 NG/DL (ref 3–41)

## 2024-10-31 LAB — DHEA-S SERPL-MCNC: 175 UG/DL (ref 12–154)

## 2024-11-02 LAB — THYROPEROXIDASE AB SERPL IA-ACNC: < 4 IU/ML (ref 0–25)

## 2024-11-05 LAB — DHEA SERPL-MCNC: 1.33 NG/ML (ref 0.63–4.7)

## 2024-11-06 NOTE — PROGRESS NOTES
Olive Leaf Extract 250 MG CAPS, Take 1 capsule by mouth 2 times daily, Disp: , Rfl:     Cetirizine HCl 10 MG CAPS, Take by mouth nightly , Disp: , Rfl:     loratadine (CLARITIN) 10 MG capsule, Take 1 capsule by mouth as needed, Disp: , Rfl:   Allergies: Gluten, Fructose, Gluten meal, Orange oil, Pcn [penicillins], and Sugar-protein-starch  Immunizations:   Immunization History   Administered Date(s) Administered    COVID-19, MODERNA BLUE border, Primary or Immunocompromised, (age 12y+), IM, 100 mcg/0.5mL 02/19/2021, 02/19/2021, 02/19/2021, 03/19/2021, 03/19/2021, 03/19/2021    COVID-19, PFIZER PURPLE top, DILUTE for use, (age 12 y+), 30mcg/0.3mL 11/20/2021    Influenza Whole 12/05/2008    Influenza, FLUAD, (age 65 y+), IM, Quadv, 0.5mL 10/06/2021, 09/15/2022, 10/17/2023    Influenza, FLUCELVAX, (age 6 mo+), MDCK, Quadv MDV, 0.5mL 10/17/2019, 10/17/2019    Influenza, FLUZONE High Dose (age 65 y+), IM, Quadv, 0.7mL 08/17/2020    Influenza, FLUZONE High Dose, (age 65 y+), IM, Trivalent PF, 0.5mL 12/07/2016, 10/24/2017, 10/11/2018, 08/17/2020, 09/19/2024    Pneumococcal, PCV-13, PREVNAR 13, (age 6w+), IM, 0.5mL 11/05/2018    Pneumococcal, PPSV23, PNEUMOVAX 23, (age 2y+), SC/IM, 0.5mL 11/03/2020    TDaP, ADACEL (age 10y-64y), BOOSTRIX (age 10y+), IM, 0.5mL 10/12/2020    Zoster Recombinant (Shingrix) 05/31/2024, 09/19/2024        History of Present Illness:     Jenifer's had concerns including Discuss Labs..Jenifer  presents to the [facility] today for;   Chief Complaint   Patient presents with    Discuss Labs   , abnormal labs follow up and these conditions as she  Is looking today for:     1. Left hip pain    2. Urinary frequency    3. Intractable headache, unspecified chronicity pattern, unspecified headache type    4. Dysuria    5. Urinary urgency    6. Chronic obstructive pulmonary disease, unspecified COPD type (HCC)    7. Vitamin D deficiency, unspecified    8. Malignant neoplasm of

## 2024-11-07 ENCOUNTER — TELEMEDICINE (OUTPATIENT)
Dept: FAMILY MEDICINE CLINIC | Age: 79
End: 2024-11-07

## 2024-11-07 DIAGNOSIS — R51.9 INTRACTABLE HEADACHE, UNSPECIFIED CHRONICITY PATTERN, UNSPECIFIED HEADACHE TYPE: ICD-10-CM

## 2024-11-07 DIAGNOSIS — E78.2 MIXED HYPERLIPIDEMIA: ICD-10-CM

## 2024-11-07 DIAGNOSIS — R42 DIZZINESS: ICD-10-CM

## 2024-11-07 DIAGNOSIS — R39.15 URINARY URGENCY: ICD-10-CM

## 2024-11-07 DIAGNOSIS — K90.89 OTHER INTESTINAL MALABSORPTION: ICD-10-CM

## 2024-11-07 DIAGNOSIS — R79.9 ABNORMAL FINDING OF BLOOD CHEMISTRY, UNSPECIFIED: ICD-10-CM

## 2024-11-07 DIAGNOSIS — M25.552 LEFT HIP PAIN: Primary | ICD-10-CM

## 2024-11-07 DIAGNOSIS — C50.011 MALIGNANT NEOPLASM OF NIPPLE OF RIGHT BREAST IN FEMALE, UNSPECIFIED ESTROGEN RECEPTOR STATUS (HCC): ICD-10-CM

## 2024-11-07 DIAGNOSIS — E55.9 VITAMIN D DEFICIENCY, UNSPECIFIED: ICD-10-CM

## 2024-11-07 DIAGNOSIS — R30.0 DYSURIA: ICD-10-CM

## 2024-11-07 DIAGNOSIS — R35.0 URINARY FREQUENCY: ICD-10-CM

## 2024-11-07 DIAGNOSIS — J44.9 CHRONIC OBSTRUCTIVE PULMONARY DISEASE, UNSPECIFIED COPD TYPE (HCC): ICD-10-CM

## 2024-11-20 ENCOUNTER — TELEPHONE (OUTPATIENT)
Dept: FAMILY MEDICINE CLINIC | Age: 79
End: 2024-11-20

## 2024-12-11 SDOH — HEALTH STABILITY: PHYSICAL HEALTH: ON AVERAGE, HOW MANY DAYS PER WEEK DO YOU ENGAGE IN MODERATE TO STRENUOUS EXERCISE (LIKE A BRISK WALK)?: 6 DAYS

## 2024-12-11 SDOH — HEALTH STABILITY: PHYSICAL HEALTH: ON AVERAGE, HOW MANY MINUTES DO YOU ENGAGE IN EXERCISE AT THIS LEVEL?: 30 MIN

## 2024-12-11 ASSESSMENT — LIFESTYLE VARIABLES
HOW MANY STANDARD DRINKS CONTAINING ALCOHOL DO YOU HAVE ON A TYPICAL DAY: PATIENT DOES NOT DRINK
HOW OFTEN DO YOU HAVE A DRINK CONTAINING ALCOHOL: 1
HOW MANY STANDARD DRINKS CONTAINING ALCOHOL DO YOU HAVE ON A TYPICAL DAY: 0
HOW OFTEN DO YOU HAVE A DRINK CONTAINING ALCOHOL: NEVER
HOW OFTEN DO YOU HAVE SIX OR MORE DRINKS ON ONE OCCASION: 1

## 2024-12-11 ASSESSMENT — PATIENT HEALTH QUESTIONNAIRE - PHQ9
SUM OF ALL RESPONSES TO PHQ QUESTIONS 1-9: 0
SUM OF ALL RESPONSES TO PHQ QUESTIONS 1-9: 0
1. LITTLE INTEREST OR PLEASURE IN DOING THINGS: NOT AT ALL
SUM OF ALL RESPONSES TO PHQ9 QUESTIONS 1 & 2: 0
2. FEELING DOWN, DEPRESSED OR HOPELESS: NOT AT ALL
SUM OF ALL RESPONSES TO PHQ QUESTIONS 1-9: 0
SUM OF ALL RESPONSES TO PHQ QUESTIONS 1-9: 0

## 2024-12-12 ENCOUNTER — OFFICE VISIT (OUTPATIENT)
Dept: FAMILY MEDICINE CLINIC | Age: 79
End: 2024-12-12

## 2024-12-12 VITALS
DIASTOLIC BLOOD PRESSURE: 60 MMHG | SYSTOLIC BLOOD PRESSURE: 124 MMHG | RESPIRATION RATE: 16 BRPM | WEIGHT: 121.2 LBS | HEART RATE: 80 BPM | HEIGHT: 64 IN | BODY MASS INDEX: 20.69 KG/M2

## 2024-12-12 DIAGNOSIS — Z12.31 BREAST CANCER SCREENING BY MAMMOGRAM: ICD-10-CM

## 2024-12-12 DIAGNOSIS — Z00.00 MEDICARE ANNUAL WELLNESS VISIT, SUBSEQUENT: Primary | ICD-10-CM

## 2024-12-12 DIAGNOSIS — E78.2 MIXED HYPERLIPIDEMIA: ICD-10-CM

## 2024-12-12 NOTE — PROGRESS NOTES
Medicare Annual Wellness Visit    Jenifer Zabala is here for Medicare AWV (No concerns at this time.)    Assessment & Plan   Medicare annual wellness visit, subsequent  Breast cancer screening by mammogram  -     ADRIAN DIGITAL SCREEN W OR WO CAD BILATERAL; Future  Mixed hyperlipidemia    Recommendations for Preventive Services Due: see orders and patient instructions/AVS.  Recommended screening schedule for the next 5-10 years is provided to the patient in written form: see Patient Instructions/AVS.     Return in 1 year (on 12/12/2025) for Annual Physical.     Subjective     Wellness visit:    Here today for an annual wellness exam. DIET: eats 3 meals per day and 1 snacks per day.  She does exercise regularly. Physical activities include: trampoline, stretching, weights, squats - exercises 6 days weekly. She dose take over the counter vitamins or supplements.  The patient has ever had a blood transfusion No. Any tattoos No ?    She wears her seatbelts while riding a car. She performs all of her ADL's without problem.  She is independent, she cooks, drives, bathes, and gets dressed without assistance. She is . She has 2 children.  She does work. She works 32 hours a week.      She is not current on  COVID,RSV .  She is not a smoker. Smokes 0 packs per day.  Patient does not consume alcoholic beverages on a regular basis.      She does perform regular breast self exams.    She has had a colonoscopy - It was normal - done 2019 Her last mammogram was 12/28/2023 - it was IMPRESSION:    Stable bilateral screening mammogram.  Yearly follow-up mammogram    recommended.  She has had a Bone Density: 12/2023:  IMPRESSION:    1.  The patient demonstrates osteopenia of both hips.    2.  The patient demonstrates osteopenia of the lumbar spine. .      Last Dental visit: within 6 months  Last eye exam: within a year    She  reports that she has never smoked. She has never used smokeless tobacco. She reports that she does not 
1 pair

## 2025-02-07 ENCOUNTER — HOSPITAL ENCOUNTER (OUTPATIENT)
Dept: MRI IMAGING | Age: 80
Discharge: HOME | End: 2025-02-07
Payer: MEDICARE

## 2025-02-07 DIAGNOSIS — M25.562 LEFT KNEE PAIN, UNSPECIFIED CHRONICITY: ICD-10-CM

## 2025-02-07 PROCEDURE — 73721 MRI JNT OF LWR EXTRE W/O DYE: CPT

## 2025-02-18 ENCOUNTER — LAB (OUTPATIENT)
Dept: LAB | Age: 80
End: 2025-02-18

## 2025-02-21 ENCOUNTER — LAB (OUTPATIENT)
Dept: LAB | Age: 80
End: 2025-02-21

## 2025-02-21 DIAGNOSIS — E55.9 VITAMIN D DEFICIENCY, UNSPECIFIED: ICD-10-CM

## 2025-02-21 DIAGNOSIS — K90.89 OTHER INTESTINAL MALABSORPTION: ICD-10-CM

## 2025-02-21 DIAGNOSIS — E78.2 MIXED HYPERLIPIDEMIA: ICD-10-CM

## 2025-02-21 DIAGNOSIS — R42 DIZZINESS: ICD-10-CM

## 2025-02-21 DIAGNOSIS — R51.9 INTRACTABLE HEADACHE, UNSPECIFIED CHRONICITY PATTERN, UNSPECIFIED HEADACHE TYPE: ICD-10-CM

## 2025-02-21 DIAGNOSIS — R39.15 URINARY URGENCY: ICD-10-CM

## 2025-02-21 DIAGNOSIS — C50.011 MALIGNANT NEOPLASM OF NIPPLE OF RIGHT BREAST IN FEMALE, UNSPECIFIED ESTROGEN RECEPTOR STATUS (HCC): ICD-10-CM

## 2025-02-21 DIAGNOSIS — R35.0 URINARY FREQUENCY: ICD-10-CM

## 2025-02-21 DIAGNOSIS — R30.0 DYSURIA: ICD-10-CM

## 2025-02-21 DIAGNOSIS — R79.9 ABNORMAL FINDING OF BLOOD CHEMISTRY, UNSPECIFIED: ICD-10-CM

## 2025-02-21 DIAGNOSIS — J44.9 CHRONIC OBSTRUCTIVE PULMONARY DISEASE, UNSPECIFIED COPD TYPE (HCC): ICD-10-CM

## 2025-02-21 DIAGNOSIS — M25.552 LEFT HIP PAIN: ICD-10-CM

## 2025-02-21 LAB
25(OH)D3 SERPL-MCNC: 34 NG/ML (ref 30–100)
BASOPHILS ABSOLUTE: 0 THOU/MM3 (ref 0–0.1)
BASOPHILS NFR BLD AUTO: 1.2 %
CALCIUM SERPL-MCNC: 9.2 MG/DL (ref 8.2–9.6)
CHOLEST SERPL-MCNC: 222 MG/DL (ref 100–199)
DEPRECATED RDW RBC AUTO: 45.2 FL (ref 35–45)
EOSINOPHIL NFR BLD AUTO: 10.2 %
EOSINOPHILS ABSOLUTE: 0.4 THOU/MM3 (ref 0–0.4)
ERYTHROCYTE [DISTWIDTH] IN BLOOD BY AUTOMATED COUNT: 13.2 % (ref 11.5–14.5)
HCT VFR BLD AUTO: 43.6 % (ref 37–47)
HDLC SERPL-MCNC: 91 MG/DL
HGB BLD-MCNC: 14.6 GM/DL (ref 12–16)
IMM GRANULOCYTES # BLD AUTO: 0 THOU/MM3 (ref 0–0.07)
IMM GRANULOCYTES NFR BLD AUTO: 0 %
LDLC SERPL CALC-MCNC: 120 MG/DL
LYMPHOCYTES ABSOLUTE: 1.3 THOU/MM3 (ref 1–4.8)
LYMPHOCYTES NFR BLD AUTO: 31 %
MAGNESIUM SERPL-MCNC: 2.2 MG/DL (ref 1.6–2.4)
MCH RBC QN AUTO: 30.9 PG (ref 26–33)
MCHC RBC AUTO-ENTMCNC: 33.5 GM/DL (ref 32.2–35.5)
MCV RBC AUTO: 92.4 FL (ref 81–99)
MONOCYTES ABSOLUTE: 0.4 THOU/MM3 (ref 0.4–1.3)
MONOCYTES NFR BLD AUTO: 10.7 %
NEUTROPHILS ABSOLUTE: 1.9 THOU/MM3 (ref 1.8–7.7)
NEUTROPHILS NFR BLD AUTO: 46.9 %
NRBC BLD AUTO-RTO: 0 /100 WBC
PLATELET # BLD AUTO: 276 THOU/MM3 (ref 130–400)
PMV BLD AUTO: 10.2 FL (ref 9.4–12.4)
PTH-INTACT SERPL-MCNC: 31.4 PG/ML (ref 15–65)
RBC # BLD AUTO: 4.72 MILL/MM3 (ref 4.2–5.4)
TRIGL SERPL-MCNC: 56 MG/DL (ref 0–199)
WBC # BLD AUTO: 4.1 THOU/MM3 (ref 4.8–10.8)

## 2025-03-03 ENCOUNTER — OFFICE VISIT (OUTPATIENT)
Dept: FAMILY MEDICINE CLINIC | Age: 80
End: 2025-03-03

## 2025-03-03 VITALS
TEMPERATURE: 97.7 F | HEART RATE: 84 BPM | BODY MASS INDEX: 22.23 KG/M2 | HEIGHT: 64 IN | WEIGHT: 130.2 LBS | SYSTOLIC BLOOD PRESSURE: 112 MMHG | DIASTOLIC BLOOD PRESSURE: 68 MMHG

## 2025-03-03 DIAGNOSIS — J44.9 CHRONIC OBSTRUCTIVE PULMONARY DISEASE, UNSPECIFIED COPD TYPE (HCC): ICD-10-CM

## 2025-03-03 DIAGNOSIS — R13.19 OTHER DYSPHAGIA: Primary | ICD-10-CM

## 2025-03-03 DIAGNOSIS — C50.011 MALIGNANT NEOPLASM OF NIPPLE OF RIGHT BREAST IN FEMALE, UNSPECIFIED ESTROGEN RECEPTOR STATUS (HCC): ICD-10-CM

## 2025-03-03 RX ORDER — ERGOCALCIFEROL (VITAMIN D2) 10 MCG
TABLET ORAL
COMMUNITY

## 2025-03-03 SDOH — ECONOMIC STABILITY: FOOD INSECURITY: WITHIN THE PAST 12 MONTHS, YOU WORRIED THAT YOUR FOOD WOULD RUN OUT BEFORE YOU GOT MONEY TO BUY MORE.: NEVER TRUE

## 2025-03-03 SDOH — ECONOMIC STABILITY: FOOD INSECURITY: WITHIN THE PAST 12 MONTHS, THE FOOD YOU BOUGHT JUST DIDN'T LAST AND YOU DIDN'T HAVE MONEY TO GET MORE.: NEVER TRUE

## 2025-03-03 ASSESSMENT — ENCOUNTER SYMPTOMS
SORE THROAT: 0
COLOR CHANGE: 0
SHORTNESS OF BREATH: 0
COUGH: 0
BLOOD IN STOOL: 0
ANAL BLEEDING: 0
RHINORRHEA: 0
ABDOMINAL DISTENTION: 0
EYE REDNESS: 0
NAUSEA: 0
CONSTIPATION: 0
DIARRHEA: 0
EYE DISCHARGE: 0
ABDOMINAL PAIN: 0

## 2025-03-03 ASSESSMENT — PATIENT HEALTH QUESTIONNAIRE - PHQ9
SUM OF ALL RESPONSES TO PHQ QUESTIONS 1-9: 0
SUM OF ALL RESPONSES TO PHQ QUESTIONS 1-9: 0
1. LITTLE INTEREST OR PLEASURE IN DOING THINGS: NOT AT ALL
SUM OF ALL RESPONSES TO PHQ QUESTIONS 1-9: 0
SUM OF ALL RESPONSES TO PHQ QUESTIONS 1-9: 0
2. FEELING DOWN, DEPRESSED OR HOPELESS: NOT AT ALL

## 2025-03-03 NOTE — PROGRESS NOTES
[facility]  [facility] PROFESSIONAL SERVS  [facility]  [address]  [facility] [address]  Dept: [phone]  Loc: [phone]    Visit Date: 3/3/2025    Jenifer Zabala is a 80 y.o. female who presents today for:  Chief Complaint   Patient presents with    Facial Pain     C/O headaches, sinus pressure, ear fullness and pain. Also having troubles with swallowing. Previous throat stretch helped.      HPI:     Ear pressure/ fullness - taken mucinex - helped. Ears feel full    Some swallowing difficulty - can get it down but it is more difficult - had this in tehpast - needed to have her throat stretched. Last done about 20 years ago. She is not choking - difficulty with swallowing foods - does fine with liquids    Seeing OIO - meniscus tear left knee    Mammogram scheduled - [facility] - having done Friday - hx breast cancer - no longer needing oncology follow-up  HPI  Health Maintenance   Topic Date Due    COVID-19 Vaccine (8 - 2024-25 season) 12/12/2025 (Originally 9/1/2024)    Respiratory Syncytial Virus (RSV) Pregnant or age 60 yrs+ (1 - 1-dose 75+ series) 12/12/2025 (Originally 2/5/2021)    Annual Wellness Visit (Medicare)  12/13/2025    Depression Screen  03/03/2026    DTaP/Tdap/Td vaccine (2 - Td or Tdap) 10/12/2030    DEXA (modify frequency per FRAX score)  Completed    Flu vaccine  Completed    Shingles vaccine  Completed    Pneumococcal 50+ years Vaccine  Completed    Hepatitis C screen  Completed    Hepatitis A vaccine  Aged Out    Hepatitis B vaccine  Aged Out    Hib vaccine  Aged Out    Polio vaccine  Aged Out    Meningococcal (ACWY) vaccine  Aged Out    A1C test (Diabetic or Prediabetic)  Discontinued    Lipids  Discontinued    Breast cancer screen  Discontinued    Colonoscopy  Discontinued     Past Medical History:   Diagnosis Date    Cancer (HCC)       Past Surgical History:   Procedure Laterality Date    BREAST SURGERY      FINGER TRIGGER RELEASE Right 2017     Family History   Problem

## 2025-03-21 ENCOUNTER — OFFICE VISIT (OUTPATIENT)
Dept: FAMILY MEDICINE CLINIC | Age: 80
End: 2025-03-21

## 2025-03-21 VITALS
SYSTOLIC BLOOD PRESSURE: 124 MMHG | BODY MASS INDEX: 21 KG/M2 | DIASTOLIC BLOOD PRESSURE: 66 MMHG | HEART RATE: 64 BPM | HEIGHT: 64 IN | WEIGHT: 123 LBS | RESPIRATION RATE: 16 BRPM | TEMPERATURE: 97.4 F

## 2025-03-21 DIAGNOSIS — J30.2 SEASONAL ALLERGIC RHINITIS, UNSPECIFIED TRIGGER: Primary | ICD-10-CM

## 2025-03-21 ASSESSMENT — ENCOUNTER SYMPTOMS
WHEEZING: 0
SHORTNESS OF BREATH: 0
RHINORRHEA: 1
SINUS PRESSURE: 0
COUGH: 1
SINUS PAIN: 0
VOICE CHANGE: 1

## 2025-03-21 NOTE — PROGRESS NOTES
Pneumococcal, PCV-13, PREVNAR 13, (age 6w+), IM, 0.5mL 11/05/2018    Pneumococcal, PPSV23, PNEUMOVAX 23, (age 2y+), SC/IM, 0.5mL 11/03/2020    TDaP, ADACEL (age 10y-64y), BOOSTRIX (age 10y+), IM, 0.5mL 10/12/2020    Zoster Recombinant (Shingrix) 05/31/2024, 09/19/2024         Assessment / Plan:     1. Seasonal allergic rhinitis, unspecified trigger  Chronic, worsened recently.  Symptoms to be improving.  No sign of infection.  Recommend continue Flonase 1 spray to each nostril twice daily.  Continue Claritin or Zyrtec.  Discussed if symptoms do not improve, can consider steroid injection in office to help with symptoms.  Patient would like to hold off at this time as she feels things are getting better.  She will call office if symptoms worsen.           Return if symptoms worsen or fail to improve.          Medications Prescribed:  No orders of the defined types were placed in this encounter.      Future Appointments   Date Time Provider Department Center   12/15/2025  1:40 PM Louisa Veras APRN - CNP [facility] Medicine [facility] ECC DEP       Patient given educational materials - see patient instructions.  Discussed use, benefit, and sideeffects of prescribed medications.  All patient questions answered.  Pt voiced understanding. Reviewed health maintenance.  Instructed to continue current medications, diet and exercise.  Patient agreed with treatment plan.Follow up as directed.     The patient (or guardian, if applicable) and other individuals in attendance with the patient were advised that Artificial Intelligence will be utilized during this visit to record, process the conversation to generate a clinical note, and support improvement of the AI technology. The patient (or guardian, if applicable) and other individuals in attendance at the appointment consented to the use of AI, including the recording.          Electronically signed by Jennie Ortez MD on 3/21/2025 at 11:35 AM

## 2025-04-28 ENCOUNTER — OFFICE VISIT (OUTPATIENT)
Dept: FAMILY MEDICINE CLINIC | Age: 80
End: 2025-04-28

## 2025-04-28 VITALS
HEIGHT: 64 IN | HEART RATE: 64 BPM | DIASTOLIC BLOOD PRESSURE: 60 MMHG | WEIGHT: 123 LBS | TEMPERATURE: 97.6 F | RESPIRATION RATE: 16 BRPM | SYSTOLIC BLOOD PRESSURE: 116 MMHG | BODY MASS INDEX: 21 KG/M2

## 2025-04-28 DIAGNOSIS — J30.2 SEASONAL ALLERGIC RHINITIS, UNSPECIFIED TRIGGER: Primary | ICD-10-CM

## 2025-04-28 RX ORDER — FLUTICASONE PROPIONATE 50 MCG
1 SPRAY, SUSPENSION (ML) NASAL DAILY PRN
Qty: 32 G | Refills: 1 | Status: SHIPPED | OUTPATIENT
Start: 2025-04-28

## 2025-04-28 RX ORDER — METHYLPREDNISOLONE ACETATE 40 MG/ML
60 INJECTION, SUSPENSION INTRA-ARTICULAR; INTRALESIONAL; INTRAMUSCULAR; SOFT TISSUE ONCE
Status: COMPLETED | OUTPATIENT
Start: 2025-04-28 | End: 2025-04-28

## 2025-04-28 RX ADMIN — METHYLPREDNISOLONE ACETATE 60 MG: 40 INJECTION, SUSPENSION INTRA-ARTICULAR; INTRALESIONAL; INTRAMUSCULAR; SOFT TISSUE at 10:14

## 2025-04-28 ASSESSMENT — ENCOUNTER SYMPTOMS
COUGH: 0
RHINORRHEA: 0
CONSTIPATION: 0
SINUS PAIN: 0
DIARRHEA: 0
SORE THROAT: 0
VOMITING: 0
SINUS PRESSURE: 0
SHORTNESS OF BREATH: 0
WHEEZING: 0
ABDOMINAL PAIN: 0
NAUSEA: 0

## 2025-04-28 NOTE — PROGRESS NOTES
Administrations This Visit       methylPREDNISolone acetate (DEPO-MEDROL) injection 60 mg       Admin Date  04/28/2025  10:14 Action  Given Dose  60 mg Route  IntraMUSCular Site  Dorsogluteal Right Documented By  Lizbeth Singleton LPN    NDC: 5199-8094-65    Lot#: [id number]    : PFIZER [place]    Patient Supplied?: No

## 2025-04-28 NOTE — PROGRESS NOTES
Jenifer Zabala is a 80 y.o. female who presents today for:  Chief Complaint   Patient presents with    Ear Fullness     Fluid in ears. Started two months ago.         HPI:     History of Present Illness  The patient presents for evaluation of ear issues.    Persistent ear-related symptoms have been experienced for approximately 2 months, worse over the past week. Despite using two prescriptions of Flonase nasal spray, complete resolution of the fluid accumulation in the ears has not been achieved. A sensation of fullness in the ears is reported, but there is no associated hearing impairment. Headaches localized to the frontal region are noted, without sinus pressure, visual disturbances, fever, or chills. Postnasal drainage is present. Symptoms typically manifest in October and persist for 2 to 3 weeks, but this is the first instance of such prolonged duration. No history of adverse reactions to steroids is reported. Symptom management has included cetirizine, taken once daily, and occasionally twice daily for 3 to 4 consecutive days during severe episodes. Dizziness has been experienced today, which has improved with the use of Meclizine, but a slight off-balance sensation upon standing persists.  Patient states his Bickley happens when she gets so much fluid in her ears.        Objective:     Vitals:    04/28/25 0941   BP: 116/60   BP Site: Left Upper Arm   Patient Position: Sitting   BP Cuff Size: Medium Adult   Pulse: 64   Resp: 16   Temp: 97.6 °F (36.4 °C)   TempSrc: Oral   Weight: 55.8 kg (123 lb)   Height: 1.626 m (5' 4\")       Wt Readings from Last 3 Encounters:   04/28/25 55.8 kg (123 lb)   03/21/25 55.8 kg (123 lb)   03/03/25 59.1 kg (130 lb 3.2 oz)       BP Readings from Last 3 Encounters:   04/28/25 116/60   03/21/25 124/66   03/03/25 112/68       Lab Results   Component Value Date    WBC 4.1 (L) 02/21/2025    HGB 14.6 02/21/2025    HCT 43.6 02/21/2025    MCV 92.4 02/21/2025     02/21/2025

## 2025-05-01 ENCOUNTER — TELEPHONE (OUTPATIENT)
Dept: FAMILY MEDICINE CLINIC | Age: 80
End: 2025-05-01

## 2025-05-01 NOTE — TELEPHONE ENCOUNTER
Patient seen in office 04/28/25 and given Depo-Medrol injection and Flonase nasal spray.  Calls today stating that her ear pain has not improved at all, may actually be a bit worse.   Do you want to have her come back in for reassessment or provide different treatment.  Using [facility].  CPB

## 2025-05-08 NOTE — TELEPHONE ENCOUNTER
Left message with pt letting her know if she is still experiencing symptoms, she is welcome to call back for appointment.

## 2025-05-20 DIAGNOSIS — J30.2 SEASONAL ALLERGIC RHINITIS, UNSPECIFIED TRIGGER: ICD-10-CM

## 2025-05-20 RX ORDER — FLUTICASONE PROPIONATE 50 MCG
1 SPRAY, SUSPENSION (ML) NASAL DAILY PRN
Qty: 48 ML | Refills: 2 | Status: SHIPPED | OUTPATIENT
Start: 2025-05-20

## 2025-05-20 NOTE — TELEPHONE ENCOUNTER
This medication refill is regarding a electronic request. Refill requested by patient.    Requested Prescriptions     Pending Prescriptions Disp Refills    fluticasone (FLONASE) 50 MCG/ACT nasal spray [Pharmacy Med Name: FLUTICASONE PROP 50 MCG SPRAY] 48 mL 2     Si SPRAY BY EACH NOSTRIL ROUTE DAILY AS NEEDED FOR RHINITIS OR ALLERGIES     Date of last visit: 2025   Date of next visit: Visit date not found  Date of last refill: 25  Pharmacy Name:  [facility]/pharmacy #[id number] - [facility] [address] [phone] -  [phone]       Rx verified, ordered and set to EP.

## 2025-06-24 NOTE — TELEPHONE ENCOUNTER
Advised pt labs are ordered and to go to [facility] to have drawn.
Pt had her appt scheduled for the 9 th of this week. Told her labs wont be done by then. Suggested that we move to 12/16/2021. [facility]  [facility]. Said she has been having more hand pain and thinks its arthritis. Orders pended, please review, make sure they are correct. Attach diagnosis, approve or or deny      I need to call the patient after they are ordered.
show

## 2025-07-29 NOTE — PROGRESS NOTES
flakes 14.99 Item #: [id number] if back-ordered, get spray  Magnesium threonate, Magtein also helps mental clarity and sleep    Food Drink Symptom Log;  I asked this patient to track these items and any other symptoms on their list on a weekly basis to documenttheir progress or lack of same. This can be done on the symptom tracking sheet I gave them at today's visit but looks like this:                                                      Rate on scale of 0-10 with zero = not noticeable  Symptom:                            Week 1               2                 3                 4               Etc            Hair loss    Foot cramps    Paresthesia    Aches    IBS (irritable bowel)    Constipation    Diarrhea  Nocturia (up to bathroom at night)    Fatigue/Energy level  Stress      On the other side of the sheet they can track their food, drink, environment, activity, symptoms etc      Avoiding Latex-like proteins in my foods;    Avocados, Bananas, Celery, Figs & Kiwi proteins have latex-like proteins to inflame our immune systems, plus 47 more foods  How Can I Have A Latex Allergy?  Eating foods with latex-like protein exposes us to latex allergies.  Our body cannot tell the differencebetween these latex-like proteins and latex from rubber products since many people are allergic to fruit, vegetables and latex. Read labels on pre-packaged foods. This list to avoid is only a guide if you are known allergicto latex or have a latex rash on your chin, cheeks and lines on your neck and chest. The amount of latex is different in each food product or fruit variety.  Avoid out of Season if not grown locally:   Melon, Nectarine, Papaya, Cherry, Passion fruit, Plum, Chestnuts, and Tomato. Avocado, Banana, Celery, Figs, and Kiwi always contain Latex-like protein.    What’s in Season?  Strawberries taste better in June than December because June is strawberry season so buy locally grown produce \"in season\" for the best flavor, cost,

## 2025-07-30 ENCOUNTER — OFFICE VISIT (OUTPATIENT)
Age: 80
End: 2025-07-30
Payer: MEDICARE

## 2025-07-30 VITALS
TEMPERATURE: 97.4 F | SYSTOLIC BLOOD PRESSURE: 102 MMHG | RESPIRATION RATE: 12 BRPM | DIASTOLIC BLOOD PRESSURE: 64 MMHG | OXYGEN SATURATION: 96 % | BODY MASS INDEX: 20.62 KG/M2 | HEART RATE: 73 BPM | WEIGHT: 120.8 LBS | HEIGHT: 64 IN

## 2025-07-30 DIAGNOSIS — E78.2 MIXED HYPERLIPIDEMIA: ICD-10-CM

## 2025-07-30 DIAGNOSIS — C50.012 MALIGNANT NEOPLASM INVOLVING BOTH NIPPLE AND AREOLA IN BOTH BREASTS IN FEMALE, ESTROGEN RECEPTOR NEGATIVE (HCC): ICD-10-CM

## 2025-07-30 DIAGNOSIS — C50.011 MALIGNANT NEOPLASM INVOLVING BOTH NIPPLE AND AREOLA IN BOTH BREASTS IN FEMALE, ESTROGEN RECEPTOR NEGATIVE (HCC): ICD-10-CM

## 2025-07-30 DIAGNOSIS — H90.3 BILATERAL SENSORINEURAL HEARING LOSS: ICD-10-CM

## 2025-07-30 DIAGNOSIS — E16.2 LOW GLUCOSE LEVEL: ICD-10-CM

## 2025-07-30 DIAGNOSIS — M65.332 TRIGGER FINGER, LEFT MIDDLE FINGER: ICD-10-CM

## 2025-07-30 DIAGNOSIS — G89.4 CHRONIC PAIN SYNDROME: ICD-10-CM

## 2025-07-30 DIAGNOSIS — Z17.1 MALIGNANT NEOPLASM INVOLVING BOTH NIPPLE AND AREOLA IN BOTH BREASTS IN FEMALE, ESTROGEN RECEPTOR NEGATIVE (HCC): ICD-10-CM

## 2025-07-30 DIAGNOSIS — G44.221 CHRONIC TENSION-TYPE HEADACHE, INTRACTABLE: ICD-10-CM

## 2025-07-30 DIAGNOSIS — E55.9 VITAMIN D DEFICIENCY: ICD-10-CM

## 2025-07-30 DIAGNOSIS — G44.021 INTRACTABLE CHRONIC CLUSTER HEADACHE: ICD-10-CM

## 2025-07-30 DIAGNOSIS — R53.83 OTHER FATIGUE: ICD-10-CM

## 2025-07-30 DIAGNOSIS — J43.8 OTHER EMPHYSEMA (HCC): ICD-10-CM

## 2025-07-30 DIAGNOSIS — M19.041 PRIMARY OSTEOARTHRITIS, RIGHT HAND: ICD-10-CM

## 2025-07-30 DIAGNOSIS — M18.0 ARTHRITIS OF CARPOMETACARPAL (CMC) JOINT OF BOTH THUMBS: Primary | ICD-10-CM

## 2025-07-30 PROCEDURE — 1123F ACP DISCUSS/DSCN MKR DOCD: CPT | Performed by: FAMILY MEDICINE

## 2025-07-30 PROCEDURE — G8420 CALC BMI NORM PARAMETERS: HCPCS | Performed by: FAMILY MEDICINE

## 2025-07-30 PROCEDURE — G8400 PT W/DXA NO RESULTS DOC: HCPCS | Performed by: FAMILY MEDICINE

## 2025-07-30 PROCEDURE — 3023F SPIROM DOC REV: CPT | Performed by: FAMILY MEDICINE

## 2025-07-30 PROCEDURE — 1036F TOBACCO NON-USER: CPT | Performed by: FAMILY MEDICINE

## 2025-07-30 PROCEDURE — 99214 OFFICE O/P EST MOD 30 MIN: CPT | Performed by: FAMILY MEDICINE

## 2025-07-30 PROCEDURE — 1090F PRES/ABSN URINE INCON ASSESS: CPT | Performed by: FAMILY MEDICINE

## 2025-07-30 PROCEDURE — G8427 DOCREV CUR MEDS BY ELIG CLIN: HCPCS | Performed by: FAMILY MEDICINE

## 2025-07-30 PROCEDURE — 1159F MED LIST DOCD IN RCRD: CPT | Performed by: FAMILY MEDICINE

## 2025-07-30 ASSESSMENT — PATIENT HEALTH QUESTIONNAIRE - PHQ9
SUM OF ALL RESPONSES TO PHQ QUESTIONS 1-9: 0
SUM OF ALL RESPONSES TO PHQ QUESTIONS 1-9: 0
2. FEELING DOWN, DEPRESSED OR HOPELESS: NOT AT ALL
SUM OF ALL RESPONSES TO PHQ QUESTIONS 1-9: 0
1. LITTLE INTEREST OR PLEASURE IN DOING THINGS: NOT AT ALL
SUM OF ALL RESPONSES TO PHQ QUESTIONS 1-9: 0

## 2025-08-25 ENCOUNTER — OFFICE VISIT (OUTPATIENT)
Dept: FAMILY MEDICINE CLINIC | Age: 80
End: 2025-08-25

## 2025-08-25 VITALS
SYSTOLIC BLOOD PRESSURE: 132 MMHG | BODY MASS INDEX: 20.96 KG/M2 | HEIGHT: 64 IN | DIASTOLIC BLOOD PRESSURE: 62 MMHG | WEIGHT: 122.8 LBS | RESPIRATION RATE: 16 BRPM | TEMPERATURE: 97.8 F | HEART RATE: 85 BPM

## 2025-08-25 DIAGNOSIS — R82.998 URINE LEUKOCYTES: ICD-10-CM

## 2025-08-25 DIAGNOSIS — N30.00 ACUTE CYSTITIS WITHOUT HEMATURIA: Primary | ICD-10-CM

## 2025-08-25 LAB
BILIRUBIN, URINE: NEGATIVE
BLOOD URINE, POC: NEGATIVE
CHARACTER, URINE: CLEAR
COLOR, UA: YELLOW
GLUCOSE URINE: NEGATIVE MG/DL
KETONES, URINE: NEGATIVE
LEUKOCYTE CLUMPS, URINE: ABNORMAL
NITRITE, URINE: NEGATIVE
PH, URINE: 5.5 (ref 5–9)
PROTEIN, URINE: NEGATIVE MG/DL
SPECIFIC GRAVITY UA: 1.02 (ref 1–1.03)
UROBILINOGEN, URINE: 0.2 EU/DL (ref 0–1)

## 2025-08-25 RX ORDER — NITROFURANTOIN 25; 75 MG/1; MG/1
100 CAPSULE ORAL 2 TIMES DAILY
Qty: 10 CAPSULE | Refills: 0 | Status: SHIPPED | OUTPATIENT
Start: 2025-08-25 | End: 2025-08-30

## 2025-08-25 ASSESSMENT — ENCOUNTER SYMPTOMS
NAUSEA: 0
CONSTIPATION: 0
VOMITING: 0
ABDOMINAL PAIN: 0
DIARRHEA: 0
BACK PAIN: 0

## 2025-08-27 ENCOUNTER — TELEPHONE (OUTPATIENT)
Dept: FAMILY MEDICINE CLINIC | Age: 80
End: 2025-08-27

## 2025-08-27 LAB
BACTERIA UR CULT: ABNORMAL
ORGANISM: ABNORMAL

## 2025-09-02 ENCOUNTER — OFFICE VISIT (OUTPATIENT)
Dept: FAMILY MEDICINE CLINIC | Age: 80
End: 2025-09-02

## 2025-09-02 VITALS
WEIGHT: 122 LBS | TEMPERATURE: 98.1 F | BODY MASS INDEX: 20.94 KG/M2 | DIASTOLIC BLOOD PRESSURE: 72 MMHG | RESPIRATION RATE: 16 BRPM | HEART RATE: 80 BPM | SYSTOLIC BLOOD PRESSURE: 114 MMHG

## 2025-09-02 DIAGNOSIS — N81.4 UTERINE PROLAPSE: Primary | ICD-10-CM

## 2025-09-03 ASSESSMENT — ENCOUNTER SYMPTOMS
BLOOD IN STOOL: 0
SHORTNESS OF BREATH: 0
VOMITING: 0
CONSTIPATION: 0
NAUSEA: 0
DIARRHEA: 0